# Patient Record
Sex: MALE | Race: WHITE | NOT HISPANIC OR LATINO | Employment: FULL TIME | URBAN - METROPOLITAN AREA
[De-identification: names, ages, dates, MRNs, and addresses within clinical notes are randomized per-mention and may not be internally consistent; named-entity substitution may affect disease eponyms.]

---

## 2020-12-04 ENCOUNTER — OFFICE VISIT (OUTPATIENT)
Dept: URGENT CARE | Facility: CLINIC | Age: 30
End: 2020-12-04
Payer: COMMERCIAL

## 2020-12-04 VITALS
TEMPERATURE: 98 F | OXYGEN SATURATION: 97 % | BODY MASS INDEX: 35.27 KG/M2 | WEIGHT: 238.1 LBS | DIASTOLIC BLOOD PRESSURE: 78 MMHG | HEART RATE: 86 BPM | RESPIRATION RATE: 16 BRPM | HEIGHT: 69 IN | SYSTOLIC BLOOD PRESSURE: 142 MMHG

## 2020-12-04 DIAGNOSIS — S96.912A STRAIN OF LEFT ANKLE, INITIAL ENCOUNTER: ICD-10-CM

## 2020-12-04 PROCEDURE — 99213 OFFICE O/P EST LOW 20 MIN: CPT | Performed by: PHYSICIAN ASSISTANT

## 2020-12-04 RX ORDER — MELOXICAM 7.5 MG/1
7.5 TABLET ORAL DAILY
Qty: 30 TAB | Refills: 0 | Status: SHIPPED
Start: 2020-12-04 | End: 2020-12-04 | Stop reason: CLARIF

## 2020-12-04 RX ORDER — FLUTICASONE PROPIONATE AND SALMETEROL XINAFOATE 230; 21 UG/1; UG/1
AEROSOL, METERED RESPIRATORY (INHALATION)
COMMUNITY
Start: 2020-06-15 | End: 2021-03-16 | Stop reason: SDUPTHER

## 2020-12-04 ASSESSMENT — ENCOUNTER SYMPTOMS
SHORTNESS OF BREATH: 0
FEVER: 0
FOCAL WEAKNESS: 0
TINGLING: 0
CHILLS: 0
NAUSEA: 0
VOMITING: 0
SENSORY CHANGE: 0

## 2020-12-04 NOTE — PROGRESS NOTES
"Subjective:   Ghassan Araujo  is a 30 y.o. male who presents for Ankle Injury (Pt states he may have rolled his LT ankle, now feeling pain that radiates to the outter portion of LT leg.)      Ankle Injury   Pertinent negatives include no tingling.   Patient reports having rolled his ankle twice over the last 2 days.  He notes initially while doing a [place] he felt some pain to the front of left ankle worsened yesterday after forced dorsiflexion while stepping up onto a step.  Patient denies past medical history of surgery or significant injury to left ankle.  Denies numbness tingling or weakness.  Notes mild localized swelling and tenderness to palpation as well as pain with weightbearing to anterior lateral left ankle.  Denies crepitus.  Denies treatments tried.  Patient does avoid anti-inflammatories secondary to past medical history of Crohn's.  Comes to clinic requesting work note.  Patient informed we do not have x-ray prior to checking in.    Review of Systems   Constitutional: Negative for chills and fever.   Respiratory: Negative for shortness of breath.    Gastrointestinal: Negative for nausea and vomiting.   Musculoskeletal: Positive for joint pain ( left ankle).   Skin: Negative for rash.   Neurological: Negative for tingling, sensory change and focal weakness.       Allergies   Allergen Reactions   • Penicillins Anaphylaxis   • Methotrexate Rash        Objective:   /78   Pulse 86   Temp 36.7 °C (98 °F) (Temporal)   Resp 16   Ht 1.753 m (5' 9\")   Wt 108 kg (238 lb 1.6 oz)   SpO2 97%   BMI 35.16 kg/m²     Physical Exam  Vitals signs and nursing note reviewed.   Constitutional:       General: He is not in acute distress.     Appearance: He is well-developed. He is not diaphoretic.   HENT:      Head: Normocephalic and atraumatic.      Right Ear: External ear normal.      Left Ear: External ear normal.      Nose: Nose normal.   Eyes:      General: No scleral icterus.        Right eye: " No discharge.         Left eye: No discharge.      Conjunctiva/sclera: Conjunctivae normal.   Neck:      Musculoskeletal: Neck supple.   Pulmonary:      Effort: Pulmonary effort is normal. No respiratory distress.   Musculoskeletal: Normal range of motion.      Left ankle: He exhibits swelling. He exhibits normal range of motion, no ecchymosis, no deformity, no laceration and normal pulse. Tenderness. Lateral malleolus and AITFL tenderness found. No medial malleolus, no CF ligament, no posterior TFL, no head of 5th metatarsal and no proximal fibula tenderness found. Achilles tendon normal.   Skin:     General: Skin is warm and dry.      Coloration: Skin is not pale.   Neurological:      Mental Status: He is alert and oriented to person, place, and time.      Coordination: Coordination normal.         Assessment/Plan:   1. Strain of left ankle, initial encounter  - REFERRAL TO SPORTS MEDICINE    Other orders  - PROBIOTIC PRODUCT PO; Take  by mouth every day.  - fluticasone-salmeterol (ADVAIR HFA) 230-21 MCG/ACT inhaler; INHALE 2 PUFFS BY MOUTH TWICE A DAY  - ALBUTEROL INH; Inhale.  Recommend conservative care, rest, ice, elevation, work on gentle ROM exercises  Sent with [employer] and [employer], referral for sports med follow-up with persistence, recommendation for OTC Tylenol, ice, elevation, active range of motion    I have worn an N95 mask, gloves and eye protection for the entire encounter with this patient.     Differential diagnosis, natural history, supportive care, and indications for immediate follow-up discussed.

## 2020-12-04 NOTE — LETTER
December 4, 2020       Patient: Ghassan Araujo   YOB: 1990   Date of Visit: 12/4/2020         To Whom It May Concern:    In my medical opinion, I recommend that Ghassan Araujo should be excused from work for today, tomorrow and permitted to return to next scheduled shift thereafter.     This is non-Covid related.    If you have any questions or concerns, please don't hesitate to call [phone]          Sincerely,          Immanuel Alicia P.A.-C.  Electronically Signed

## 2020-12-07 ENCOUNTER — SUPERVISING PHYSICIAN REVIEW (OUTPATIENT)
Dept: URGENT CARE | Facility: CLINIC | Age: 30
End: 2020-12-07

## 2020-12-08 NOTE — PROGRESS NOTES
I have reviewed and agree with history, assessment and plan for office encounter on 12/4/2020 with Advanced Practice Provider: Gigi LAY.  Face to face encounter/direct observation: No  Suggested changes to plan or follow-up: none   Myke Ramos M.D.

## 2021-01-21 ENCOUNTER — TELEPHONE (OUTPATIENT)
Dept: SCHEDULING | Facility: IMAGING CENTER | Age: 31
End: 2021-01-21

## 2021-03-16 ENCOUNTER — TELEMEDICINE (OUTPATIENT)
Dept: MEDICAL GROUP | Facility: PHYSICIAN GROUP | Age: 31
End: 2021-03-16
Payer: COMMERCIAL

## 2021-03-16 ENCOUNTER — APPOINTMENT (OUTPATIENT)
Dept: MEDICAL GROUP | Facility: PHYSICIAN GROUP | Age: 31
End: 2021-03-16
Payer: COMMERCIAL

## 2021-03-16 VITALS — WEIGHT: 230 LBS | BODY MASS INDEX: 32.93 KG/M2 | HEIGHT: 70 IN

## 2021-03-16 DIAGNOSIS — Z13.29 SCREENING FOR ENDOCRINE DISORDER: ICD-10-CM

## 2021-03-16 DIAGNOSIS — F98.8 ATTENTION DEFICIT DISORDER, UNSPECIFIED HYPERACTIVITY PRESENCE: ICD-10-CM

## 2021-03-16 DIAGNOSIS — Z13.0 SCREENING FOR DEFICIENCY ANEMIA: ICD-10-CM

## 2021-03-16 DIAGNOSIS — J45.20 MILD INTERMITTENT ASTHMA WITHOUT COMPLICATION: ICD-10-CM

## 2021-03-16 PROCEDURE — 99204 OFFICE O/P NEW MOD 45 MIN: CPT | Performed by: INTERNAL MEDICINE

## 2021-03-16 RX ORDER — FLUTICASONE PROPIONATE AND SALMETEROL XINAFOATE 230; 21 UG/1; UG/1
2 AEROSOL, METERED RESPIRATORY (INHALATION) DAILY
Qty: 1 EACH | Refills: 6 | Status: SHIPPED | OUTPATIENT
Start: 2021-03-16 | End: 2021-05-24 | Stop reason: SDUPTHER

## 2021-03-16 RX ORDER — DEXTROAMPHETAMINE SACCHARATE, AMPHETAMINE ASPARTATE MONOHYDRATE, DEXTROAMPHETAMINE SULFATE AND AMPHETAMINE SULFATE 5; 5; 5; 5 MG/1; MG/1; MG/1; MG/1
20 CAPSULE, EXTENDED RELEASE ORAL EVERY MORNING
Qty: 30 CAPSULE | Refills: 0 | Status: SHIPPED | OUTPATIENT
Start: 2021-03-16 | End: 2021-04-22 | Stop reason: SDUPTHER

## 2021-03-16 RX ORDER — ALBUTEROL SULFATE 90 UG/1
2 AEROSOL, METERED RESPIRATORY (INHALATION) EVERY 4 HOURS PRN
Qty: 1 EACH | Refills: 5 | Status: SHIPPED | OUTPATIENT
Start: 2021-03-16 | End: 2021-11-23 | Stop reason: SDUPTHER

## 2021-03-16 ASSESSMENT — PATIENT HEALTH QUESTIONNAIRE - PHQ9: CLINICAL INTERPRETATION OF PHQ2 SCORE: 0

## 2021-03-16 NOTE — PROGRESS NOTES
This visit was conducted via  [employer] Video Virtual Visit using secure and encrypted videoconferencing technology.   The patient's identity was confirmed and verbal consent was obtained for this virtual visit.  -------------------------------------------------------------------------------    CC: New provider  Refill Adderall Advair and albuterol         HPI: This is a 30 y.o. pt.  Pt's medical history is notable for:      ADD (attention deficit disorder)  This is a chronic and stable condition.  The patient has been taking Adderall extended release daily.  Patient is requesting refills.  No significant side effects reported    Mild intermittent asthma  Chronic and stable condition.  The patient currently using Advair daily and albuterol as needed.  He also requests refills.  Patient denies fever chills shortness of breath or wheezing at the present time.              REVIEW OF SYSTEMS:     Constitutional:  no fever / chills   Neurologic: no headache  ENT: no sore throat, no hearing loss  CV:  no chest pain, no palpitations  Pulmonary: no SOB, no cough          Allergies: Penicillins and Methotrexate    Current Outpatient Medications Ordered in Epic   Medication Sig Dispense Refill   • amphetamine-dextroamphetamine (ADDERALL XR, 20MG,) 20 MG per XR capsule Take 1 capsule by mouth every morning for 30 days. 30 capsule 0   • fluticasone-salmeterol (ADVAIR HFA) 230-21 MCG/ACT inhaler Inhale 2 Puffs every day. 1 Each 6   • albuterol 108 (90 Base) MCG/ACT Aero Soln inhalation aerosol Inhale 2 Puffs every four hours as needed for Shortness of Breath. 1 Each 5   • PROBIOTIC PRODUCT PO Take  by mouth every day.     • ALBUTEROL INH Inhale.       No current [facility]-ordered facility-administered medications on file.       Past Medical, Social, and Family history reviewed and updated in EPIC    ------------------------------------------------------------------      PHYSICAL EXAM:   Psych:  A&O x 3, mood and affect  appropriate  Constitutional: no distress  Skin: No apparent rashes  ENMT: Lips without lesions. Phonation normal.  Neck: No obvious masses visible   Respiratory: Unlabored respiratory effort      ---------------------------------------------------------------------    ASSESSMENT:   1. Mild intermittent asthma without complication     2. Attention deficit disorder, unspecified hyperactivity presence  amphetamine-dextroamphetamine (ADDERALL XR, 20MG,) 20 MG per XR capsule   3. Screening for endocrine disorder  HEMOGLOBIN A1C    ALANINE AMINO-TRANS    Basic Metabolic Panel    Lipid Profile    TSH    MICROALBUMIN CREAT RATIO URINE    VITAMIN D,25 HYDROXY   4. Screening for deficiency anemia  CBC WITH DIFFERENTIAL          MEDICAL DECISION MAKING: DISCUSSION / STATUS / PLAN:    Patient appears clinically stable  Refilled Advair albuterol and Adderall per patient request  Potential side effects of each medication discussed with the patient  General lab tests ordered today.  Advised the patient to call for the results after few days.        Return in about 6 months (around 9/16/2021).       PATIENT EDUCATION:  -If any problems should arise, patient was advised to contact our office or go to ER to be evaluated.      Please note that this dictation was created using voice recognition software. I have made every reasonable attempt to correct obvious errors, but it is possible there are errors of grammar and possibly content that I did not discover before finalizing the note.

## 2021-03-16 NOTE — ASSESSMENT & PLAN NOTE
This is a chronic and stable condition.  The patient has been taking Adderall extended release daily.  Patient is requesting refills.  No significant side effects reported

## 2021-03-16 NOTE — ASSESSMENT & PLAN NOTE
Chronic and stable condition.  The patient currently using Advair daily and albuterol as needed.  He also requests refills.  Patient denies fever chills shortness of breath or wheezing at the present time.

## 2021-04-16 ENCOUNTER — OFFICE VISIT (OUTPATIENT)
Dept: URGENT CARE | Facility: CLINIC | Age: 31
End: 2021-04-16
Payer: COMMERCIAL

## 2021-04-16 VITALS
HEIGHT: 70 IN | SYSTOLIC BLOOD PRESSURE: 112 MMHG | RESPIRATION RATE: 16 BRPM | OXYGEN SATURATION: 95 % | BODY MASS INDEX: 31.64 KG/M2 | DIASTOLIC BLOOD PRESSURE: 82 MMHG | TEMPERATURE: 98 F | HEART RATE: 76 BPM | WEIGHT: 221 LBS

## 2021-04-16 DIAGNOSIS — K52.9 GASTROENTERITIS: ICD-10-CM

## 2021-04-16 PROCEDURE — 99214 OFFICE O/P EST MOD 30 MIN: CPT | Performed by: PHYSICIAN ASSISTANT

## 2021-04-16 RX ORDER — ONDANSETRON 2 MG/ML
4 INJECTION INTRAMUSCULAR; INTRAVENOUS ONCE
Status: DISCONTINUED | OUTPATIENT
Start: 2021-04-16 | End: 2021-04-16

## 2021-04-16 RX ORDER — ONDANSETRON 4 MG/1
4 TABLET, ORALLY DISINTEGRATING ORAL EVERY 6 HOURS PRN
Qty: 30 TABLET | Refills: 0 | Status: SHIPPED
Start: 2021-04-16 | End: 2021-08-23

## 2021-04-16 RX ORDER — ONDANSETRON 2 MG/ML
4 INJECTION INTRAMUSCULAR; INTRAVENOUS ONCE
Status: COMPLETED | OUTPATIENT
Start: 2021-04-16 | End: 2021-04-16

## 2021-04-16 RX ORDER — ONDANSETRON 4 MG/1
4 TABLET, ORALLY DISINTEGRATING ORAL ONCE
Status: COMPLETED | OUTPATIENT
Start: 2021-04-16 | End: 2021-04-16

## 2021-04-16 RX ADMIN — ONDANSETRON 4 MG: 2 INJECTION INTRAMUSCULAR; INTRAVENOUS at 09:50

## 2021-04-16 RX ADMIN — ONDANSETRON 4 MG: 4 TABLET, ORALLY DISINTEGRATING ORAL at 09:30

## 2021-04-16 ASSESSMENT — ENCOUNTER SYMPTOMS
DIZZINESS: 0
ABDOMINAL PAIN: 0
NUMBER OF EPISODES OF EMESIS TODAY: 1
BLOOD IN STOOL: 0
CONSTIPATION: 0
MUSCULOSKELETAL NEGATIVE: 1
DIARRHEA: 0
NAUSEA: 1
CHILLS: 0
HEADACHES: 0
SHORTNESS OF BREATH: 0
FEVER: 0
VOMITING: 1
CHANGE IN BOWEL HABIT: 0

## 2021-04-16 NOTE — PROGRESS NOTES
"Subjective:      Ghassan Araujo is a 30 y.o. male who presents with Emesis (Nausea/Vomiting, Pt states Sx started within the last few hours. )            Emesis  This is a new problem. The current episode started today. The problem occurs constantly. The problem has been unchanged. Associated symptoms include nausea and vomiting. Pertinent negatives include no abdominal pain, change in bowel habit, chest pain, chills, congestion, fever, headaches or rash. The symptoms are aggravated by drinking and eating. He has tried nothing for the symptoms.       Patient presents to urgent care reporting nausea and vomiting starting approximately 2 hours PTA. No fevers, chills, body aches, abdominal pain, diarrhea, urinary symptoms, or bloody stools. No recent travel, suspect food intake, recent use of antibiotics, or sick contacts. He has no known medical problems and doesn't take any regular medications.     Review of Systems   Constitutional: Negative for chills and fever.   HENT: Negative for congestion.    Respiratory: Negative for shortness of breath.    Cardiovascular: Negative for chest pain.   Gastrointestinal: Positive for nausea and vomiting. Negative for abdominal pain, blood in stool, change in bowel habit, constipation and diarrhea.   Genitourinary: Negative.    Musculoskeletal: Negative.    Skin: Negative for rash.   Neurological: Negative for dizziness and headaches.        Objective:     /82   Pulse 76   Temp 36.7 °C (98 °F) (Temporal)   Resp 16   Ht 1.778 m (5' 10\")   Wt 100 kg (221 lb)   SpO2 95%   BMI 31.71 kg/m²        Physical Exam  Vitals and nursing note reviewed.   Constitutional:       Appearance: Normal appearance. He is well-developed.   HENT:      Head: Normocephalic and atraumatic.      Right Ear: External ear normal.      Left Ear: External ear normal.   Eyes:      Conjunctiva/sclera: Conjunctivae normal.   Cardiovascular:      Rate and Rhythm: Normal rate.   Pulmonary:     "  Effort: Pulmonary effort is normal.   Abdominal:      General: Abdomen is flat. Bowel sounds are normal. There is no distension.      Tenderness: There is no abdominal tenderness. There is no right CVA tenderness, left CVA tenderness or guarding.   Musculoskeletal:         General: Normal range of motion.      Cervical back: Normal range of motion.   Skin:     General: Skin is warm and dry.   Neurological:      Mental Status: He is alert and oriented to person, place, and time.   Psychiatric:         Behavior: Behavior normal.          PMH:  has no past medical history on file.  MEDS:   Current Outpatient Medications:   •  ondansetron (ZOFRAN ODT) 4 MG TABLET DISPERSIBLE, Take 1 tablet by mouth every 6 hours as needed for Nausea., Disp: 30 tablet, Rfl: 0  •  fluticasone-salmeterol (ADVAIR HFA) 230-21 MCG/ACT inhaler, Inhale 2 Puffs every day., Disp: 1 Each, Rfl: 6  •  albuterol 108 (90 Base) MCG/ACT Aero Soln inhalation aerosol, Inhale 2 Puffs every four hours as needed for Shortness of Breath. (Patient not taking: Reported on 4/16/2021), Disp: 1 Each, Rfl: 5  •  PROBIOTIC PRODUCT PO, Take  by mouth every day., Disp: , Rfl:   •  ALBUTEROL INH, Inhale., Disp: , Rfl:     Current Facility-Administered Medications:   •  ondansetron (ZOFRAN ODT) dispertab 4 mg, 4 mg, Oral, Once, Clau Dow, P.A.-C.  •  ondansetron (ZOFRAN) syringe/vial injection 4 mg, 4 mg, Intramuscular, Once, Clau Dow, P.A.-C.  ALLERGIES:   Allergies   Allergen Reactions   • Penicillins Anaphylaxis   • Methotrexate Rash     SURGHX:   Past Surgical History:   Procedure Laterality Date   • HAND SURGERY Right 2003     SOCHX:  reports that he has been smoking. He has never used smokeless tobacco. He reports current alcohol use. He reports current drug use. Drug: Marijuana.  FH: family history includes Cancer in his maternal grandmother and paternal grandfather; Heart Disease in his maternal grandfather and paternal grandmother.        Assessment/Plan:        1. Gastroenteritis    - ondansetron (ZOFRAN ODT) dispertab 4 mg   - patient vomited shortly after taking medication  - ondansetron (ZOFRAN) syringe/vial injection 4 mg   - Given in urgent care with moderate relief of nausea after 15 minutes of monitoring in clinic   - ondansetron (ZOFRAN ODT) 4 MG TABLET DISPERSIBLE; Take 1 tablet by mouth every 6 hours as needed for Nausea.  Dispense: 30 tablet; Refill: 0      Advised patient symptoms are most likely viral in etiology, recommend supportive care. Increased fluids and rest. Zofran as needed for nausea/vomiting. BRAT diet discussed. Monitor closely and seek medical attention if symptoms persist/worsen. Work note provided today. The patient demonstrated a good understanding and agreed with the treatment plan.

## 2021-04-16 NOTE — LETTER
April 16, 2021         Patient: Ghassan Araujo   YOB: 1990   Date of Visit: 4/16/2021           To Whom it May Concern:    Ghassan Araujo was seen in my clinic on 4/16/2021.  Please excuse his absence from 4/16/21-4/17/21.     If you have any questions or concerns, please don't hesitate to call.        Sincerely,           Clau Dow P.A.-C.  Electronically Signed

## 2021-04-22 ENCOUNTER — TELEMEDICINE (OUTPATIENT)
Dept: MEDICAL GROUP | Facility: PHYSICIAN GROUP | Age: 31
End: 2021-04-22
Payer: COMMERCIAL

## 2021-04-22 VITALS — RESPIRATION RATE: 16 BRPM | BODY MASS INDEX: 31.64 KG/M2 | WEIGHT: 221 LBS | HEIGHT: 70 IN

## 2021-04-22 DIAGNOSIS — F98.8 ATTENTION DEFICIT DISORDER, UNSPECIFIED HYPERACTIVITY PRESENCE: ICD-10-CM

## 2021-04-22 DIAGNOSIS — J45.20 MILD INTERMITTENT ASTHMA WITHOUT COMPLICATION: ICD-10-CM

## 2021-04-22 DIAGNOSIS — K50.90 CROHN'S DISEASE WITHOUT COMPLICATION, UNSPECIFIED GASTROINTESTINAL TRACT LOCATION (HCC): ICD-10-CM

## 2021-04-22 PROCEDURE — 99214 OFFICE O/P EST MOD 30 MIN: CPT | Mod: 95,CR | Performed by: INTERNAL MEDICINE

## 2021-04-22 RX ORDER — DEXTROAMPHETAMINE SACCHARATE, AMPHETAMINE ASPARTATE MONOHYDRATE, DEXTROAMPHETAMINE SULFATE AND AMPHETAMINE SULFATE 5; 5; 5; 5 MG/1; MG/1; MG/1; MG/1
20 CAPSULE, EXTENDED RELEASE ORAL EVERY MORNING
Qty: 30 CAPSULE | Refills: 0 | Status: SHIPPED | OUTPATIENT
Start: 2021-04-22 | End: 2021-05-24 | Stop reason: SDUPTHER

## 2021-04-22 NOTE — ASSESSMENT & PLAN NOTE
Per patient report patient was diagnosed with Crohn's disease previously.  Patient is currently not on any therapy.  The patient requests a referral to see GI specialist for follow-up

## 2021-04-22 NOTE — ASSESSMENT & PLAN NOTE
This is a chronic and stable condition.  The patient presently taking Adderall.  He is requesting refills.  No significant side effects reported.

## 2021-04-22 NOTE — ASSESSMENT & PLAN NOTE
Chronic stable condition.  The patient uses Advair daily and albuterol as needed.  No new concerns noted at this time.

## 2021-04-22 NOTE — PROGRESS NOTES
This visit was conducted via  [email or web address] Video Virtual Visit using secure and encrypted videoconferencing technology.   The patient's identity was confirmed and verbal consent was obtained for this virtual visit.  -------------------------------------------------------------------------------    CC: Referral to GI  Refill of Adderall         HPI: This is a 30 y.o. pt.  Pt's medical history is notable for:      ADD (attention deficit disorder)  This is a chronic and stable condition.  The patient presently taking Adderall.  He is requesting refills.  No significant side effects reported.    Mild intermittent asthma  Chronic stable condition.  The patient uses Advair daily and albuterol as needed.  No new concerns noted at this time.    Crohn disease (HCC)  Per patient report patient was diagnosed with Crohn's disease previously.  Patient is currently not on any therapy.  The patient requests a referral to see GI specialist for follow-up              REVIEW OF SYSTEMS:     Constitutional:  no fever / chills   Neurologic: no headache  ENT: no sore throat, no hearing loss  CV:  no chest pain, no palpitations  Pulmonary: no SOB, no cough          Allergies: Penicillins and Methotrexate    Current Outpatient Medications Ordered in Epic   Medication Sig Dispense Refill   • amphetamine-dextroamphetamine (ADDERALL XR, 20MG,) 20 MG per XR capsule Take 1 capsule by mouth every morning for 30 days. 30 capsule 0   • ondansetron (ZOFRAN ODT) 4 MG TABLET DISPERSIBLE Take 1 tablet by mouth every 6 hours as needed for Nausea. 30 tablet 0   • fluticasone-salmeterol (ADVAIR HFA) 230-21 MCG/ACT inhaler Inhale 2 Puffs every day. 1 Each 6   • albuterol 108 (90 Base) MCG/ACT Aero Soln inhalation aerosol Inhale 2 Puffs every four hours as needed for Shortness of Breath. 1 Each 5   • PROBIOTIC PRODUCT PO Take  by mouth every day.     • ALBUTEROL INH Inhale.       No current [facility]-ordered facility-administered medications on file.       Past  Medical, Social, and Family history reviewed and updated in EPIC    ------------------------------------------------------------------    Vitals:    04/22/21 0922   Resp: 16       PHYSICAL EXAM:   Psych:  A&O x 3, mood and affect appropriate  Constitutional: no distress  Skin: No apparent rashes  ENMT: Lips without lesions. Phonation normal.  Neck: No obvious masses visible   Respiratory: Unlabored respiratory effort      ---------------------------------------------------------------------    ASSESSMENT:   1. Attention deficit disorder, unspecified hyperactivity presence  amphetamine-dextroamphetamine (ADDERALL XR, 20MG,) 20 MG per XR capsule   2. Mild intermittent asthma without complication     3. Crohn's disease without complication, unspecified gastrointestinal tract location (HCC)  REFERRAL TO GASTROENTEROLOGY          MEDICAL DECISION MAKING: DISCUSSION / STATUS / PLAN:    Adderall refilled.  No significant side effects reported.  Patient stated that the medication has helped with the ADHD condition significantly.    Asthma.  Chronic condition stable currently patient asymptomatic.    History of Crohn's disease.  Refer the patient to GI specialist.       Return in about 3 months (around 7/22/2021).       PATIENT EDUCATION:  -If any problems should arise, patient was advised to contact our office or go to ER to be evaluated.      Please note that this dictation was created using voice recognition software. I have made every reasonable attempt to correct obvious errors, but it is possible there are errors of grammar and possibly content that I did not discover before finalizing the note.

## 2021-05-24 DIAGNOSIS — F98.8 ATTENTION DEFICIT DISORDER, UNSPECIFIED HYPERACTIVITY PRESENCE: ICD-10-CM

## 2021-05-24 RX ORDER — FLUTICASONE PROPIONATE AND SALMETEROL XINAFOATE 230; 21 UG/1; UG/1
2 AEROSOL, METERED RESPIRATORY (INHALATION) DAILY
Qty: 1 EACH | Refills: 6 | Status: SHIPPED | OUTPATIENT
Start: 2021-05-24 | End: 2021-08-09 | Stop reason: SDUPTHER

## 2021-05-24 RX ORDER — DEXTROAMPHETAMINE SACCHARATE, AMPHETAMINE ASPARTATE MONOHYDRATE, DEXTROAMPHETAMINE SULFATE AND AMPHETAMINE SULFATE 5; 5; 5; 5 MG/1; MG/1; MG/1; MG/1
20 CAPSULE, EXTENDED RELEASE ORAL EVERY MORNING
Qty: 30 CAPSULE | Refills: 0 | Status: SHIPPED | OUTPATIENT
Start: 2021-05-24 | End: 2021-06-28 | Stop reason: SDUPTHER

## 2021-06-28 DIAGNOSIS — F98.8 ATTENTION DEFICIT DISORDER, UNSPECIFIED HYPERACTIVITY PRESENCE: ICD-10-CM

## 2021-06-28 RX ORDER — DEXTROAMPHETAMINE SACCHARATE, AMPHETAMINE ASPARTATE MONOHYDRATE, DEXTROAMPHETAMINE SULFATE AND AMPHETAMINE SULFATE 5; 5; 5; 5 MG/1; MG/1; MG/1; MG/1
20 CAPSULE, EXTENDED RELEASE ORAL EVERY MORNING
Qty: 30 CAPSULE | Refills: 0 | Status: SHIPPED | OUTPATIENT
Start: 2021-06-28 | End: 2021-08-09 | Stop reason: SDUPTHER

## 2021-08-09 DIAGNOSIS — F98.8 ATTENTION DEFICIT DISORDER, UNSPECIFIED HYPERACTIVITY PRESENCE: ICD-10-CM

## 2021-08-09 RX ORDER — FLUTICASONE PROPIONATE AND SALMETEROL XINAFOATE 230; 21 UG/1; UG/1
2 AEROSOL, METERED RESPIRATORY (INHALATION) DAILY
Qty: 1 EACH | Refills: 6 | Status: SHIPPED | OUTPATIENT
Start: 2021-08-09 | End: 2021-12-03 | Stop reason: SDUPTHER

## 2021-08-09 RX ORDER — DEXTROAMPHETAMINE SACCHARATE, AMPHETAMINE ASPARTATE MONOHYDRATE, DEXTROAMPHETAMINE SULFATE AND AMPHETAMINE SULFATE 5; 5; 5; 5 MG/1; MG/1; MG/1; MG/1
20 CAPSULE, EXTENDED RELEASE ORAL EVERY MORNING
Qty: 30 CAPSULE | Refills: 0 | Status: SHIPPED | OUTPATIENT
Start: 2021-08-09 | End: 2021-09-13 | Stop reason: SDUPTHER

## 2021-08-23 ENCOUNTER — OCCUPATIONAL MEDICINE (OUTPATIENT)
Dept: URGENT CARE | Facility: CLINIC | Age: 31
End: 2021-08-23
Payer: COMMERCIAL

## 2021-08-23 ENCOUNTER — APPOINTMENT (OUTPATIENT)
Dept: RADIOLOGY | Facility: IMAGING CENTER | Age: 31
End: 2021-08-23
Attending: FAMILY MEDICINE
Payer: COMMERCIAL

## 2021-08-23 VITALS
RESPIRATION RATE: 18 BRPM | SYSTOLIC BLOOD PRESSURE: 114 MMHG | OXYGEN SATURATION: 98 % | WEIGHT: 220 LBS | HEART RATE: 88 BPM | BODY MASS INDEX: 31.5 KG/M2 | TEMPERATURE: 98.1 F | DIASTOLIC BLOOD PRESSURE: 70 MMHG | HEIGHT: 70 IN

## 2021-08-23 DIAGNOSIS — S87.01XA CRUSHING INJURY OF RIGHT KNEE, INITIAL ENCOUNTER: ICD-10-CM

## 2021-08-23 PROBLEM — J45.909 ASTHMA: Status: ACTIVE | Noted: 2018-02-27

## 2021-08-23 PROBLEM — K51.90 ULCERATIVE COLITIS WITHOUT COMPLICATIONS (HCC): Status: ACTIVE | Noted: 2018-02-27

## 2021-08-23 PROBLEM — I47.10 PSVT (PAROXYSMAL SUPRAVENTRICULAR TACHYCARDIA) (HCC): Status: ACTIVE | Noted: 2018-02-27

## 2021-08-23 PROBLEM — F90.9 ADHD: Status: ACTIVE | Noted: 2018-02-27

## 2021-08-23 PROCEDURE — 73564 X-RAY EXAM KNEE 4 OR MORE: CPT | Mod: TC,RT | Performed by: FAMILY MEDICINE

## 2021-08-23 PROCEDURE — 99203 OFFICE O/P NEW LOW 30 MIN: CPT | Performed by: FAMILY MEDICINE

## 2021-08-23 ASSESSMENT — ENCOUNTER SYMPTOMS
FOCAL WEAKNESS: 0
TINGLING: 0
SENSORY CHANGE: 0

## 2021-08-23 NOTE — LETTER
"EMPLOYEE’S CLAIM FOR COMPENSATION/ REPORT OF INITIAL TREATMENT  FORM C-4    EMPLOYEE’S CLAIM - PROVIDE ALL INFORMATION REQUESTED   First Name  Ghassan Last Name  Van Birthdate                    1990                Sex  male Claim Number (Insurer’s Use Only)    Home Address  [address] Age  30 y.o. 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 (if applicable)  Changing a weld gun arm    How did this injury or occupational disease occur? (Be specific an answer in detail. Use additional sheet if necessary)  Slipped off  cable tray(about 8\" tall) and knee'd a robot   If you believe that you have an occupational disease, when did you first have knowledge of the disability and it relationship to your employment?  na Witnesses to the Accident  MARIA INES ZARAGOZA      Nature of Injury or Occupational Disease  Workers' Compensation  Part(s) of Body Injured or Affected  Knee (R), N/A, N/A    I certify that the above is true and correct to the best of my knowledge and that I have provided this " information in order to obtain the benefits of [place]).  I hereby authorize any physician, chiropractor, surgeon, practitioner, or other person, any hospital, including [facility] or [facility], any medical service organization, any insurance company, or other institution or organization to release to each other, any medical or other information, including benefits paid or payable, pertinent to this injury or disease, except information relative to diagnosis, treatment and/or counseling for AIDS, psychological conditions, alcohol or controlled substances, for which I must give specific authorization.  A Photostat of this authorization shall be as valid as the original.     Date   Place Employee’s Original or  *Electronic Signature   THIS REPORT MUST BE COMPLETED AND MAILED WITHIN 3 WORKING DAYS OF TREATMENT   Place  [facility]  [place]  [facility] [place]   Date  8/23/2021 Diagnosis and Description of Injury or Occupational Disease  (S87.01XA) Crushing injury of right knee, initial encounter Is there evidence the injured employee was under the influence of alcohol and/or another controlled substance at the time of accident?  ? No ? Yes (if yes, please explain)    Hour  2:42 PM   The encounter diagnosis was Crushing injury of right knee, initial encounter. No   Treatment  Knee brace, ice  Have you advised the patient to remain off work five days or     more?    X-Ray Findings  Negative  Comments:X-ray right knee: No fracture   ? Yes Indicate dates:   From   To      From information given by the employee, together with medical evidence, can        you directly connect this injury or occupational disease as job incurred?  Yes ? No If no, is the injured employee capable of:  ? full duty  No ? modified duty  Yes   Is additional medical care by a physician indicated?  Yes If  "Modified Duty, Specify any Limitations / Restrictions  No squatting, no kneeling, no prolonged standing or walking   Do you know of any previous injury or disease contributing to this condition or occupational disease?  ? Yes ? No (Explain if yes)                          No   Date  8/23/2021 Print Health Care Provider's   Myke Ramos M.D. I certify the employer’s copy of  this form was mailed on:   Address  [address], [address] Insurer’s Use Only     [place] Zip  [address]    Provider’s Tax ID Number  [id number] Telephone  Dept: [phone]             Health Care Provider’s Original or Electronic Signature  e-MYKE Castro M.D. Degree (MD,DO, DC,PA-C,APRN)         * Complete and attach Release of Information (Form C-4A) when injured employee signs C-4 Form electronically  ORIGINAL - TREATING HEALTHCARE PROVIDER PAGE 2 - INSURER/TPA PAGE 3 - EMPLOYER PAGE 4 - EMPLOYEE             Form C-4 (rev.08/21)           BRIEF DESCRIPTION OF RIGHTS AND BENEFITS  (Pursuant to [place]RS 616C.050)    Notice of Injury or Occupational Disease (Incident Report Form C-1): If an injury or occupational disease (OD) arises out of and in the course of employment, you must provide written notice to your employer as soon as practicable, but no later than 7 days after the accident or OD. Your employer shall maintain a sufficient supply of the required forms.    Claim for Compensation (Form C-4): If medical treatment is sought, the form C-4 is available at the place of initial treatment. A completed \"Claim for Compensation\" (Form C-4) must be filed within 90 days after an accident or OD. The treating physician or chiropractor must, within 3 working days after treatment, complete and mail to the employer, the employer's insurer and third-party , the Claim for Compensation.    Medical Treatment: If you require medical treatment for your on-the-job injury or OD, you may be required to select a physician " or chiropractor from a list provided by your workers’ compensation insurer, if it has contracted with an Organization for Managed Care (MCO) or Preferred Provider Organization (PPO) or providers of health care. If your employer has not entered into a contract with an MCO or PPO, you may select a physician or chiropractor from the Panel of Physicians and Chiropractors. Any medical costs related to your industrial injury or OD will be paid by your insurer.    Temporary Total Disability (TTD): If your doctor has certified that you are unable to work for a period of at least 5 consecutive days, or 5 cumulative days in a 20-day period, or places restrictions on you that your employer does not accommodate, you may be entitled to TTD compensation.    Temporary Partial Disability (TPD): If the wage you receive upon reemployment is less than the compensation for TTD to which you are entitled, the insurer may be required to pay you TPD compensation to make up the difference. TPD can only be paid for a maximum of 24 months.    Permanent Partial Disability (PPD): When your medical condition is stable and there is an indication of a PPD as a result of your injury or OD, within 30 days, your insurer must arrange for an evaluation by a rating physician or chiropractor to determine the degree of your PPD. The amount of your PPD award depends on the date of injury, the results of the PPD evaluation, your age and wage.    Permanent Total Disability (PTD): If you are medically certified by a treating physician or chiropractor as permanently and totally disabled and have been granted a PTD status by your insurer, you are entitled to receive monthly benefits not to exceed 66 2/3% of your average monthly wage. The amount of your PTD payments is subject to reduction if you previously received a lump-sum PPD award.    Vocational Rehabilitation Services: You may be eligible for vocational rehabilitation services if you are unable to return  to the job due to a permanent physical impairment or permanent restrictions as a result of your injury or occupational disease.    Transportation and Per Doug Reimbursement: You may be eligible for travel expenses and per doug associated with medical treatment.    Reopening: You may be able to reopen your claim if your condition worsens after claim closure.     Appeal Process: If you disagree with a written determination issued by the insurer or the insurer does not respond to your request, you may appeal to the Department of Administration, , by following the instructions contained in your determination letter. You must appeal the determination within 70 days from the date of the determination letter at [address], [address], [place], [place] [address], or [address], [address], [place] [address]. If you disagree with the  decision, you may appeal to the Department of Administration, . You must file your appeal within 30 days from the date of the  decision letter at [address], [address], [place], [place] [address], or [address], [facility] [address], [place] [address]. If you disagree with a decision of an , you may file a petition for judicial review with the District Court. You must do so within 30 days of the Appeal Officer’s decision. You may be represented by an  at your own expense or you may contact the [facility] for possible representation.    [place]  for Injured Workers (NAIW): If you disagree with a  decision, you may request that NAIW represent you without charge at an  Hearing. For information regarding denial of benefits, you may contact the [facility] at: [address], [address], [place] [address], [phone], or [address], [address], [place] [address], [phone]    To File a Complaint with the Division: If you  wish to file a complaint with the  of the Division of Industrial Relations (DIR),  please contact the Workers’ Compensation Section, [address], [address], [place], [place] [address], telephone [phone], or [address], [address], [place], [place] [address], telephone [phone].    For assistance with Workers’ Compensation Issues: You may contact the [facility] Office for Consumer Health Assistance, [address], [address], [place], [place] [address], Toll Free 1-399.642.3865, Web site: http://Critical access hospital.nv.gov/Programs/YANI E-mail: [email or web address]              __________________________________________________________________                                    _________________            Employee Name / Signature                                                                                                                            Date                                                                                                                                                                                                                              D-2 (rev. 10/20)

## 2021-08-23 NOTE — PROGRESS NOTES
Subjective:   Ghassan Araujo is a 30 y.o. male who presents for Knee Pain (RT knee pain x 3 days , )    Date of injury 8/20/2021.  30-year-old  presents with chief complaint of right knee pain, sustained on 8/20/2021 when the employee slipped off a cable tray right knee struck a metal edge of a robot and experienced immediate right anterior knee pain.  The mechanism occurred at the end of the shift and the employee experienced pain with direct pressure, ambulation, and while driving home.  Employee applied ice and kept the right lower extremity elevated.  Employee continues to experience pain on the anterior aspect of the right knee which is worse with direct pressure, ambulation, and attempts at squatting.   See the C4 and D 39 form    Knee Pain      PMH:  has no past medical history on file.  MEDS:   Current Outpatient Medications:   •  amphetamine-dextroamphetamine (ADDERALL XR, 20MG,) 20 MG per XR capsule, Take 1 capsule by mouth every morning for 30 days., Disp: 30 capsule, Rfl: 0  •  fluticasone-salmeterol (ADVAIR HFA) 230-21 MCG/ACT inhaler, Inhale 2 Puffs every day., Disp: 1 Each, Rfl: 6  •  albuterol 108 (90 Base) MCG/ACT Aero Soln inhalation aerosol, Inhale 2 Puffs every four hours as needed for Shortness of Breath., Disp: 1 Each, Rfl: 5  •  PROBIOTIC PRODUCT PO, Take  by mouth every day., Disp: , Rfl:   ALLERGIES:   Allergies   Allergen Reactions   • Penicillins Anaphylaxis   • Methotrexate Rash     SURGHX:   Past Surgical History:   Procedure Laterality Date   • HAND SURGERY Right 2003     SOCHX:  reports that he has been smoking. He has never used smokeless tobacco. He reports current alcohol use. He reports current drug use. Drug: Marijuana.  FH:   Family History   Problem Relation Age of Onset   • Cancer Maternal Grandmother         uterine   • Heart Disease Maternal Grandfather    • Heart Disease Paternal Grandmother    • Cancer Paternal Grandfather          "Lymphoma     Review of Systems   Neurological: Negative for tingling, sensory change and focal weakness.        Objective:   /70   Pulse 88   Temp 36.7 °C (98.1 °F) (Temporal)   Resp 18   Ht 1.778 m (5' 10\")   Wt 99.8 kg (220 lb)   SpO2 98%   BMI 31.57 kg/m²   Physical Exam  Vitals and nursing note reviewed.   Constitutional:       General: He is not in acute distress.     Appearance: He is well-developed.   HENT:      Head: Normocephalic and atraumatic.      Right Ear: External ear normal.      Left Ear: External ear normal.      Nose: Nose normal.      Mouth/Throat:      Mouth: Mucous membranes are moist.   Eyes:      Conjunctiva/sclera: Conjunctivae normal.   Cardiovascular:      Rate and Rhythm: Normal rate.   Pulmonary:      Effort: Pulmonary effort is normal. No respiratory distress.      Breath sounds: Normal breath sounds.   Abdominal:      General: There is no distension.   Musculoskeletal:         General: Normal range of motion.   Skin:     General: Skin is warm and dry.   Neurological:      General: No focal deficit present.      Mental Status: He is alert and oriented to person, place, and time. Mental status is at baseline.      Gait: Gait (gait at baseline) normal.   Psychiatric:         Judgment: Judgment normal.       Right knee: Minimal anterior edema, tender to palpation diffusely patella, full range of motion to flexion and extension yet with guarding noted, no laxity throughout, able to perform a quarter squat with guarding noted, minimally antalgic gait       DX-KNEE COMPLETE 4+ RIGHT  Order: [id number]  Status:  Final result   Visible to patient:  No (scheduled for 8/24/2021  1:14 PM) Next appt:  None Dx:  Crushing injury of right knee, initia...   0 Result Notes  Details    Reading Physician Reading Date Result Priority   Salas Hilliard M.D.  [phone] 8/23/2021 Routine   Narrative & Impression     8/23/2021 2:58 PM     HISTORY/REASON FOR EXAM:  Pain/Deformity Following " Trauma     TECHNIQUE/EXAM DESCRIPTION AND NUMBER OF VIEWS:     4 of the right knee     COMPARISON: None.     FINDINGS:     There is normal bony mineralization.  There is no evidence of fracture, dislocation, or osseous lesion.  There is no evidence of soft tissue injury.     IMPRESSION:        1.  No radiographic evidence of acute injury.        Exam Ended: 08/23/21  3:11 PM Last Resulted: 08/23/21  3:12 PM                    Assessment/Plan:   1. Crushing injury of right knee, initial encounter  - DX-KNEE COMPLETE 4+ RIGHT; Future  - Knee Support      See the C4 and D 39 form.  Recommend knee brace, ice, ibuprofen and/or acetaminophen as needed, recommend return to clinic in 2 days and anticipate advancement of work activity      Please note that this dictation was created using voice recognition software. I have worked with consultants from the vendor as well as technical experts from [facility] [employer] to optimize the interface. I have made every reasonable attempt to correct obvious errors, but I expect that there are errors of grammar and possibly content that I did not discover before finalizing the note.

## 2021-08-23 NOTE — LETTER
[facility]  [address], [address]  [place] [address]  Phone:  [phone] - Fax:  [phone]   [facility] Progress Report and Disability Certification  Date of Service: 8/23/2021   No Show:  No  Date / Time of Next Visit: 8/25/2021   Claim Information   Patient Name: Ghassan Araujo  Claim Number:     Employer: [employer]  Date of Injury: 8/20/2021     Insurer / TPA: [employer]  ID / SSN:     Occupation: EQUIPMENT MAITENANCE TECH  Diagnosis: The encounter diagnosis was Crushing injury of right knee, initial encounter.    Medical Information   Related to Industrial Injury? Yes    Subjective Complaints:  Date of injury 8/20/2021.  30-year-old  presents with chief complaint of right knee pain, sustained on 8/20/2021 when the employee slipped off a cable tray right knee struck a metal edge of a robot and experienced immediate right anterior knee pain.  The mechanism occurred at the end of the shift and the employee experienced pain with direct pressure, ambulation, and while driving home.  Employee applied ice and kept the right lower extremity elevated.  Employee continues to experience pain on the anterior aspect of the right knee which is worse with direct pressure, ambulation, and attempts at squatting.   Objective Findings: Right knee: Minimal anterior edema, tender to palpation diffusely patella, full range of motion to flexion and extension yet with guarding noted, no laxity throughout, able to perform a quarter squat with guarding noted, minimally antalgic gait   Pre-Existing Condition(s):     Assessment:   Initial Visit    Status: Additional Care Required  Permanent Disability:No    Plan:   Comments:Knee brace, ice    Diagnostics: X-ray  Comments:X-ray right knee: No fracture    Comments:       Disability Information   Status: Released to Restricted Duty    From:  8/23/2021  Through: 8/25/2021 Restrictions are:  Temporary   Physical Restrictions   Sitting:    Standing:  < or = to 1 hr/day Stooping:    Bending:      Squattin hrs/day Walking:  < or = to 1 hr/day Climbing:    Pushing:      Pulling:    Other:    Reaching Above Shoulder (L):   Reaching Above Shoulder (R):       Reaching Below Shoulder (L):    Reaching Below Shoulder (R):      Not to exceed Weight Limits   Carrying(hrs):   Weight Limit(lb): < or = to 10 pounds Lifting(hrs):   Weight  Limit(lb): < or = to 10 pounds   Comments: No kneeling, no squatting    Repetitive Actions   Hands: i.e. Fine Manipulations from Grasping:     Feet: i.e. Operating Foot Controls:     Driving / Operate Machinery:     Provider Name:   Myke Ramos M.D. Health Care Provider’s Original or Electronic Signature  e-MYKE Castro M.D. Degree (MD,DO, DC,PA-C,APRN)      Clinic Name / Location: [address], [address] [address] Clinic Phone Number: Dept: [phone]   Appointment Time: 2:30 Pm Visit Start Time: 2:42 PM   Check-In Time:  2:39 Pm Visit Discharge Time:     Original-Treating Physician or Chiropractor    Page 2-Insurer/TPA    Page 3-Employer    Page 4-Employee

## 2021-08-25 ENCOUNTER — OCCUPATIONAL MEDICINE (OUTPATIENT)
Dept: URGENT CARE | Facility: CLINIC | Age: 31
End: 2021-08-25
Payer: COMMERCIAL

## 2021-08-25 VITALS
TEMPERATURE: 98.8 F | HEIGHT: 70 IN | DIASTOLIC BLOOD PRESSURE: 72 MMHG | RESPIRATION RATE: 14 BRPM | HEART RATE: 84 BPM | SYSTOLIC BLOOD PRESSURE: 116 MMHG | OXYGEN SATURATION: 99 % | WEIGHT: 220 LBS | BODY MASS INDEX: 31.5 KG/M2

## 2021-08-25 DIAGNOSIS — S87.01XD CRUSHING INJURY OF RIGHT KNEE, SUBSEQUENT ENCOUNTER: ICD-10-CM

## 2021-08-25 DIAGNOSIS — S86.811D STRAIN OF PATELLAR TENDON, RIGHT, SUBSEQUENT ENCOUNTER: ICD-10-CM

## 2021-08-25 PROCEDURE — 99213 OFFICE O/P EST LOW 20 MIN: CPT | Performed by: EMERGENCY MEDICINE

## 2021-08-25 NOTE — PROGRESS NOTES
"Subjective     Ghassan Araujo is a 30 y.o. male who presents with Knee Pain (LT knee pain )      Date of injury: 08/20/21.  Injured at work: Yes; struck right knee.  Previous injury: MCL sprain; resolved.  Location: Right anterior knee. Severity: Improving, Moderate.  Prior treatment:  Knee brace.     Knee Pain        ROS           Objective     /72   Pulse 84   Temp 37.1 °C (98.8 °F) (Temporal)   Resp 14   Ht 1.778 m (5' 10\")   Wt 99.8 kg (220 lb)   SpO2 99%   BMI 31.57 kg/m²      Physical Exam    General: Alert, cooperative, no acute distress.  Musculoskeletal-right knee: No effusion, no significant swelling.  Tenderness over patellar tendon just infrapatellar, tenderness diffusely distal quadriceps muscles.  Normal range of motion with some discomfort on full extension and flexion.  No instability, no crepitus.  Skin: Warm, dry without rash.  Vascular: No lower extremity edema, cap refill intact.  Neurological: Normal gait, normal distal motor function.                   Assessment & Plan        1. Strain of patellar tendon, right, subsequent encounter  D 39 with restrictions.  Ice, OTC analgesia as needed.  Recheck in 1 week.    2. Crushing injury of right knee, subsequent encounter  Knee brace without hinges as needed.                "

## 2021-08-25 NOTE — LETTER
[facility]  [address] - [place] [address]  Phone:  [phone] - Fax:  [phone]   [facility] Progress Report and Disability Certification  Date of Service: 8/25/2021   No Show:  No  Date / Time of Next Visit: 9/1/2021   Claim Information   Patient Name: Ghassan Araujo  Claim Number:     Employer: [employer]  Date of Injury: 8/20/2021     Insurer / TPA: [employer]  ID / SSN:     Occupation: EQUIPMENT MAITENANCE TECH  Diagnosis: Diagnoses of Strain of patellar tendon, right, subsequent encounter and Crushing injury of right knee, subsequent encounter were pertinent to this visit.    Medical Information   Related to Industrial Injury? Yes    Subjective Complaints:  Date of injury: 08/20/21.  Injured at work: Yes; struck right knee.  Previous injury: MCL sprain; resolved.  Location: Right anterior knee. Severity: Improving, Moderate.  Prior treatment:  Knee brace.   Objective Findings: General: Alert, cooperative, no acute distress.  Musculoskeletal-right knee: No effusion, no significant swelling.  Tenderness over patellar tendon just infrapatellar, tenderness diffusely distal quadriceps muscles.  Normal range of motion with some discomfort on full extension and flexion.  No instability, no crepitus.  Skin: Warm, dry without rash.  Vascular: No lower extremity edema, cap refill intact.  Neurological: Normal gait, normal distal motor function.   Pre-Existing Condition(s):     Assessment:   Condition Improved    Status: Additional Care Required  Permanent Disability:No    Plan: Medication    Diagnostics:      Comments:       Disability Information   Status: Released to Restricted Duty    From:  8/25/2021  Through: 9/1/2021 Restrictions are: Temporary   Physical Restrictions   Sitting:    Standing:    Stooping:    Bending:      Squatting:    Walking:    Climbing:  < or = to 2 hrs/day Pushing:      Pulling:    Other:    Comments:No  crouching, kneeling, crawling Reaching Above Shoulder (L):   Reaching Above Shoulder (R):       Reaching Below Shoulder (L):    Reaching Below Shoulder (R):      Not to exceed Weight Limits   Carrying(hrs):   Weight Limit(lb): < or = to 50 pounds Lifting(hrs):   Weight  Limit(lb): < or = to 50 pounds   Comments:      Repetitive Actions   Hands: i.e. Fine Manipulations from Grasping:     Feet: i.e. Operating Foot Controls:     Driving / Operate Machinery:     Provider Name:   Jj Martin M.D. Health Care Provider’s Original or Electronic Signature  e-JJ Escobar M.D. Degree (MD,DO, DC,PA-C,APRN)      Clinic Name / Location: [address], [place] [address]  [place] [address] Clinic Phone Number: Dept: [phone]   Appointment Time: 10:00 Am Visit Start Time: 10:10 AM   Check-In Time:  10:00 Am Visit Discharge Time:     Original-Treating Physician or Chiropractor    Page 2-Insurer/TPA    Page 3-Employer    Page 4-Employee

## 2021-09-01 ENCOUNTER — OCCUPATIONAL MEDICINE (OUTPATIENT)
Dept: URGENT CARE | Facility: CLINIC | Age: 31
End: 2021-09-01
Payer: COMMERCIAL

## 2021-09-01 VITALS
TEMPERATURE: 98.7 F | SYSTOLIC BLOOD PRESSURE: 118 MMHG | BODY MASS INDEX: 34.07 KG/M2 | HEART RATE: 76 BPM | OXYGEN SATURATION: 99 % | DIASTOLIC BLOOD PRESSURE: 68 MMHG | HEIGHT: 69 IN | RESPIRATION RATE: 16 BRPM | WEIGHT: 230 LBS

## 2021-09-01 DIAGNOSIS — S80.01XD CONTUSION OF RIGHT KNEE, SUBSEQUENT ENCOUNTER: ICD-10-CM

## 2021-09-01 PROCEDURE — 99213 OFFICE O/P EST LOW 20 MIN: CPT | Performed by: PHYSICIAN ASSISTANT

## 2021-09-01 NOTE — PROGRESS NOTES
"Subjective     Ghassan Araujo is a 30 y.o. male who presents with Knee Pain (RT knee pain )      DOI: 8/20/2021.  Right knee injury.  Much improved today.  Still having mild point tenderness to the patella.  He has full range of motion.  He is able to perform a full squat.  Not taking anything for the symptoms.  Tolerating full duty.  No new or concerning symptoms.     Knee Pain        ROS           Objective     /68   Pulse 76   Temp 37.1 °C (98.7 °F) (Temporal)   Resp 16   Ht 1.753 m (5' 9\")   Wt 104 kg (230 lb)   SpO2 99%   BMI 33.97 kg/m²      Physical Exam    Very mild point tenderness to the patella.  No deformity, bruising, swelling.  Full range of motion.  No crepitus.  Full squat without pain.  Gait normal.                   Assessment & Plan         1. Contusion of right knee, subsequent encounter       Very mild point tenderness.  Full range of motion.  Discharged MMI full duty    Please note that this dictation was created using voice recognition software. I have made every reasonable attempt to correct obvious errors, but I expect that there are errors of grammar and possibly content that I did not discover before finalizing the note.                "

## 2021-09-01 NOTE — LETTER
[facility]  [address] [facility] [address], [address] [place] [address]  Phone:  [phone] - Fax:  [phone]   [facility] Progress Report and Disability Certification  Date of Service: 9/1/2021   No Show:  No  Date / Time of Next Visit:     Claim Information   Patient Name: Ghassan Araujo  Claim Number:     Employer: [employer]  Date of Injury: 8/20/2021     Insurer / TPA: [employer]  ID / SSN:     Occupation: EQUIPMENT MAITENANCE TECH  Diagnosis: The encounter diagnosis was Contusion of right knee, subsequent encounter.    Medical Information   Related to Industrial Injury? Yes    Subjective Complaints:  DOI: 8/20/2021.  Right knee injury.  Much improved today.  Still having mild point tenderness to the patella.  He has full range of motion.  He is able to perform a full squat.  Not taking anything for the symptoms.  Tolerating full duty.  No new or concerning symptoms.   Objective Findings: Very mild point tenderness to the patella.  No deformity, bruising, swelling.  Full range of motion.  No crepitus.  Full squat without pain.  Gait normal.   Pre-Existing Condition(s):     Assessment:   Condition Improved    Status: Discharged /  MMI  Permanent Disability:No    Plan:      Diagnostics:      Comments:       Disability Information   Status: Released to Full Duty    From:     Through:   Restrictions are:     Physical Restrictions   Sitting:    Standing:    Stooping:    Bending:      Squatting:    Walking:    Climbing:    Pushing:      Pulling:    Other:    Reaching Above Shoulder (L):   Reaching Above Shoulder (R):       Reaching Below Shoulder (L):    Reaching Below Shoulder (R):      Not to exceed Weight Limits   Carrying(hrs):   Weight Limit(lb):   Lifting(hrs):   Weight  Limit(lb):     Comments:      Repetitive Actions   Hands: i.e. Fine Manipulations from Grasping:     Feet: i.e. Operating Foot Controls:     Driving / Operate Machinery:     Health Care  Provider’s Original or Electronic Signature  ALMITA WrightAKATI. Health Care Provider’s Original or Electronic Signature    Jj Martin MD       Clinic Name / Location: [address]  [place] [address] Clinic Phone Number: Dept: [phone]   Appointment Time: 12:00 Pm Visit Start Time: 12:09 PM   Check-In Time:  12:03 Pm Visit Discharge Time:  12:39 PM   Original-Treating Physician or Chiropractor    Page 2-Insurer/TPA    Page 3-Employer    Page 4-Employee

## 2021-09-13 DIAGNOSIS — F98.8 ATTENTION DEFICIT DISORDER, UNSPECIFIED HYPERACTIVITY PRESENCE: ICD-10-CM

## 2021-09-13 RX ORDER — DEXTROAMPHETAMINE SACCHARATE, AMPHETAMINE ASPARTATE MONOHYDRATE, DEXTROAMPHETAMINE SULFATE AND AMPHETAMINE SULFATE 5; 5; 5; 5 MG/1; MG/1; MG/1; MG/1
20 CAPSULE, EXTENDED RELEASE ORAL EVERY MORNING
Qty: 30 CAPSULE | Refills: 0 | Status: SHIPPED | OUTPATIENT
Start: 2021-09-13 | End: 2021-10-14 | Stop reason: SDUPTHER

## 2021-10-14 DIAGNOSIS — F98.8 ATTENTION DEFICIT DISORDER, UNSPECIFIED HYPERACTIVITY PRESENCE: ICD-10-CM

## 2021-10-14 RX ORDER — DEXTROAMPHETAMINE SACCHARATE, AMPHETAMINE ASPARTATE MONOHYDRATE, DEXTROAMPHETAMINE SULFATE AND AMPHETAMINE SULFATE 5; 5; 5; 5 MG/1; MG/1; MG/1; MG/1
20 CAPSULE, EXTENDED RELEASE ORAL EVERY MORNING
Qty: 30 CAPSULE | Refills: 0 | Status: SHIPPED | OUTPATIENT
Start: 2021-10-14 | End: 2021-11-23 | Stop reason: SDUPTHER

## 2021-11-02 PROBLEM — F41.9 ANXIETY: Status: ACTIVE | Noted: 2021-11-02

## 2021-11-02 PROBLEM — Z20.822 EXPOSURE TO CONFIRMED CASE OF COVID-19: Status: ACTIVE | Noted: 2021-11-02

## 2021-11-23 DIAGNOSIS — F98.8 ATTENTION DEFICIT DISORDER, UNSPECIFIED HYPERACTIVITY PRESENCE: ICD-10-CM

## 2021-11-23 RX ORDER — ALBUTEROL SULFATE 90 UG/1
2 AEROSOL, METERED RESPIRATORY (INHALATION) EVERY 4 HOURS PRN
Qty: 1 EACH | Refills: 5 | Status: SHIPPED | OUTPATIENT
Start: 2021-11-23 | End: 2022-02-28 | Stop reason: SDUPTHER

## 2021-11-23 RX ORDER — DEXTROAMPHETAMINE SACCHARATE, AMPHETAMINE ASPARTATE MONOHYDRATE, DEXTROAMPHETAMINE SULFATE AND AMPHETAMINE SULFATE 5; 5; 5; 5 MG/1; MG/1; MG/1; MG/1
20 CAPSULE, EXTENDED RELEASE ORAL EVERY MORNING
Qty: 30 CAPSULE | Refills: 0 | Status: SHIPPED | OUTPATIENT
Start: 2021-11-23 | End: 2021-12-28 | Stop reason: SDUPTHER

## 2021-12-03 RX ORDER — FLUTICASONE PROPIONATE AND SALMETEROL XINAFOATE 230; 21 UG/1; UG/1
2 AEROSOL, METERED RESPIRATORY (INHALATION) DAILY
Qty: 1 EACH | Refills: 6 | Status: SHIPPED | OUTPATIENT
Start: 2021-12-03 | End: 2022-05-18 | Stop reason: SDUPTHER

## 2021-12-13 ENCOUNTER — TELEMEDICINE (OUTPATIENT)
Dept: MEDICAL GROUP | Facility: PHYSICIAN GROUP | Age: 31
End: 2021-12-13
Payer: COMMERCIAL

## 2021-12-13 VITALS — BODY MASS INDEX: 34.07 KG/M2 | WEIGHT: 230 LBS | RESPIRATION RATE: 16 BRPM | HEIGHT: 69 IN

## 2021-12-13 DIAGNOSIS — R05.9 COUGH: ICD-10-CM

## 2021-12-13 PROCEDURE — 99213 OFFICE O/P EST LOW 20 MIN: CPT | Mod: 95 | Performed by: INTERNAL MEDICINE

## 2021-12-13 RX ORDER — BENZONATATE 100 MG/1
100 CAPSULE ORAL 3 TIMES DAILY PRN
Qty: 60 CAPSULE | Refills: 0 | Status: SHIPPED | OUTPATIENT
Start: 2021-12-13 | End: 2023-01-16

## 2021-12-13 RX ORDER — ALBUTEROL SULFATE 90 UG/1
1-2 AEROSOL, METERED RESPIRATORY (INHALATION) EVERY 6 HOURS PRN
Qty: 1 EACH | Refills: 6 | Status: SHIPPED
Start: 2021-12-13 | End: 2022-02-28

## 2021-12-14 NOTE — ASSESSMENT & PLAN NOTE
Covid vaccine status: 2nd pfizer April 2021  First day symptoms began:    Current symptoms: mild cough, dry. Denies fever, chills, sore throat.  Pt reported loss of taste and smell  Patient reported that he had a negative Covid test done last Wednesday at a [employer] drive-through testing site        Issues tolerating foods and fluids today? No        Presence of dyspnea [sob] [] Yes  [x]No       Oxygen saturation [WITHOUT oxygen] at rest: Has not checked         Chest pain/discomfort  []Yes  []No    Dizziness   []Yes  [x]No        Confusion   []Yes  [x]No    Lethargy   []Yes  [x]No    Fever / chills   []Yes  [x]No

## 2021-12-14 NOTE — PROGRESS NOTES
This visit was conducted via  [employer] Video Virtual Visit using secure and encrypted videoconferencing technology.   The patient's identity was confirmed and verbal consent was obtained for this virtual visit.  -------------------------------------------------------------------------------    Chief Complaint   Patient presents with   • Follow-Up      Cough     HPI: This is a 31 y.o. pt.  Pt's medical history is notable for:      Cough  Covid vaccine status: 2nd pfizer vaccine April 2021  First day symptoms began: Approximately 7 to 8 days ago    Current symptoms: mild cough, dry. Denies fever, chills, or sore throat.  Pt reported loss of taste and smell  Patient reported that he had a negative Covid test done last Wednesday at a [employer] drive-through testing site  Denies exposure to anyone with Covid recently      Issues tolerating foods and fluids today? No    Presence of dyspnea [sob] [] Yes  [x]No       Oxygen saturation [WITHOUT oxygen] at rest: Has not checked       Chest pain/discomfort  []Yes  [x]No    Dizziness   []Yes  [x]No        Confusion   []Yes  [x]No    Lethargy   []Yes  [x]No    Fever / chills   []Yes  [x]No                  REVIEW OF SYSTEMS:   As per HPI above. All other systems reviewed and negative.          Allergies: Penicillins and Methotrexate    Current Outpatient Medications Ordered in Epic   Medication Sig Dispense Refill   • benzonatate (TESSALON) 100 MG Cap Take 1 Capsule by mouth 3 times a day as needed for Cough. 60 Capsule 0   • albuterol 108 (90 Base) MCG/ACT Aero Soln inhalation aerosol Inhale 1-2 Puffs every 6 hours as needed for Shortness of Breath. 1 Each 6   • fluticasone-salmeterol (ADVAIR HFA) 230-21 MCG/ACT inhaler Inhale 2 Puffs every day. 1 Each 6   • albuterol 108 (90 Base) MCG/ACT Aero Soln inhalation aerosol Inhale 2 Puffs every four hours as needed for Shortness of Breath. 1 Each 5   • amphetamine-dextroamphetamine (ADDERALL XR, 20MG,) 20 MG per XR capsule Take 1 Capsule  "by mouth every morning for 30 days. 30 Capsule 0   • PROBIOTIC PRODUCT PO Take  by mouth every day.       No current [facility]-ordered facility-administered medications on file.       Past Medical, Social, and Family history reviewed and updated in EPIC    ------------------------------------------------------------------    Vitals:    12/13/21 1554   Resp: 16     Vitals:    12/13/21 1554   Weight: 104 kg (230 lb)   Height: 1.753 m (5' 9\")        PHYSICAL EXAM:   Psych:  A&O x 3, mood and affect appropriate  Constitutional: no distress  Skin: No apparent rashes  ENMT: Lips without lesions. Phonation normal.  Respiratory: Unlabored respiratory effort      ---------------------------------------------------------------------    ASSESSMENT and PLAN:   1. Cough  Rule out possible Covid infection versus viral URI versus bacterial infection  - DX-CHEST-2 VIEWS; Future  Have recommended for the patient to retest for Covid PCR testing. The patient stated that he will schedule an appointment to be done tomorrow at the [facility]  - Advised pt to stay well hydrated and plenty of rest  - Arsenio Med squeeze bottle sinus rinses twice a day as needed  - Warm tea + honey + fresh lemon juice  - Chicken noodle soup  - Throat Coat as needed  - May try otc tylenol 500mg tid prn for headaches, fever or pain [if pt is not allergic to acetaminophen].   May try otc mucinex as needed [expectorant]  I have prescribed Tessalon 100 mg p.o. 3 times daily prn cough. Potential side effects of medication discussed with the patient. Patient may use albuterol as needed for wheezing.    - Return to clinic if not better. Go to urgent care or ER is symptoms worsen /change such as temperature >101, worsening shortness of breath, wheezing, cough, etc...           Rec pt to follow up: lapProximately 7 days.    -If any problems should arise, patient was advised to contact our office for followup or go to ER to be " evaluated.    --------------------------------------------------------------------            Please note that this dictation was created using voice recognition software. I have made every reasonable attempt to correct obvious errors but there may be errors of grammar and content that I may have overlooked prior to finalization of this note.      Tee Gonzales MD  Internal Medicine  [facility]

## 2021-12-28 DIAGNOSIS — F98.8 ATTENTION DEFICIT DISORDER, UNSPECIFIED HYPERACTIVITY PRESENCE: ICD-10-CM

## 2021-12-28 RX ORDER — DEXTROAMPHETAMINE SACCHARATE, AMPHETAMINE ASPARTATE MONOHYDRATE, DEXTROAMPHETAMINE SULFATE AND AMPHETAMINE SULFATE 5; 5; 5; 5 MG/1; MG/1; MG/1; MG/1
20 CAPSULE, EXTENDED RELEASE ORAL EVERY MORNING
Qty: 30 CAPSULE | Refills: 0 | Status: SHIPPED | OUTPATIENT
Start: 2021-12-28 | End: 2022-01-25 | Stop reason: SDUPTHER

## 2022-01-10 DIAGNOSIS — K50.919 CROHN'S DISEASE WITH COMPLICATION, UNSPECIFIED GASTROINTESTINAL TRACT LOCATION (HCC): ICD-10-CM

## 2022-01-13 ENCOUNTER — HOSPITAL ENCOUNTER (OUTPATIENT)
Dept: LAB | Facility: MEDICAL CENTER | Age: 32
End: 2022-01-13
Attending: INTERNAL MEDICINE
Payer: COMMERCIAL

## 2022-01-13 LAB
BASOPHILS # BLD AUTO: 0.4 % (ref 0–1.8)
BASOPHILS # BLD: 0.03 K/UL (ref 0–0.12)
BUN SERPL-MCNC: 9 MG/DL (ref 8–22)
CREAT SERPL-MCNC: 0.8 MG/DL (ref 0.5–1.4)
CRP SERPL HS-MCNC: <0.3 MG/DL (ref 0–0.75)
EOSINOPHIL # BLD AUTO: 0.14 K/UL (ref 0–0.51)
EOSINOPHIL NFR BLD: 1.8 % (ref 0–6.9)
ERYTHROCYTE [DISTWIDTH] IN BLOOD BY AUTOMATED COUNT: 38.6 FL (ref 35.9–50)
HBV SURFACE AG SER QL: NORMAL
HCT VFR BLD AUTO: 47.1 % (ref 42–52)
HCV AB SER QL: NORMAL
HGB BLD-MCNC: 16.5 G/DL (ref 14–18)
IMM GRANULOCYTES # BLD AUTO: 0.03 K/UL (ref 0–0.11)
IMM GRANULOCYTES NFR BLD AUTO: 0.4 % (ref 0–0.9)
LIPASE SERPL-CCNC: 36 U/L (ref 11–82)
LYMPHOCYTES # BLD AUTO: 2.56 K/UL (ref 1–4.8)
LYMPHOCYTES NFR BLD: 32.8 % (ref 22–41)
MCH RBC QN AUTO: 30.7 PG (ref 27–33)
MCHC RBC AUTO-ENTMCNC: 35 G/DL (ref 33.7–35.3)
MCV RBC AUTO: 87.5 FL (ref 81.4–97.8)
MONOCYTES # BLD AUTO: 0.62 K/UL (ref 0–0.85)
MONOCYTES NFR BLD AUTO: 7.9 % (ref 0–13.4)
NEUTROPHILS # BLD AUTO: 4.43 K/UL (ref 1.82–7.42)
NEUTROPHILS NFR BLD: 56.7 % (ref 44–72)
NRBC # BLD AUTO: 0 K/UL
NRBC BLD-RTO: 0 /100 WBC
PLATELET # BLD AUTO: 310 K/UL (ref 164–446)
PMV BLD AUTO: 10.7 FL (ref 9–12.9)
RBC # BLD AUTO: 5.38 M/UL (ref 4.7–6.1)
T4 FREE SERPL-MCNC: 1.18 NG/DL (ref 0.93–1.7)
TSH SERPL DL<=0.005 MIU/L-ACNC: 0.57 UIU/ML (ref 0.38–5.33)
WBC # BLD AUTO: 7.8 K/UL (ref 4.8–10.8)

## 2022-01-13 PROCEDURE — 86140 C-REACTIVE PROTEIN: CPT

## 2022-01-13 PROCEDURE — 36415 COLL VENOUS BLD VENIPUNCTURE: CPT

## 2022-01-13 PROCEDURE — 87340 HEPATITIS B SURFACE AG IA: CPT

## 2022-01-13 PROCEDURE — 83690 ASSAY OF LIPASE: CPT

## 2022-01-13 PROCEDURE — 84439 ASSAY OF FREE THYROXINE: CPT

## 2022-01-13 PROCEDURE — 82565 ASSAY OF CREATININE: CPT

## 2022-01-13 PROCEDURE — 84520 ASSAY OF UREA NITROGEN: CPT

## 2022-01-13 PROCEDURE — 84443 ASSAY THYROID STIM HORMONE: CPT

## 2022-01-13 PROCEDURE — 86480 TB TEST CELL IMMUN MEASURE: CPT

## 2022-01-13 PROCEDURE — 85025 COMPLETE CBC W/AUTO DIFF WBC: CPT

## 2022-01-13 PROCEDURE — 82784 ASSAY IGA/IGD/IGG/IGM EACH: CPT

## 2022-01-13 PROCEDURE — 86364 TISS TRNSGLTMNASE EA IG CLAS: CPT

## 2022-01-13 PROCEDURE — 86803 HEPATITIS C AB TEST: CPT

## 2022-01-14 LAB
GAMMA INTERFERON BACKGROUND BLD IA-ACNC: 0.03 IU/ML
M TB IFN-G BLD-IMP: NEGATIVE
M TB IFN-G CD4+ BCKGRND COR BLD-ACNC: 0.04 IU/ML
MITOGEN IGNF BCKGRD COR BLD-ACNC: >10 IU/ML
QFT TB2 - NIL TBQ2: 0.01 IU/ML

## 2022-01-16 LAB
IGA SERPL-MCNC: 233 MG/DL (ref 68–408)
TTG IGA SER IA-ACNC: <2 U/ML (ref 0–3)

## 2022-01-25 DIAGNOSIS — F98.8 ATTENTION DEFICIT DISORDER, UNSPECIFIED HYPERACTIVITY PRESENCE: ICD-10-CM

## 2022-01-25 RX ORDER — DEXTROAMPHETAMINE SACCHARATE, AMPHETAMINE ASPARTATE MONOHYDRATE, DEXTROAMPHETAMINE SULFATE AND AMPHETAMINE SULFATE 5; 5; 5; 5 MG/1; MG/1; MG/1; MG/1
20 CAPSULE, EXTENDED RELEASE ORAL EVERY MORNING
Qty: 30 CAPSULE | Refills: 0 | Status: SHIPPED | OUTPATIENT
Start: 2022-01-25 | End: 2022-02-24

## 2022-01-25 RX ORDER — DEXTROAMPHETAMINE SACCHARATE, AMPHETAMINE ASPARTATE MONOHYDRATE, DEXTROAMPHETAMINE SULFATE AND AMPHETAMINE SULFATE 5; 5; 5; 5 MG/1; MG/1; MG/1; MG/1
20 CAPSULE, EXTENDED RELEASE ORAL EVERY MORNING
Qty: 30 CAPSULE | Refills: 0 | OUTPATIENT
Start: 2022-01-25 | End: 2022-02-24

## 2022-01-25 RX ORDER — DEXTROAMPHETAMINE SACCHARATE, AMPHETAMINE ASPARTATE MONOHYDRATE, DEXTROAMPHETAMINE SULFATE AND AMPHETAMINE SULFATE 5; 5; 5; 5 MG/1; MG/1; MG/1; MG/1
20 CAPSULE, EXTENDED RELEASE ORAL EVERY MORNING
Qty: 30 CAPSULE | Refills: 0 | Status: SHIPPED | OUTPATIENT
Start: 2022-02-25 | End: 2022-04-18 | Stop reason: SDUPTHER

## 2022-02-14 ENCOUNTER — TELEPHONE (OUTPATIENT)
Dept: MEDICAL GROUP | Facility: PHYSICIAN GROUP | Age: 32
End: 2022-02-14

## 2022-02-14 NOTE — TELEPHONE ENCOUNTER
FINAL PRIOR AUTHORIZATION STATUS:    1.  Name of Medication & Dose: Adderall     2. Prior Auth Status: Approved through 02/14/2023     3. Action Taken: Pharmacy Notified: yes Patient Notified: yes

## 2022-02-14 NOTE — TELEPHONE ENCOUNTER
DOCUMENTATION OF PAR STATUS:    1. Name of Medication & Dose: Adderall 20mg caps     2. Name of Prescription Coverage Company & phone #: [employer]    3. Date Prior Auth Submitted: 2/14/22    4. What information was given to obtain insurance decision? Cover My Meds     5. Prior Auth Status?  Pending    6. Patient Notified: yes

## 2022-02-28 ENCOUNTER — OFFICE VISIT (OUTPATIENT)
Dept: MEDICAL GROUP | Facility: PHYSICIAN GROUP | Age: 32
End: 2022-02-28
Payer: COMMERCIAL

## 2022-02-28 VITALS
BODY MASS INDEX: 33.09 KG/M2 | TEMPERATURE: 99.3 F | SYSTOLIC BLOOD PRESSURE: 114 MMHG | DIASTOLIC BLOOD PRESSURE: 66 MMHG | HEIGHT: 69 IN | OXYGEN SATURATION: 100 % | RESPIRATION RATE: 18 BRPM | HEART RATE: 95 BPM | WEIGHT: 223.4 LBS

## 2022-02-28 DIAGNOSIS — J45.20 MILD INTERMITTENT ASTHMA WITHOUT COMPLICATION: ICD-10-CM

## 2022-02-28 DIAGNOSIS — J40 BRONCHITIS: ICD-10-CM

## 2022-02-28 PROCEDURE — 99213 OFFICE O/P EST LOW 20 MIN: CPT | Performed by: FAMILY MEDICINE

## 2022-02-28 RX ORDER — COVID-19 MOLECULAR TEST ASSAY
KIT MISCELLANEOUS
COMMUNITY
Start: 2022-01-10 | End: 2022-04-04

## 2022-02-28 RX ORDER — ALBUTEROL SULFATE 90 UG/1
2 AEROSOL, METERED RESPIRATORY (INHALATION) EVERY 6 HOURS PRN
Qty: 1 EACH | Refills: 2 | Status: SHIPPED | OUTPATIENT
Start: 2022-02-28

## 2022-02-28 RX ORDER — AZITHROMYCIN 250 MG/1
TABLET, FILM COATED ORAL
Qty: 6 TABLET | Refills: 0 | Status: SHIPPED | OUTPATIENT
Start: 2022-02-28 | End: 2022-03-04

## 2022-02-28 ASSESSMENT — PATIENT HEALTH QUESTIONNAIRE - PHQ9: CLINICAL INTERPRETATION OF PHQ2 SCORE: 0

## 2022-03-01 NOTE — PROGRESS NOTES
Subjective:     CC:   Chief Complaint   Patient presents with   • Follow-Up       HPI:   Ghassan presents today due to the fact that he has been sick since last Wednesday.  Patient stated initially he was running a fever and felt achy.  Then the fever went away over the weekend and now he is coughing up some dark yellow material.  Patient did use his rescue inhaler once today.  Patient is religiously using his Advair inhaler.  Patient did do a home Covid test late last week that was negative.    No past medical history on file.    Social History     Tobacco Use   • Smoking status: Former Smoker     Types: Cigarettes     Quit date: 10/4/2021     Years since quittin.4   • Smokeless tobacco: Never Used   Vaping Use   • Vaping Use: Some days   • Substances: Nicotine   Substance Use Topics   • Alcohol use: Yes     Comment: 6 pack over 1 week    • Drug use: Yes     Types: Marijuana       Current Outpatient Medications Ordered in Epic   Medication Sig Dispense Refill   • albuterol 108 (90 Base) MCG/ACT Aero Soln inhalation aerosol Inhale 2 Puffs every 6 hours as needed for Shortness of Breath. 1 Each 2   • azithromycin (ZITHROMAX) 250 MG Tab 2 tablets today then 1 each day for the next 4 more days 6 Tablet 0   • ID NOW COVID-19 Kit TEST AS DIRECTED TODAY     • amphetamine-dextroamphetamine (ADDERALL XR, 20MG,) 20 MG per XR capsule Take 1 Capsule by mouth every morning for 30 days. 30 Capsule 0   • benzonatate (TESSALON) 100 MG Cap Take 1 Capsule by mouth 3 times a day as needed for Cough. 60 Capsule 0   • fluticasone-salmeterol (ADVAIR HFA) 230-21 MCG/ACT inhaler Inhale 2 Puffs every day. 1 Each 6   • PROBIOTIC PRODUCT PO Take  by mouth every day.       No current [facility]-ordered facility-administered medications on file.       Allergies:  Penicillins and Methotrexate    Health Maintenance: Completed    ROS:  Gen: no fevers/chills  Eyes: no changes in vision  ENT: no sore throat, no hearing loss, no bloody nose  CV: no  "chest pain, no palpitations  GI: no nausea/vomiting, no diarrhea  Neuro: no headaches, no numbness/tingling  Heme/Lymph: no easy bruising    Objective:     Exam:  /66   Pulse 95   Temp 37.4 °C (99.3 °F)   Resp 18   Ht 1.753 m (5' 9\")   Wt 101 kg (223 lb 6.4 oz)   SpO2 100%   BMI 32.99 kg/m²  Body mass index is 32.99 kg/m².    Gen: Alert and oriented, No apparent distress.  Skin: Warm and dry.  No obvious lesions.  Eyes: Sclera wnl Pupils normal in size  ENT: Canals wnl and TM are not red, on oral exam no exudates noted   Lungs: Normal effort, CTA bilaterally, I did hear a few scattered wheezing but breath sounds were heard bilaterally  CV: Regular rate and rhythm. No murmurs, rubs, or gallops.  Musculoskeletal: Normal gait. No extremity cyanosis, clubbing, or edema.  Neuro: Oriented to person, place and time  Psych: Mood is wnl       Assessment & Plan:     31 y.o. male with the following -     1. Mild intermittent asthma without complication  I would recommend patient  use his rescue inhaler at least 4 times a day  .  At this time patient has never had to be on a Medrol Dosepak I recommend if he continues having problems to give me a call and I can always call in 1 for him.  This is a acute problem  2. Bronchitis  We will start him on Zithromax at this point I feel that he does not really need a chest x-ray but if he continues having problems we may have to order 1 this is acute problem    Other orders  - albuterol 108 (90 Base) MCG/ACT Aero Soln inhalation aerosol; Inhale 2 Puffs every 6 hours as needed for Shortness of Breath.  Dispense: 1 Each; Refill: 2  - azithromycin (ZITHROMAX) 250 MG Tab; 2 tablets today then 1 each day for the next 4 more days  Dispense: 6 Tablet; Refill: 0       Return if symptoms worsen or fail to improve.    Please note that this dictation was created using voice recognition software. I have made every reasonable attempt to correct obvious errors, but I expect that there are " errors of grammar and possibly content that I did not discover before finalizing the note.

## 2022-04-04 ENCOUNTER — OFFICE VISIT (OUTPATIENT)
Dept: URGENT CARE | Facility: PHYSICIAN GROUP | Age: 32
End: 2022-04-04
Payer: COMMERCIAL

## 2022-04-04 VITALS
TEMPERATURE: 98 F | HEART RATE: 115 BPM | WEIGHT: 227 LBS | SYSTOLIC BLOOD PRESSURE: 152 MMHG | OXYGEN SATURATION: 97 % | RESPIRATION RATE: 16 BRPM | BODY MASS INDEX: 32.5 KG/M2 | DIASTOLIC BLOOD PRESSURE: 76 MMHG | HEIGHT: 70 IN

## 2022-04-04 DIAGNOSIS — M19.90 INFLAMMATORY ARTHRITIS: ICD-10-CM

## 2022-04-04 PROCEDURE — 99213 OFFICE O/P EST LOW 20 MIN: CPT | Performed by: FAMILY MEDICINE

## 2022-04-04 RX ORDER — PREDNISONE 20 MG/1
40 TABLET ORAL DAILY
Qty: 10 TABLET | Refills: 0 | Status: SHIPPED | OUTPATIENT
Start: 2022-04-04 | End: 2022-04-09 | Stop reason: SDUPTHER

## 2022-04-04 ASSESSMENT — ENCOUNTER SYMPTOMS
EYE DISCHARGE: 0
EYE REDNESS: 0
MYALGIAS: 0
NAUSEA: 0
WEIGHT LOSS: 0
VOMITING: 0

## 2022-04-04 NOTE — LETTER
April 4, 2022         Patient: Ghassan Araujo   YOB: 1990   Date of Visit: 4/4/2022           To Whom it May Concern:    Ghassan Araujo was seen in my clinic on 4/4/2022. Please excuse from work 4/6 and 4/7/2022.      Sincerely,           Micah Arias M.D.  Electronically Signed

## 2022-04-05 NOTE — PROGRESS NOTES
"Subjective     Ghassan Araujo is a 31 y.o. male who presents with Ankle Pain (Gout flare up.x5 days)            5 days right foot and ankle pain.  Redness swelling and warmth.  Symptoms initially first MTP joint and have migrated to be more prominent in dorsum of foot and ankle now.  PMH inflammatory arthritis with suspected pseudogout and symptoms are the same.  He notes that he has responded to direct injection the best in the past.  No fever.  No trauma.  No muscle weakness.  No other aggravating or alleviating factors.      Review of Systems   Constitutional: Negative for malaise/fatigue and weight loss.   Eyes: Negative for discharge and redness.   Gastrointestinal: Negative for nausea and vomiting.   Musculoskeletal: Negative for joint pain and myalgias.   Skin: Negative for itching and rash.              Objective     /76   Pulse (!) 115   Temp 36.7 °C (98 °F) (Temporal)   Resp 16   Ht 1.778 m (5' 10\")   Wt 103 kg (227 lb)   SpO2 97%   BMI 32.57 kg/m²      Physical Exam  Constitutional:       General: He is not in acute distress.     Appearance: He is well-developed.   HENT:      Head: Normocephalic and atraumatic.   Eyes:      Conjunctiva/sclera: Conjunctivae normal.   Musculoskeletal:      Comments: Right lower extremity: Tender swelling redness and warmth first MTP joint, dorsum of foot, and ankle diffusely.  Pain reproduced with movement.  No point bony tenderness or deformity.  No lymphangitis or proximal lymphadenopathy.  No drainage.  Distal neurovascular intact.   Skin:     General: Skin is warm and dry.      Findings: No rash.   Neurological:      Mental Status: He is alert and oriented to person, place, and time.                             Assessment & Plan             1. Inflammatory arthritis  predniSONE (DELTASONE) 20 MG Tab    Referral to Sports Medicine     Differential diagnosis, natural history, supportive care, and indications for immediate follow-up discussed at " length.     Referral placed to sports medicine for consideration of injection if no resolution with systemic corticosteroid.

## 2022-04-08 DIAGNOSIS — M19.90 INFLAMMATORY ARTHRITIS: ICD-10-CM

## 2022-04-08 RX ORDER — PREDNISONE 20 MG/1
40 TABLET ORAL DAILY
Qty: 10 TABLET | Refills: 0 | Status: CANCELLED | OUTPATIENT
Start: 2022-04-08 | End: 2022-04-13

## 2022-04-09 DIAGNOSIS — M19.90 INFLAMMATORY ARTHRITIS: ICD-10-CM

## 2022-04-09 RX ORDER — PREDNISONE 20 MG/1
40 TABLET ORAL DAILY
Qty: 10 TABLET | Refills: 0 | Status: SHIPPED | OUTPATIENT
Start: 2022-04-09 | End: 2022-04-14

## 2022-04-14 ENCOUNTER — OFFICE VISIT (OUTPATIENT)
Dept: SPORTS MEDICINE | Facility: CLINIC | Age: 32
End: 2022-04-14
Payer: COMMERCIAL

## 2022-04-14 ENCOUNTER — APPOINTMENT (OUTPATIENT)
Dept: RADIOLOGY | Facility: IMAGING CENTER | Age: 32
End: 2022-04-14
Attending: FAMILY MEDICINE
Payer: COMMERCIAL

## 2022-04-14 VITALS
SYSTOLIC BLOOD PRESSURE: 132 MMHG | WEIGHT: 227 LBS | TEMPERATURE: 99.1 F | HEART RATE: 88 BPM | DIASTOLIC BLOOD PRESSURE: 88 MMHG | HEIGHT: 70 IN | OXYGEN SATURATION: 97 % | BODY MASS INDEX: 32.5 KG/M2 | RESPIRATION RATE: 16 BRPM

## 2022-04-14 DIAGNOSIS — M79.674 PAIN OF GREAT TOE, RIGHT: ICD-10-CM

## 2022-04-14 DIAGNOSIS — M10.9 PODAGRA: ICD-10-CM

## 2022-04-14 DIAGNOSIS — M25.571 SINUS TARSI SYNDROME OF RIGHT ANKLE: ICD-10-CM

## 2022-04-14 PROCEDURE — 99213 OFFICE O/P EST LOW 20 MIN: CPT | Mod: 25 | Performed by: FAMILY MEDICINE

## 2022-04-14 PROCEDURE — 73660 X-RAY EXAM OF TOE(S): CPT | Mod: TC,RT | Performed by: FAMILY MEDICINE

## 2022-04-14 PROCEDURE — 20604 DRAIN/INJ JOINT/BURSA W/US: CPT | Mod: RT | Performed by: FAMILY MEDICINE

## 2022-04-14 PROCEDURE — 73610 X-RAY EXAM OF ANKLE: CPT | Mod: TC,RT | Performed by: FAMILY MEDICINE

## 2022-04-14 RX ORDER — TRIAMCINOLONE ACETONIDE 40 MG/ML
40 INJECTION, SUSPENSION INTRA-ARTICULAR; INTRAMUSCULAR ONCE
Status: DISCONTINUED | OUTPATIENT
Start: 2022-04-14 | End: 2022-04-14

## 2022-04-14 RX ORDER — TRIAMCINOLONE ACETONIDE 40 MG/ML
20 INJECTION, SUSPENSION INTRA-ARTICULAR; INTRAMUSCULAR ONCE
Status: COMPLETED | OUTPATIENT
Start: 2022-04-14 | End: 2022-04-14

## 2022-04-14 RX ADMIN — TRIAMCINOLONE ACETONIDE 20 MG: 40 INJECTION, SUSPENSION INTRA-ARTICULAR; INTRAMUSCULAR at 15:34

## 2022-04-14 ASSESSMENT — ENCOUNTER SYMPTOMS
FEVER: 0
DIZZINESS: 0
VOMITING: 0
NAUSEA: 0
HEADACHES: 1
CHILLS: 0
SHORTNESS OF BREATH: 0

## 2022-04-14 NOTE — PROGRESS NOTES
"Chief Complaint   Patient presents with   • Digit Pain     Referral from UC/ R big toe pain        Subjective     Referred by Micah Arias M.D.  for evaluation of RIGHT foot ankle pain  Insidious, began as RIGHT podagra  Progressed to the ankle  No injury  Pain is sharp  No radiation  Is with certain movements and ambulation/weightbearing  No alleviating factors  Positive night symptoms  Prednisone helps, but pain comes back when he is off the medications  tesing \"years ago\" and diagnosed with \"pseudogout\" with his initial episode occurring around 2013  He has had prior corticosteroid injection in the RIGHT great MTP joint back in 2013 which HELPED  Has positive prior history of injuries to the RIGHT ankle, mostly sprains back in high school back when he was skateboard     at [employer] [facility] [employer], walking for most of his shift, intermittent lifting up to 80 pounds  Fairly sedentary otherwise    Review of Systems   Constitutional: Negative for chills and fever.   Respiratory: Negative for shortness of breath.    Cardiovascular: Negative for chest pain.   Gastrointestinal: Negative for nausea and vomiting.   Neurological: Positive for headaches. Negative for dizziness.        Intermittent at baseline       PMH:  has no past medical history on file.  MEDS:   Current Outpatient Medications:   •  predniSONE (DELTASONE) 20 MG Tab, Take 2 Tablets by mouth every day for 5 days., Disp: 10 Tablet, Rfl: 0  •  albuterol 108 (90 Base) MCG/ACT Aero Soln inhalation aerosol, Inhale 2 Puffs every 6 hours as needed for Shortness of Breath., Disp: 1 Each, Rfl: 2  •  benzonatate (TESSALON) 100 MG Cap, Take 1 Capsule by mouth 3 times a day as needed for Cough., Disp: 60 Capsule, Rfl: 0  •  fluticasone-salmeterol (ADVAIR HFA) 230-21 MCG/ACT inhaler, Inhale 2 Puffs every day., Disp: 1 Each, Rfl: 6  •  PROBIOTIC PRODUCT PO, Take  by mouth every day., Disp: , Rfl:   ALLERGIES:   Allergies   Allergen Reactions   • Penicillins " "Anaphylaxis   • Methotrexate Rash     SURGHX:   Past Surgical History:   Procedure Laterality Date   • HAND SURGERY Right 2003     SOCHX:  reports that he quit smoking about 6 months ago. His smoking use included cigarettes. He has never used smokeless tobacco. He reports current alcohol use. He reports current drug use. Drug: Marijuana.  FH: Family history was reviewed, no pertinent findings to report    Objective   /88 (BP Location: Left arm, Patient Position: Sitting, BP Cuff Size: Large adult)   Pulse 88   Temp 37.3 °C (99.1 °F) (Temporal)   Resp 16   Ht 1.778 m (5' 10\")   Wt 103 kg (227 lb)   SpO2 97%   BMI 32.57 kg/m²     RIGHT ANKLE:  There is NO swelling noted at the ankle  Range of motion intact with dorsiflexion and plantarflexion, inversion and eversion  There is NO tenderness of the ATFL, CF or PTF ligament  There is NO tenderness of the lateral malleolus or medial malleolus  Anterior drawer testing is NEGATIVE  Talar tilt testing is NEGATIVE  The foot and ankle is otherwise neurovascularly intact    RIGHT FOOT:  There is NO swelling noted at the foot  POSITIVE tenderness at the first MTP joint with pain at extremes  There is NO tenderness at the base of the fifth metatarsal, cuboid, or tarsal navicular  There is NO pain with metatarsal squeeze test    LEFT ANKLE:  There is NO swelling noted at the ankle  Range of motion intact with dorsiflexion and plantarflexion, inversion and eversion  There is NO tenderness of the ATFL, CF or PTF ligament  There is NO tenderness of the lateral malleolus or medial malleolus  Anterior drawer testing is NEGATIVE  Talar tilt testing is NEGATIVE  The foot and ankle is otherwise neurovascularly intact    LEFT FOOT:  There is NO swelling noted at the foot  There is NO tenderness at the base of the fifth metatarsal, cuboid, or tarsal navicular  There is NO pain with metatarsal squeeze test    NEUTRAL stance  Able to ambulate with NORMAL gait    1. Pain of great " toe, right  DX-TOE(S) 2+ RIGHT    triamcinolone acetonide (KENALOG-40) injection 40 mg   2. Podagra  DX-TOE(S) 2+ RIGHT    triamcinolone acetonide (KENALOG-40) injection 40 mg   3. Sinus tarsi syndrome of right ankle  DX-ANKLE 3+ VIEWS RIGHT     Insidious, began as RIGHT podagra  Progressed to the ankle  No injury    POSITIVE MINOR degenerative changes noted at the RIGHT great toe/first MTP joint as well as the RIGHT ankle with enhancement of the medial malleolus NOT mentioned on the official report    RIGHT first MTP joint corticosteroid injection under ultrasound guidance performed the office TODAY (April 14, 2022)    Return in about 4 weeks (around 5/12/2022).   To see how he is doing after his RIGHT first MTP joint corticosteroid injection            4/14/2022 12:37 PM     HISTORY/REASON FOR EXAM:  Atraumatic Pain/Swelling/Deformity; History of podagra with pain at the first MTP joint.  Great toe swelling and redness     TECHNIQUE/EXAM DESCRIPTION AND NUMBER OF VIEWS:  3 views of the RIGHT toes.     COMPARISON: None     FINDINGS:  Bone mineralization is normal. No acute fracture or dislocation. Alignment is anatomic. Joint spaces are grossly preserved. No significant degenerative or erosive arthropathy is seen. There is probably mild soft tissue swelling about the great toe MTP   joint.     IMPRESSION:     Likely mild soft tissue swelling about the first MTP joint. There is no definite bony or joint abnormality seen.             Exam Ended: 04/14/22 12:54 PM Last Resulted: 04/14/22  1:01 PM                 4/14/2022 12:37 PM     HISTORY/REASON FOR EXAM:  Atraumatic Pain/Swelling/Deformity; History of remote ankle sprains with question of pseudogout and sinus Tarsi syndrome.        TECHNIQUE/EXAM DESCRIPTION AND NUMBER OF VIEWS:  3 views of the RIGHT ankle.     COMPARISON: None.     FINDINGS:     There is no fracture or dislocation.  The visualized osseous structures are in anatomic alignment.  Ankle mortise is  symmetric.  The joint spaces are preserved.  Bone mineralization is age-appropriate..  Small Achilles calcaneal spur.     IMPRESSION:     No acute osseous abnormality.                Exam Ended: 04/14/22 12:54 PM Last Resulted: 04/14/22 12:59 PM             Imaging has NOT been performed    Thank you Micah Arias M.D. for allowing me to participate in caring for your patient.

## 2022-04-14 NOTE — Clinical Note
Yordy Obrien, Thanks for referring Ghassan to our sports medicine clinic. We did provide him with a first MTP joint corticosteroid injection under ultrasound guidance at today's visit. Upon reviewing his images, I do think he has some mild degenerative changes of the toe and the ankle likely related to his long history of skateboarding since early childhood. Hope you are well! L

## 2022-04-14 NOTE — PROCEDURES
PROCEDURE NOTE:  right FOOT first MTP joint corticosteroid injection with indirect needle visualization under ultrasound guidance  Risks and benefits discussed  Informed consent obtained  FOOT prepped in sterile fashion utilizing a josef- lateral approach  20 mg of Kenalog and 0.5 cc of 2% lidocaine injected into the RIGHT first MTP joint  Vapocoolant spray was utilized  Patient tolerated the procedure well  Postprocedure care and red flags discussed

## 2022-04-18 DIAGNOSIS — F98.8 ATTENTION DEFICIT DISORDER, UNSPECIFIED HYPERACTIVITY PRESENCE: ICD-10-CM

## 2022-04-18 RX ORDER — DEXTROAMPHETAMINE SACCHARATE, AMPHETAMINE ASPARTATE MONOHYDRATE, DEXTROAMPHETAMINE SULFATE AND AMPHETAMINE SULFATE 5; 5; 5; 5 MG/1; MG/1; MG/1; MG/1
20 CAPSULE, EXTENDED RELEASE ORAL EVERY MORNING
Qty: 30 CAPSULE | Refills: 0 | Status: SHIPPED | OUTPATIENT
Start: 2022-04-18 | End: 2022-05-18 | Stop reason: SDUPTHER

## 2022-05-10 ENCOUNTER — OFFICE VISIT (OUTPATIENT)
Dept: SPORTS MEDICINE | Facility: CLINIC | Age: 32
End: 2022-05-10
Payer: COMMERCIAL

## 2022-05-10 VITALS
BODY MASS INDEX: 32.5 KG/M2 | OXYGEN SATURATION: 95 % | RESPIRATION RATE: 18 BRPM | HEART RATE: 97 BPM | WEIGHT: 227 LBS | DIASTOLIC BLOOD PRESSURE: 78 MMHG | TEMPERATURE: 98.2 F | HEIGHT: 70 IN | SYSTOLIC BLOOD PRESSURE: 124 MMHG

## 2022-05-10 DIAGNOSIS — M25.571 SINUS TARSI SYNDROME OF RIGHT ANKLE: ICD-10-CM

## 2022-05-10 DIAGNOSIS — M79.674 PAIN OF GREAT TOE, RIGHT: ICD-10-CM

## 2022-05-10 DIAGNOSIS — M10.9 PODAGRA: ICD-10-CM

## 2022-05-10 PROCEDURE — 99213 OFFICE O/P EST LOW 20 MIN: CPT | Performed by: FAMILY MEDICINE

## 2022-05-10 NOTE — PROGRESS NOTES
"Chief Complaint   Patient presents with   • Foot Pain     F/V R great toe pain      Subjective     Referred by Micah Arias M.D.  for evaluation of RIGHT foot ankle pain  Insidious, began as RIGHT podagra  Progressed to the ankle  No injury  Pain is sharp  No radiation  Is with certain movements and ambulation/weightbearing  No alleviating factors  Positive night symptoms  Prednisone helps, but pain comes back when he is off the medications  tesing \"years ago\" and diagnosed with \"pseudogout\" with his initial episode occurring around 2013  He has had prior corticosteroid injection in the RIGHT great MTP joint back in 2013 which HELPED  Has positive prior history of injuries to the RIGHT ankle, mostly sprains back in high school back when he was skateboard    Overall, pain is approximately 98% improved     at [facility] [employer] [facility], walking for most of his shift, intermittent lifting up to 80 pounds  Fairly sedentary otherwise    Objective   /78 (BP Location: Left arm, Patient Position: Sitting, BP Cuff Size: Large adult)   Pulse 97   Temp 36.8 °C (98.2 °F) (Temporal)   Resp 18   Ht 1.778 m (5' 10\")   Wt 103 kg (227 lb)   SpO2 95%   BMI 32.57 kg/m²     RIGHT FOOT:  There is NO swelling noted at the foot  NO tenderness at the first MTP joint with pain at extremes  POSITIVE stiffness of BOTH first MTP joints with passive dorsiflexion  There is NO tenderness at the base of the fifth metatarsal, cuboid, or tarsal navicular  There is NO pain with metatarsal squeeze test    NEUTRAL stance  Able to ambulate with NORMAL gait  1. Pain of great toe, right     2. 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       Insidious, began as RIGHT podagra  Progressed to the ankle  No injury    POSITIVE MINOR degenerative changes noted at the RIGHT great toe/first MTP joint as well as the RIGHT ankle with enhancement of the medial malleolus NOT mentioned on the official report    RIGHT first MTP joint " corticosteroid injection under ultrasound guidance performed the office (April 14, 2022) HELPED    He does have some first MTP stiffness BILATERALLY  Encouraged to stretch  He should also wear some stiff soled shoes for work to avoid pain/issues    Follow-up as needed          4/14/2022 12:37 PM     HISTORY/REASON FOR EXAM:  Atraumatic Pain/Swelling/Deformity; History of podagra with pain at the first MTP joint.  Great toe swelling and redness     TECHNIQUE/EXAM DESCRIPTION AND NUMBER OF VIEWS:  3 views of the RIGHT toes.     COMPARISON: None     FINDINGS:  Bone mineralization is normal. No acute fracture or dislocation. Alignment is anatomic. Joint spaces are grossly preserved. No significant degenerative or erosive arthropathy is seen. There is probably mild soft tissue swelling about the great toe MTP   joint.     IMPRESSION:     Likely mild soft tissue swelling about the first MTP joint. There is no definite bony or joint abnormality seen.             Exam Ended: 04/14/22 12:54 PM Last Resulted: 04/14/22  1:01 PM                 4/14/2022 12:37 PM     HISTORY/REASON FOR EXAM:  Atraumatic Pain/Swelling/Deformity; History of remote ankle sprains with question of pseudogout and sinus Tarsi syndrome.        TECHNIQUE/EXAM DESCRIPTION AND NUMBER OF VIEWS:  3 views of the RIGHT ankle.     COMPARISON: None.     FINDINGS:     There is no fracture or dislocation.  The visualized osseous structures are in anatomic alignment.  Ankle mortise is symmetric.  The joint spaces are preserved.  Bone mineralization is age-appropriate..  Small Achilles calcaneal spur.     IMPRESSION:     No acute osseous abnormality.                Exam Ended: 04/14/22 12:54 PM Last Resulted: 04/14/22 12:59 PM           Thank you Micah Arias M.D. for allowing me to participate in caring for your patient.

## 2022-05-14 ENCOUNTER — HOSPITAL ENCOUNTER (EMERGENCY)
Facility: MEDICAL CENTER | Age: 32
End: 2022-05-14
Attending: EMERGENCY MEDICINE
Payer: COMMERCIAL

## 2022-05-14 ENCOUNTER — APPOINTMENT (OUTPATIENT)
Dept: RADIOLOGY | Facility: MEDICAL CENTER | Age: 32
End: 2022-05-14
Attending: EMERGENCY MEDICINE
Payer: COMMERCIAL

## 2022-05-14 VITALS
HEIGHT: 70 IN | BODY MASS INDEX: 32.82 KG/M2 | TEMPERATURE: 97.1 F | HEART RATE: 86 BPM | RESPIRATION RATE: 15 BRPM | SYSTOLIC BLOOD PRESSURE: 133 MMHG | DIASTOLIC BLOOD PRESSURE: 76 MMHG | OXYGEN SATURATION: 95 % | WEIGHT: 229.28 LBS

## 2022-05-14 DIAGNOSIS — Z57.39 OCCUPATIONAL EXPOSURE TO AIR CONTAMINANTS: ICD-10-CM

## 2022-05-14 DIAGNOSIS — R07.89 OTHER CHEST PAIN: ICD-10-CM

## 2022-05-14 LAB
ALBUMIN SERPL BCP-MCNC: 5.1 G/DL (ref 3.2–4.9)
ALBUMIN/GLOB SERPL: 2.3 G/DL
ALP SERPL-CCNC: 92 U/L (ref 30–99)
ALT SERPL-CCNC: 35 U/L (ref 2–50)
ANION GAP SERPL CALC-SCNC: 13 MMOL/L (ref 7–16)
AST SERPL-CCNC: 22 U/L (ref 12–45)
BASOPHILS # BLD AUTO: 0.3 % (ref 0–1.8)
BASOPHILS # BLD: 0.03 K/UL (ref 0–0.12)
BILIRUB SERPL-MCNC: 0.9 MG/DL (ref 0.1–1.5)
BUN SERPL-MCNC: 15 MG/DL (ref 8–22)
CALCIUM SERPL-MCNC: 9.3 MG/DL (ref 8.5–10.5)
CHLORIDE SERPL-SCNC: 99 MMOL/L (ref 96–112)
CO2 SERPL-SCNC: 25 MMOL/L (ref 20–33)
CREAT SERPL-MCNC: 0.88 MG/DL (ref 0.5–1.4)
EKG IMPRESSION: NORMAL
EOSINOPHIL # BLD AUTO: 0.08 K/UL (ref 0–0.51)
EOSINOPHIL NFR BLD: 0.8 % (ref 0–6.9)
ERYTHROCYTE [DISTWIDTH] IN BLOOD BY AUTOMATED COUNT: 36.5 FL (ref 35.9–50)
GFR SERPLBLD CREATININE-BSD FMLA CKD-EPI: 118 ML/MIN/1.73 M 2
GLOBULIN SER CALC-MCNC: 2.2 G/DL (ref 1.9–3.5)
GLUCOSE SERPL-MCNC: 94 MG/DL (ref 65–99)
HCT VFR BLD AUTO: 41 % (ref 42–52)
HGB BLD-MCNC: 14.7 G/DL (ref 14–18)
IMM GRANULOCYTES # BLD AUTO: 0.03 K/UL (ref 0–0.11)
IMM GRANULOCYTES NFR BLD AUTO: 0.3 % (ref 0–0.9)
LYMPHOCYTES # BLD AUTO: 3.38 K/UL (ref 1–4.8)
LYMPHOCYTES NFR BLD: 32.6 % (ref 22–41)
MCH RBC QN AUTO: 30.4 PG (ref 27–33)
MCHC RBC AUTO-ENTMCNC: 35.9 G/DL (ref 33.7–35.3)
MCV RBC AUTO: 84.9 FL (ref 81.4–97.8)
MONOCYTES # BLD AUTO: 0.58 K/UL (ref 0–0.85)
MONOCYTES NFR BLD AUTO: 5.6 % (ref 0–13.4)
NEUTROPHILS # BLD AUTO: 6.28 K/UL (ref 1.82–7.42)
NEUTROPHILS NFR BLD: 60.4 % (ref 44–72)
NRBC # BLD AUTO: 0 K/UL
NRBC BLD-RTO: 0 /100 WBC
PLATELET # BLD AUTO: 312 K/UL (ref 164–446)
PMV BLD AUTO: 9.6 FL (ref 9–12.9)
POTASSIUM SERPL-SCNC: 3.7 MMOL/L (ref 3.6–5.5)
PROT SERPL-MCNC: 7.3 G/DL (ref 6–8.2)
RBC # BLD AUTO: 4.83 M/UL (ref 4.7–6.1)
SODIUM SERPL-SCNC: 137 MMOL/L (ref 135–145)
TROPONIN T SERPL-MCNC: <6 NG/L (ref 6–19)
WBC # BLD AUTO: 10.4 K/UL (ref 4.8–10.8)

## 2022-05-14 PROCEDURE — 84484 ASSAY OF TROPONIN QUANT: CPT

## 2022-05-14 PROCEDURE — 71045 X-RAY EXAM CHEST 1 VIEW: CPT

## 2022-05-14 PROCEDURE — 93005 ELECTROCARDIOGRAM TRACING: CPT

## 2022-05-14 PROCEDURE — 85025 COMPLETE CBC W/AUTO DIFF WBC: CPT

## 2022-05-14 PROCEDURE — 99283 EMERGENCY DEPT VISIT LOW MDM: CPT

## 2022-05-14 PROCEDURE — 36415 COLL VENOUS BLD VENIPUNCTURE: CPT

## 2022-05-14 PROCEDURE — 80053 COMPREHEN METABOLIC PANEL: CPT

## 2022-05-14 PROCEDURE — 93005 ELECTROCARDIOGRAM TRACING: CPT | Performed by: EMERGENCY MEDICINE

## 2022-05-14 ASSESSMENT — ENCOUNTER SYMPTOMS
VOMITING: 0
NAUSEA: 0
HEADACHES: 1
DIZZINESS: 1
SHORTNESS OF BREATH: 0
COUGH: 1

## 2022-05-14 NOTE — LETTER
"  FORM C-4:  EMPLOYEE’S CLAIM FOR COMPENSATION/ REPORT OF INITIAL TREATMENT  EMPLOYEE’S CLAIM - PROVIDE ALL INFORMATION REQUESTED   First Name Ghassan Last Name Van Birthdate 1990  Sex male Claim Number   Home Address [address]             Zip [address]                                   Age  31 y.o. Height  1.778 m (5' 10\") Weight  104 kg (229 lb 4.5 oz) [facility]     Mailing Address [address]              Zip [address] Telephone  [phone] (home)  Primary Language Spoken  English   Insurer   Third Party   [employer] Employee's Occupation (Job Title) When Injury or Occupational Disease Occurred     Employer's Name [employer] Telephone [phone]    Employer Address [address]] Zip [address]   Date of Injury  5/14/2022       Hour of Injury  3:00 PM Date Employer Notified  5/14/2022 Last Day of Work after Injury or Occupational Disease  5/14/2022 Supervisor to Whom Injury Reported  Connor Moe   Address or Location of Accident (if applicable) Work [1]   What were you doing at the time of accident? (if applicable) Fixing weld issue. Putting electrode back on robot    How did this injury or occupational disease occur? Be specific and answer in detail. Use additional sheet if necessary)  A weld (electrode) fell off the robot during weld process. It fell into a pile of weld dust (mostly aluminum). I picked the , up to place it on the robot,but when I did it picked up some weld dust. I didn't notice and in moving my hand toward the robot, threw the dust up in the air near my face at the same time I breathed inward. I then inhaled the dust before I realized what had happened.   If you believe that you have an occupational disease, when did you first have knowledge of the disability and it relationship to your employment? N/A Witnesses to the " Accident  Work cameras, no individuals   Nature of Injury or Occupational Disease  Workers' Compensation Part(s) of Body Injured or Affected  Lungs, N/A, N/A    I CERTIFY THAT THE ABOVE IS TRUE AND CORRECT TO THE BEST OF MY KNOWLEDGE AND THAT I HAVE PROVIDED THIS INFORMATION IN ORDER TO OBTAIN THE BENEFITS OF [place]  I HEREBY AUTHORIZE ANY PHYSICIAN, CHIROPRACTOR, SURGEON, PRACTITIONER, OR OTHER PERSON, ANY HOSPITAL, INCLUDING [facility] OR [facility], ANY MEDICAL SERVICE ORGANIZATION, ANY INSURANCE COMPANY, OR OTHER INSTITUTION OR ORGANIZATION TO RELEASE TO EACH OTHER, ANY MEDICAL OR OTHER INFORMATION, INCLUDING BENEFITS PAID OR PAYABLE, PERTINENT TO THIS INJURY OR DISEASE, EXCEPT INFORMATION RELATIVE TO DIAGNOSIS, TREATMENT AND/OR COUNSELING FOR AIDS, PSYCHOLOGICAL CONDITIONS, ALCOHOL OR CONTROLLED SUBSTANCES, FOR WHICH I MUST GIVE SPECIFIC AUTHORIZATION.  A PHOTOSTAT OF THIS AUTHORIZATION SHALL BE AS VALID AS THE ORIGINAL.  Date   5/14/2022           Place  [facility]            Employee’s Signature   THIS REPORT MUST BE COMPLETED AND MAILED WITHIN 3 WORKING DAYS OF TREATMENT   [facility], EMERGENCY DEPT                       [facility]   Date  5/14/2022 Diagnosis  (R07.89) Other chest pain  (Z57.39) Occupational exposure to air contaminants Is there evidence the injured employee was under the influence of alcohol and/or another controlled substance at the time of accident?   Hour  11:00 PM Description of Injury or Disease  Other chest pain  Occupational exposure to air contaminants No   Treatment  Evaluation for chest pain.  Have you advised the patient to remain off work five days or more?         No   X-Ray Findings  Negative If Yes   From Date    To Date      From information given by the employee, together with medical  "evidence, can you directly connect this injury or occupational disease as job incurred? Yes If No, is employee capable of: Full Duty  Yes Modified Duty      Is additional medical care by a physician indicated? Yes If Modified Duty, Specify any Limitations / Restrictions       Do you know of any previous injury or disease contributing to this condition or occupational disease? No    Date 5/14/2022 Print Doctor’s Name Deric Mora certify the employer’s copy of this form was mailed on:   Address [address] [place] [id number] [phone] INSURER’S USE ONLY   Provider’s Tax ID Number  [id number] Telephone Dept: [phone]    Doctor’s Signature e-DERIC Ellis M.D. Degree  MD      Form C-4 (rev.10/07)                                                                         BRIEF DESCRIPTION OF RIGHTS AND BENEFITS  (Pursuant to [place] 616[place])    Notice of Injury or Occupational Disease (Incident Report Form C-1): If an injury or occupational disease (OD) arises out of and in the course of employment, you must provide written notice to your employer as soon as practicable, but no later than 7 days after the accident or OD. Your employer shall maintain a sufficient supply of the required forms.    Claim for Compensation (Form C-4): If medical treatment is sought, the form C-4 is available at the place of initial treatment. A completed \"Claim for Compensation\" (Form C-4) must be filed within 90 days after an accident or OD. The treating physician or chiropractor must, within 3 working days after treatment, complete and mail to the employer, the employer's insurer and third-party , the Claim for Compensation.    Medical Treatment: If you require medical treatment for your on-the-job injury or OD, you may be required to select a physician or chiropractor from a list provided by your workers’ compensation insurer, if it has contracted with an Organization for Managed Care (MCO) or Preferred " Provider Organization (PPO) or providers of health care. If your employer has not entered into a contract with an MCO or PPO, you may select a physician or chiropractor from the Panel of Physicians and Chiropractors. Any medical costs related to your industrial injury or OD will be paid by your insurer.    Temporary Total Disability (TTD): If your doctor has certified that you are unable to work for a period of at least 5 consecutive days, or 5 cumulative days in a 20-day period, or places restrictions on you that your employer does not accommodate, you may be entitled to TTD compensation.    Temporary Partial Disability (TPD): If the wage you receive upon reemployment is less than the compensation for TTD to which you are entitled, the insurer may be required to pay you TPD compensation to make up the difference. TPD can only be paid for a maximum of 24 months.    Permanent Partial Disability (PPD): When your medical condition is stable and there is an indication of a PPD as a result of your injury or OD, within 30 days, your insurer must arrange for an evaluation by a rating physician or chiropractor to determine the degree of your PPD. The amount of your PPD award depends on the date of injury, the results of the PPD evaluation, your age and wage.    Permanent Total Disability (PTD): If you are medically certified by a treating physician or chiropractor as permanently and totally disabled and have been granted a PTD status by your insurer, you are entitled to receive monthly benefits not to exceed 66 2/3% of your average monthly wage. The amount of your PTD payments is subject to reduction if you previously received a lump-sum PPD award.    Vocational Rehabilitation Services: You may be eligible for vocational rehabilitation services if you are unable to return to the job due to a permanent physical impairment or permanent restrictions as a result of your injury or occupational disease.    Transportation and Per  Doug Reimbursement: You may be eligible for travel expenses and per doug associated with medical treatment.    Reopening: You may be able to reopen your claim if your condition worsens after claim closure.     Appeal Process: If you disagree with a written determination issued by the insurer or the insurer does not respond to your request, you may appeal to the Department of Administration, , by following the instructions contained in your determination letter. You must appeal the determination within 70 days from the date of the determination letter at [address], [address], [place], [place] [address], or [address], [place], [place] [address]. If you disagree with the  decision, you may appeal to the Department of Administration, . You must file your appeal within 30 days from the date of the  decision letter at [address], [address], [place], [place] [address], or [address], [place], [place] [address]. If you disagree with a decision of an , you may file a petition for judicial review with the District Court. You must do so within 30 days of the Appeal Officer’s decision. You may be represented by an  at your own expense or you may contact the [facility] for possible representation.    [place]  for Injured Workers (NAIW): If you disagree with a  decision, you may request that NAIW represent you without charge at an  Hearing. For information regarding denial of benefits, you may contact the [facility] at: [address], [address], [place] [address], [phone], or [address] [facility] [address], [place] [address], [phone]    To File a Complaint with the Division: If you wish to file a complaint with the  of the Division of Industrial Relations (DIR),  please contact the Workers’ Compensation Section, [facility]  [address], [address], [place], [place] [address], telephone [phone]) [phone], or [address], [address], [place], [place] [address], telephone [phone].    For assistance with Workers’ Compensation Issues: You may contact the [facility] Office for Consumer Health Assistance, [address], [address], [place], [place] [address], Toll Free 1-247.905.8169, Web site: http://[facility].[place].gov/Programs/YANI E-mail: yani@[facility].[place].gov  D-2 (rev. 10/20)              __________________________________________________________________                                    ____5/14/2022_____________            Employee Name / Signature                                                                                                                            Date

## 2022-05-15 NOTE — DISCHARGE INSTRUCTIONS
He may have been exposed to some type of chemical on during this inhalation of welding dust.  Follow-up with occupational health.  Return to the emergency department for more chest pain, if you develop shortness of breath fever or other concerns.

## 2022-05-15 NOTE — ED TRIAGE NOTES
Chief Complaint   Patient presents with   • Dizziness     Dizzy ever since inhaling some aluminum/weld dust around 1500 today.   • Chest Pain     Since inhaling weld dust     Pt ambulatory to triage for above complaint. Pt reports ever since inhaling the dust he has has felt dizzy and chest pain in the center of his chest.

## 2022-05-15 NOTE — ED PROVIDER NOTES
ED Provider Note    Scribed for Deric Mora M.D. by Niharika Savage. 5/14/2022, 7:55 PM.    Primary care provider: Tee Gonzales M.D.  Means of arrival: Walk in  History obtained from: Patient  History limited by: None    CHIEF COMPLAINT  Chief Complaint   Patient presents with   • Dizziness     Dizzy ever since inhaling some aluminum/weld dust around 1500 today.   • Chest Pain     Since inhaling weld dust       HPI  Ghassan Araujo is a 31 y.o. male, with a history asthma, and PSVT, of who presents to the Emergency Department for evaluation of an occupational exposure to dust.  Patient states he inhaled aluminum weld dust, a byproduct of wielding, after he kicked some dust up while working at the [employer].  After that he developed centralized chest pain and lightheadedness Onset was 3:00 PM today. He describes his chest pain as burning in quality. He denies any room spinning sensations.. His symptoms started only after this incident. Patient has been working at the factory for 2 years. Exacerbating factors include coughing. No alleviating factors were identified. He reports associated mild diffuse headache, and cough. He denies any associated shortness of breath, nausea, or vomiting. He admits to using marijuana, and formerly used vapes and cigarettes. He has a allergy to penicillin and methotrexate. He denies taking any medications for his PSVT.  Denies any other aggravating alleviating factors or associated complaints.  No history of venous thromboembolic disease, no tearing pain radiation of the back.  No high blood pressure or diabetes.    REVIEW OF SYSTEMS  Review of Systems   Respiratory: Positive for cough. Negative for shortness of breath.    Cardiovascular: Positive for chest pain.   Gastrointestinal: Negative for nausea and vomiting.   Neurological: Positive for dizziness and headaches.   All other systems reviewed and are negative.      PAST MEDICAL HISTORY    paroxysmal  "supraventricular tachycardia     SURGICAL HISTORY   has a past surgical history that includes hand surgery (Right, ).    SOCIAL HISTORY  Social History     Tobacco Use   • Smoking status: Former Smoker     Types: Cigarettes     Quit date: 10/4/2021     Years since quittin.6   • Smokeless tobacco: Never Used   Vaping Use   • Vaping Use: Former   • Substances: Nicotine   Substance Use Topics   • Alcohol use: Yes     Comment: occ   • Drug use: Yes     Types: Marijuana      Social History     Substance and Sexual Activity   Drug Use Yes   • Types: Marijuana       FAMILY HISTORY  Family History   Problem Relation Age of Onset   • Cancer Maternal Grandmother         uterine   • Heart Disease Maternal Grandfather    • Heart Disease Paternal Grandmother    • Cancer Paternal Grandfather         Lymphoma       CURRENT MEDICATIONS  Home Medications     Reviewed by Abby Michaels R.N. (Registered Nurse) on 22 at 1851  Med List Status: Partial   Medication Last Dose Status   albuterol 108 (90 Base) MCG/ACT Aero Soln inhalation aerosol  Active   amphetamine-dextroamphetamine (ADDERALL XR, 20MG,) 20 MG per XR capsule  Active   benzonatate (TESSALON) 100 MG Cap  Active   fluticasone-salmeterol (ADVAIR HFA) 230-21 MCG/ACT inhaler  Active   PROBIOTIC PRODUCT PO  Active                ALLERGIES  Allergies   Allergen Reactions   • Penicillins Anaphylaxis   • Methotrexate Rash       PHYSICAL EXAM  VITAL SIGNS: BP (!) 150/79   Pulse 99   Temp 36.1 °C (96.9 °F) (Temporal)   Resp 18   Ht 1.778 m (5' 10\")   Wt 104 kg (229 lb 4.5 oz)   SpO2 97%   BMI 32.90 kg/m²   Vitals reviewed.  Constitutional: Well developed, Well nourished, No acute distress, Non-toxic appearance.   HENT: Normocephalic, Atraumatic, Bilateral external ears normal, Oropharynx moist, No oral exudates, Nose normal.   Eyes: PERRL, EOMI, Conjunctiva normal, No discharge.   Neck: Normal range of motion, No tenderness, Supple, No stridor. "   Cardiovascular: Normal heart rate, Normal rhythm, No murmurs, No rubs, No gallops.   Thorax & Lungs: Normal breath sounds, No respiratory distress, No wheezing, No chest tenderness.   Abdomen: Bowel sounds normal, Soft, No tenderness  Skin: Warm, Dry, No erythema, No rash.   Back: No tenderness, No CVA tenderness.   Musculoskeletal: Good range of motion in all major joints.  No asymmetric edema good pulses  Neurologic: Alert, No focal deficits noted.   Psychiatric: Affect normal    LABS  Results for orders placed or performed during the hospital encounter of 05/14/22   CBC WITH DIFFERENTIAL   Result Value Ref Range    WBC 10.4 4.8 - 10.8 K/uL    RBC 4.83 4.70 - 6.10 M/uL    Hemoglobin 14.7 14.0 - 18.0 g/dL    Hematocrit 41.0 (L) 42.0 - 52.0 %    MCV 84.9 81.4 - 97.8 fL    MCH 30.4 27.0 - 33.0 pg    MCHC 35.9 (H) 33.7 - 35.3 g/dL    RDW 36.5 35.9 - 50.0 fL    Platelet Count 312 164 - 446 K/uL    MPV 9.6 9.0 - 12.9 fL    Neutrophils-Polys 60.40 44.00 - 72.00 %    Lymphocytes 32.60 22.00 - 41.00 %    Monocytes 5.60 0.00 - 13.40 %    Eosinophils 0.80 0.00 - 6.90 %    Basophils 0.30 0.00 - 1.80 %    Immature Granulocytes 0.30 0.00 - 0.90 %    Nucleated RBC 0.00 /100 WBC    Neutrophils (Absolute) 6.28 1.82 - 7.42 K/uL    Lymphs (Absolute) 3.38 1.00 - 4.80 K/uL    Monos (Absolute) 0.58 0.00 - 0.85 K/uL    Eos (Absolute) 0.08 0.00 - 0.51 K/uL    Baso (Absolute) 0.03 0.00 - 0.12 K/uL    Immature Granulocytes (abs) 0.03 0.00 - 0.11 K/uL    NRBC (Absolute) 0.00 K/uL   COMP METABOLIC PANEL   Result Value Ref Range    Sodium 137 135 - 145 mmol/L    Potassium 3.7 3.6 - 5.5 mmol/L    Chloride 99 96 - 112 mmol/L    Co2 25 20 - 33 mmol/L    Anion Gap 13.0 7.0 - 16.0    Glucose 94 65 - 99 mg/dL    Bun 15 8 - 22 mg/dL    Creatinine 0.88 0.50 - 1.40 mg/dL    Calcium 9.3 8.5 - 10.5 mg/dL    AST(SGOT) 22 12 - 45 U/L    ALT(SGPT) 35 2 - 50 U/L    Alkaline Phosphatase 92 30 - 99 U/L    Total Bilirubin 0.9 0.1 - 1.5 mg/dL    Albumin 5.1  (H) 3.2 - 4.9 g/dL    Total Protein 7.3 6.0 - 8.2 g/dL    Globulin 2.2 1.9 - 3.5 g/dL    A-G Ratio 2.3 g/dL   TROPONIN   Result Value Ref Range    Troponin T <6 6 - 19 ng/L   ESTIMATED GFR   Result Value Ref Range    GFR (CKD-EPI) 118 >60 mL/min/1.73 m 2   EKG   Result Value Ref Range    Report       [facility] Emergency Dept.    Test Date:  2022  Pt Name:    MYLES BRAUN           Department: ER  MRN:        [id number]                      Room:  Gender:     Male                         Technician: [id number]  :        1990                   Requested By:ER TRIAGE PROTOCOL  Order #:    [id number]                    Reading MD: JEY VARGAS. HÉCTOR    Measurements  Intervals                                Axis  Rate:       90                           P:          13  TN:         144                          QRS:        58  QRSD:       109                          T:          40  QT:         352  QTc:        431    Interpretive Statements  Sinus rhythm  No previous ECG available for comparison  Electronically Signed On 2022 22:46:08 PDT by JEY VARGAS. [facility]       All labs reviewed by me.    EKG Interpretation  Interpreted by me    RADIOLOGY  DX-CHEST-PORTABLE (1 VIEW)   Final Result         1.  No acute cardiopulmonary disease.        The radiologist's interpretation of all radiological studies have been reviewed by me.    COURSE & MEDICAL DECISION MAKING  Pertinent Labs & Imaging studies reviewed. (See chart for details)    7:55 PM Patient seen and examined at bedside. The patient presents with chest pain and lightheadedness, and the differential diagnosis includes but is not limited to lung irritation from a toxic translational exposure, or irritant inhalational exposure, less likely ACS pneumonia pneumothorax PE, dissection or possibly even anxiety.. Ordered for EKG, Troponin, CMP, CBC with diff, and DX Chest to evaluate. Discussed plan of care with patient. I informed them  that labs and imaging will be ordered to evaluate symptoms. Patient is understanding and agreeable with plan.        Chest x-ray does not show pneumonia or pneumothorax.  His heart score is 0 and his EKG is unremarkable troponin is nondetectable.  His symptoms are nonspecific ACS only requires further work-up.  He is PERC negative.  He does not have a clinical history or exam suggestive of a PE or dissection.    Is observed in the emergency department developed no increased work of breathing, no hypoxia, and his symptoms have resolved.    This may be related to something he inhaled at work.  I discussed this with pharmacy.  There is no toxic inhalational exposures that we can identify that would require an antibiotic therapy.  He does require follow-up with occupational health and further work-up and treatment.    10:51 PM - Patient was reevaluated at bedside. He is resting comfortably in bed. Discussed lab and radiology results with the patient and informed them that they were reassuring. The patient will return for new or worsening symptoms and is stable at the time of discharge. He will follow up with [facility]. Patient verbalizes understanding and agreement to this plan of care.      Questions are answered he is agreeable to plan and discharged in good condition.    DISPOSITION:  Patient will be discharged home in stable condition.    FOLLOW UP:  [facility] - [place] [id number]  [address]  [facility] [address] [phone]      The patient is referred to a primary physician for blood pressure management, diabetic screening, and for all other preventative health concerns.    FINAL IMPRESSION  1. Other chest pain    2. Occupational exposure to air contaminants          Niharika RYAN (Bianka), am scribing for, and in the presence of, Deric Mora M.D..    Electronically signed by: Niharika Jeffers), 5/14/2022    Deric RYAN M.D. personally performed the  services described in this documentation, as scribed by Niharika Savage in my presence, and it is both accurate and complete.    C    The note accurately reflects work and decisions made by me.  Deric Mora M.D.  5/14/2022  11:37 PM

## 2022-05-15 NOTE — ED NOTES
Pt discharged home. Explained discharge instructions. Questions and comments addressed. Pt verbalized understanding of instructions. Pt advised to follow-up with [facility] or return to ED for any new or worsening of symptoms. Pt is ambulating well and steady on feet. VS stable.

## 2022-05-23 ENCOUNTER — TELEMEDICINE (OUTPATIENT)
Dept: MEDICAL GROUP | Facility: PHYSICIAN GROUP | Age: 32
End: 2022-05-23
Payer: COMMERCIAL

## 2022-05-23 VITALS — HEIGHT: 69 IN | WEIGHT: 230 LBS | BODY MASS INDEX: 34.07 KG/M2

## 2022-05-23 DIAGNOSIS — J45.20 MILD INTERMITTENT ASTHMA WITHOUT COMPLICATION: ICD-10-CM

## 2022-05-23 DIAGNOSIS — K51.90 ULCERATIVE COLITIS WITHOUT COMPLICATIONS, UNSPECIFIED LOCATION (HCC): ICD-10-CM

## 2022-05-23 DIAGNOSIS — F90.9 ATTENTION DEFICIT HYPERACTIVITY DISORDER (ADHD), UNSPECIFIED ADHD TYPE: Chronic | ICD-10-CM

## 2022-05-23 DIAGNOSIS — F98.8 ATTENTION DEFICIT DISORDER, UNSPECIFIED HYPERACTIVITY PRESENCE: ICD-10-CM

## 2022-05-23 PROCEDURE — 99214 OFFICE O/P EST MOD 30 MIN: CPT | Mod: 95 | Performed by: INTERNAL MEDICINE

## 2022-05-23 RX ORDER — DEXTROAMPHETAMINE SACCHARATE, AMPHETAMINE ASPARTATE MONOHYDRATE, DEXTROAMPHETAMINE SULFATE AND AMPHETAMINE SULFATE 5; 5; 5; 5 MG/1; MG/1; MG/1; MG/1
20 CAPSULE, EXTENDED RELEASE ORAL EVERY MORNING
Qty: 30 CAPSULE | Refills: 0 | Status: SHIPPED | OUTPATIENT
Start: 2022-06-17 | End: 2022-07-21 | Stop reason: SDUPTHER

## 2022-05-23 ASSESSMENT — FIBROSIS 4 INDEX: FIB4 SCORE: 0.36

## 2022-05-23 NOTE — ASSESSMENT & PLAN NOTE
Chronic stable condition.  The patient is using Advair daily.  Patient uses albuterol as needed.  Currently he is asymptomatic.

## 2022-05-23 NOTE — PROGRESS NOTES
This visit was conducted via  [employer] Video Virtual Visit using secure and encrypted videoconferencing technology.   The patient's identity was confirmed and verbal consent was obtained for this virtual visit.  -------------------------------------------------------------------------------    Chief Complaint   Patient presents with   • Follow-Up      Follow-up ADHD and refill of Adderall     HPI: This is a 31 y.o. pt.  Pt's medical history is notable for:      Ulcerative colitis (HCC)  Chronic condition.  Patient followed by GI specialist Dr. Godfrey.  Patient denies nausea vomiting abdominal pain or GI bleeding.  He also denies any change in bowel movement.  Patient denies unexplained weight loss.    ADHD  This is a chronic condition.  The patient is taking Adderall daily.  He is requesting refills.  Patient reported that the medication is working well for him.  Patient denies any side effects.    Asthma  Chronic stable condition.  The patient is using Advair daily.  Patient uses albuterol as needed.  Currently he is asymptomatic.              REVIEW OF SYSTEMS:   As per HPI above. All other systems reviewed and negative.          Allergies: Penicillins and Methotrexate    Current Outpatient Medications Ordered in Epic   Medication Sig Dispense Refill   • [START ON 6/17/2022] amphetamine-dextroamphetamine (ADDERALL XR, 20MG,) 20 MG per XR capsule Take 1 Capsule by mouth every morning for 30 days. 30 Capsule 0   • fluticasone-salmeterol (ADVAIR HFA) 230-21 MCG/ACT inhaler Inhale 2 Puffs every day. 1 Each 6   • albuterol 108 (90 Base) MCG/ACT Aero Soln inhalation aerosol Inhale 2 Puffs every 6 hours as needed for Shortness of Breath. 1 Each 2   • benzonatate (TESSALON) 100 MG Cap Take 1 Capsule by mouth 3 times a day as needed for Cough. 60 Capsule 0   • PROBIOTIC PRODUCT PO Take  by mouth every day.       No current [facility]-ordered facility-administered medications on file.       Past Medical, Social, and Family history  "reviewed and updated in EPIC    ------------------------------------------------------------------    There were no vitals filed for this visit.  Vitals:    05/23/22 1407   Weight: 104 kg (230 lb)   Height: 1.753 m (5' 9\")        PHYSICAL EXAM:   Psych:  A&O x 3, mood and affect appropriate  Constitutional: no distress  Skin: No apparent rashes  ENMT: Lips without lesions. Phonation normal.  Respiratory: Unlabored respiratory effort      ---------------------------------------------------------------------    ASSESSMENT and PLAN:     1. Attention deficit disorder, unspecified hyperactivity presence  - amphetamine-dextroamphetamine (ADDERALL XR, 20MG,) 20 MG per XR capsule; Take 1 Capsule by mouth every morning for 30 days.  Dispense: 30 Capsule; Refill: 0  Chronic stable condition.  Advised the patient continue with Adderall.  Refill sent to the pharmacy.  2. Ulcerative colitis without complications, unspecified location (HCC)  Chronic condition.  Advised the patient to continue follow-up with GI specialist as directed.  3. Mild intermittent asthma without complication    Stable condition patient currently asymptomatic.  Continue current management.             Recommend follow-up 4 months            Please note that this dictation was created using voice recognition software. I have made every reasonable attempt to correct obvious errors but there may be errors of grammar and content that I may have overlooked prior to finalization of this note.      Tee Gonzales MD  Internal Medicine  [facility]  "

## 2022-05-23 NOTE — ASSESSMENT & PLAN NOTE
Chronic condition.  Patient followed by GI specialist Dr. Godfrey.  Patient denies nausea vomiting abdominal pain or GI bleeding.  He also denies any change in bowel movement.  Patient denies unexplained weight loss.

## 2022-05-23 NOTE — ASSESSMENT & PLAN NOTE
This is a chronic condition.  The patient is taking Adderall daily.  He is requesting refills.  Patient reported that the medication is working well for him.  Patient denies any side effects.

## 2022-05-25 ENCOUNTER — OCCUPATIONAL MEDICINE (OUTPATIENT)
Dept: OCCUPATIONAL MEDICINE | Facility: CLINIC | Age: 32
End: 2022-05-25
Payer: COMMERCIAL

## 2022-05-25 VITALS
BODY MASS INDEX: 34.07 KG/M2 | SYSTOLIC BLOOD PRESSURE: 124 MMHG | RESPIRATION RATE: 18 BRPM | HEART RATE: 110 BPM | DIASTOLIC BLOOD PRESSURE: 76 MMHG | OXYGEN SATURATION: 98 % | WEIGHT: 230 LBS | HEIGHT: 69 IN

## 2022-05-25 DIAGNOSIS — Z77.9 EXPOSURE TO RESPIRATORY IRRITANT: ICD-10-CM

## 2022-05-25 PROCEDURE — 99213 OFFICE O/P EST LOW 20 MIN: CPT | Performed by: NURSE PRACTITIONER

## 2022-05-25 ASSESSMENT — FIBROSIS 4 INDEX: FIB4 SCORE: 0.36

## 2022-05-25 NOTE — LETTER
[address],   [address] [place] [address]  Phone:  [phone] - Fax:  [phone]   Occupational Health [facility] Progress Report and Disability Certification  Date of Service: 5/25/2022   No Show:  No  Date / Time of Next Visit:  Discharged/MMI Released to Full Duty   Claim Information   Patient Name: Ghassan Araujo  Claim Number:     Employer: [employer]  Date of Injury: 5/14/2022     Insurer / TPA: [employer]  ID / SSN:     Occupation: Equipment Maintenance  Diagnosis: The encounter diagnosis was Exposure to respiratory irritant.    Medical Information   Related to Industrial Injury?   Comments:Indeterminant    Subjective Complaints:  DOI 5/14/2022: With a history asthma, and PSVT, of who presents to the Emergency Department for evaluation of an occupational exposure to dust.  Patient states he inhaled aluminum weld dust, a byproduct of wielding, after he kicked some dust up while working at the "[employer].  After that he developed centralized chest pain and lightheadedness.  Patient seen in ED x1 for this injury.  Chest x-ray, lab work, and EKG all within normal limits.  Today patient states overall he feels much improved.  He states that he does take a daily inhaler but this is normal for his asthma.  He has had no new or worsening symptoms since the injury.  He has been back to work without difficulty.  He denies coughing, shortness of breath, or cough no blood.  He will be released from care at this time.   Objective Findings: Chest: Rises and falls symmetrically.  No gross deformity or discoloration noted.  Lungs clear to auscultation in all lung fields.  Negative wheezing, crackles, or rales noted.   Pre-Existing Condition(s):     Assessment:   Condition Improved    Status: Discharged /  MMI  Permanent Disability:No    Plan:      Diagnostics:      Comments:  Discharged/MMI  Released to full duty  Follow-up if needed     Disability Information   Status: Released to Full Duty    From:  5/25/2022  Through:   Restrictions are:     Physical Restrictions   Sitting:    Standing:    Stooping:    Bending:      Squatting:    Walking:    Climbing:    Pushing:      Pulling:    Other:    Reaching Above Shoulder (L):   Reaching Above Shoulder (R):       Reaching Below Shoulder (L):    Reaching Below Shoulder (R):      Not to exceed Weight Limits   Carrying(hrs):   Weight Limit(lb):   Lifting(hrs):   Weight  Limit(lb):     Comments:      Repetitive Actions   Hands: i.e. Fine Manipulations from Grasping:     Feet: i.e. Operating Foot Controls:     Driving / Operate Machinery:     Health Care Provider’s Original or Electronic Signature  LIZETH Menendez Health Care Provider’s Original or Electronic Signature    Jj Martin MD         Clinic Name / Location: [address],   [address] [address] Clinic Phone Number: Dept: [phone]   Appointment Time: 1:15 Pm Visit Start Time: 1:12 PM   Check-In Time:  1:04 Pm Visit Discharge Time:  1:40pm   Original-Treating Physician or Chiropractor    Page 2-Insurer/TPA    Page 3-Employer    Page 4-Employee

## 2022-05-25 NOTE — PROGRESS NOTES
"Subjective:     Ghassan Araujo is a 31 y.o. male who presents for Follow-Up ([facility] [employer] DOI 5/11/22 lungs, better, rm 16)      DOI 5/14/2022: With a history asthma, and PSVT, of who presents to the Emergency Department for evaluation of an occupational exposure to dust.  Patient states he inhaled aluminum weld dust, a byproduct of wielding, after he kicked some dust up while working at the [employer].  After that he developed centralized chest pain and lightheadedness.  Patient seen in ED x1 for this injury.  Chest x-ray, lab work, and EKG all within normal limits.  Today patient states overall he feels much improved.  He states that he does take a daily inhaler but this is normal for his asthma.  He has had no new or worsening symptoms since the injury.  He has been back to work without difficulty.  He denies coughing, shortness of breath, or cough no blood.  He will be released from care at this time.    ROS: All systems were reviewed on intake form, form was reviewed and signed. See scanned documents in media. Pertinent positives and negatives included in HPI.    PMH: No pertinent past medical history to this problem  MEDS: Medications were reviewed in Epic  ALLERGIES:   Allergies   Allergen Reactions   • Penicillins Anaphylaxis   • Methotrexate Rash     SOCHX: Works as Equipment Maintenance at [employer]  FH: No pertinent family history to this problem       Objective:     /76   Pulse (!) 110   Resp 18   Ht 1.753 m (5' 9\")   Wt 104 kg (230 lb)   SpO2 98%   BMI 33.97 kg/m²     [unfilled]    Chest: Rises and falls symmetrically.  No gross deformity or discoloration noted.  Lungs clear to auscultation in all lung fields.  Negative wheezing, crackles, or rales noted.    Assessment/Plan:       1. Exposure to respiratory irritant    Released to Full Duty FROM 5/25/2022 TO       Discharged/MMI  Released to full duty  Follow-up if needed    Differential diagnosis, natural history, " supportive care, and indications for immediate follow-up discussed.    Approximately 25 minutes were spent in reviewing notes, preparing for visit, obtaining history, exam and evaluation, patient counseling/education and post visit documentation/orders.

## 2022-06-01 ENCOUNTER — OFFICE VISIT (OUTPATIENT)
Dept: URGENT CARE | Facility: PHYSICIAN GROUP | Age: 32
End: 2022-06-01
Payer: COMMERCIAL

## 2022-06-01 ENCOUNTER — APPOINTMENT (OUTPATIENT)
Dept: RADIOLOGY | Facility: IMAGING CENTER | Age: 32
End: 2022-06-01
Attending: PHYSICIAN ASSISTANT
Payer: COMMERCIAL

## 2022-06-01 VITALS
BODY MASS INDEX: 34.07 KG/M2 | HEART RATE: 98 BPM | DIASTOLIC BLOOD PRESSURE: 72 MMHG | HEIGHT: 69 IN | WEIGHT: 230 LBS | TEMPERATURE: 97.1 F | RESPIRATION RATE: 16 BRPM | SYSTOLIC BLOOD PRESSURE: 142 MMHG | OXYGEN SATURATION: 98 %

## 2022-06-01 DIAGNOSIS — S99.912A INJURY OF LEFT ANKLE, INITIAL ENCOUNTER: ICD-10-CM

## 2022-06-01 DIAGNOSIS — S96.912A STRAIN OF LEFT ANKLE, INITIAL ENCOUNTER: ICD-10-CM

## 2022-06-01 PROCEDURE — 99213 OFFICE O/P EST LOW 20 MIN: CPT | Performed by: PHYSICIAN ASSISTANT

## 2022-06-01 PROCEDURE — 73610 X-RAY EXAM OF ANKLE: CPT | Mod: TC,FY,LT | Performed by: RADIOLOGY

## 2022-06-01 RX ORDER — METHYLPREDNISOLONE 4 MG/1
TABLET ORAL
Qty: 21 TABLET | Refills: 0 | Status: SHIPPED
Start: 2022-06-01 | End: 2023-01-16

## 2022-06-01 ASSESSMENT — ENCOUNTER SYMPTOMS
INABILITY TO BEAR WEIGHT: 1
MUSCLE WEAKNESS: 0
TINGLING: 0
FEVER: 0
NAUSEA: 0
SENSORY CHANGE: 0
VOMITING: 0
CHILLS: 0

## 2022-06-01 ASSESSMENT — FIBROSIS 4 INDEX: FIB4 SCORE: 0.36

## 2022-06-01 ASSESSMENT — PAIN SCALES - GENERAL: PAINLEVEL: 7=MODERATE-SEVERE PAIN

## 2022-06-01 NOTE — PROGRESS NOTES
Subjective     Ghassan Araujo is a 31 y.o. male who presents with Ankle Injury (L ankle, x3days )    Ankle Injury   The incident occurred 3 to 5 days ago. The injury mechanism was a twisting injury (Patient states that he was attending a concert on Sunday and was jumping up and down in the [facility] pit.  He does think he mildly twisted his ankle at that time but did not have any discomfort until the following morning.  ). The pain is present in the left ankle. The pain is moderate. The pain has been constant since onset. Associated symptoms include an inability to bear weight. Pertinent negatives include no muscle weakness or tingling. He reports no foreign bodies present. The symptoms are aggravated by palpation, weight bearing and movement. Treatments tried: Ice, heat, ibuprofen Tylenol. Improvement on treatment: Ice provides moderate relief.  No significant relief with any of the other treatments.   Patient does have some history of inflammatory arthritis and pseudogout in some of his joints.    Review of Systems   Constitutional: Negative for chills and fever.   Gastrointestinal: Negative for nausea and vomiting.   Musculoskeletal:        Left ankle pain   Neurological: Negative for tingling and sensory change.         PMH:  has no past medical history on file.  MEDS:   Current Outpatient Medications:   •  methylPREDNISolone (MEDROL DOSEPAK) 4 MG Tablet Therapy Pack, Follow schedule on package instructions., Disp: 21 Tablet, Rfl: 0  •  [START ON 6/17/2022] amphetamine-dextroamphetamine (ADDERALL XR, 20MG,) 20 MG per XR capsule, Take 1 Capsule by mouth every morning for 30 days., Disp: 30 Capsule, Rfl: 0  •  fluticasone-salmeterol (ADVAIR HFA) 230-21 MCG/ACT inhaler, Inhale 2 Puffs every day., Disp: 1 Each, Rfl: 6  •  albuterol 108 (90 Base) MCG/ACT Aero Soln inhalation aerosol, Inhale 2 Puffs every 6 hours as needed for Shortness of Breath., Disp: 1 Each, Rfl: 2  •  PROBIOTIC PRODUCT PO, Take  by mouth  "every day., Disp: , Rfl:   •  benzonatate (TESSALON) 100 MG Cap, Take 1 Capsule by mouth 3 times a day as needed for Cough. (Patient not taking: Reported on 6/1/2022), Disp: 60 Capsule, Rfl: 0  ALLERGIES:   Allergies   Allergen Reactions   • Penicillins Anaphylaxis   • Methotrexate Rash     SURGHX:   Past Surgical History:   Procedure Laterality Date   • HAND SURGERY Right 2003     SOCHX:  reports that he quit smoking about 7 months ago. His smoking use included cigarettes. He has never used smokeless tobacco. He reports current alcohol use. He reports current drug use. Drug: Marijuana.  FH: Family history was reviewed, no pertinent findings to report      Objective     BP (!) 142/72   Pulse 98   Temp 36.2 °C (97.1 °F) (Temporal)   Resp 16   Ht 1.753 m (5' 9\")   Wt 104 kg (230 lb)   SpO2 98%   BMI 33.97 kg/m²      Physical Exam  Constitutional:       Appearance: He is well-developed.   HENT:      Head: Normocephalic and atraumatic.      Right Ear: External ear normal.      Left Ear: External ear normal.   Eyes:      Conjunctiva/sclera: Conjunctivae normal.      Pupils: Pupils are equal, round, and reactive to light.   Cardiovascular:      Rate and Rhythm: Normal rate and regular rhythm.      Heart sounds: Normal heart sounds. No murmur heard.  Pulmonary:      Effort: Pulmonary effort is normal.      Breath sounds: Normal breath sounds. No wheezing.   Musculoskeletal:      Left ankle: No swelling, ecchymosis or lacerations. Tenderness present over the medial malleolus, ATF ligament and CF ligament. No base of 5th metatarsal or proximal fibula tenderness. Decreased range of motion. Anterior drawer test negative. Normal pulse.      Left Achilles Tendon: Normal.      Comments: DP pulses 2+ and symmetric bilaterally.  Patient can apply some gentle pressure when standing in one position but will not walk on his left foot.   Skin:     General: Skin is warm and dry.      Capillary Refill: Capillary refill takes less " than 2 seconds.   Neurological:      Mental Status: He is alert and oriented to person, place, and time.   Psychiatric:         Behavior: Behavior normal.         Judgment: Judgment normal.         DX-ANKLE 3+ VIEWS LEFT  FINDINGS:     There is no focal soft tissue swelling.     There is no displaced fracture or dislocation.     The alignment of the ankle is within normal limits.     There is a well corticated oval ossific density projecting inferior to the medial malleolus likely due to old injury. There is a posterior superior calcaneal enthesophyte.     IMPRESSION:  1.  There is no acute fracture or malalignment of the left ankle.        *X-rays were reviewed and interpreted independently by me. I agree with the radiologist's findings     Assessment & Plan     1. Strain of left ankle, initial encounter  - DX-ANKLE 3+ VIEWS LEFT; Future  - methylPREDNISolone (MEDROL DOSEPAK) 4 MG Tablet Therapy Pack; Follow schedule on package instructions.  Dispense: 21 Tablet; Refill: 0  Patient presents with possible ankle strain.  I believe his symptoms may be exacerbating some of his inflammatory arthritis.  We will treat with a Medrol Dosepak.  Cautioned not to use OTC NSAIDs while on the steroids.  He may supplement with OTC Tylenol as needed for pain.  Also recommend that he apply ice to the affected area multiple times per day, keep the extremity elevated, and continue to use his ankle brace.  If no significant improvement in symptoms after few days he should consider following up with sports medicine.            Differential Diagnosis, natural history, and supportive care discussed. Return to the Urgent Care or follow up with your PCP if symptoms fail to resolve, or for any new or worsening symptoms. Emergency room precautions discussed. Patient and/or family appears understanding of information.

## 2022-06-01 NOTE — LETTER
June 1, 2022         Patient: Ghassan Araujo   YOB: 1990   Date of Visit: 6/1/2022           To Whom it May Concern:    Ghassan Araujo was seen in my clinic on 6/1/2022.  Please excuse his absence from work for the next 3 days.    If you have any questions or concerns, please don't hesitate to call.        Sincerely,           Pretty Kaplan P.A.-C.  Electronically Signed

## 2022-07-07 ENCOUNTER — TELEMEDICINE (OUTPATIENT)
Dept: MEDICAL GROUP | Facility: PHYSICIAN GROUP | Age: 32
End: 2022-07-07
Payer: COMMERCIAL

## 2022-07-07 VITALS — BODY MASS INDEX: 34.07 KG/M2 | HEIGHT: 69 IN | WEIGHT: 230 LBS

## 2022-07-07 DIAGNOSIS — U07.1 COVID-19: ICD-10-CM

## 2022-07-07 DIAGNOSIS — F90.9 ATTENTION DEFICIT HYPERACTIVITY DISORDER (ADHD), UNSPECIFIED ADHD TYPE: Chronic | ICD-10-CM

## 2022-07-07 DIAGNOSIS — J45.20 MILD INTERMITTENT ASTHMA WITHOUT COMPLICATION: ICD-10-CM

## 2022-07-07 PROCEDURE — 99213 OFFICE O/P EST LOW 20 MIN: CPT | Mod: 95 | Performed by: INTERNAL MEDICINE

## 2022-07-07 ASSESSMENT — FIBROSIS 4 INDEX: FIB4 SCORE: 0.36

## 2022-07-07 NOTE — ASSESSMENT & PLAN NOTE
Patient reported that he was seen in the emergency room at [facility] July 2, 2022 and tested positive for COVID infection.  Patient reported that his symptoms started on around July 1, 2022.    Patient denies fever chills shortness of breath or wheezing.  Current symptoms include runny nose, intermittent headaches and sensitivities to lights.  Patient denies shortness of breath wheezing chest pain abdominal pain.  No problems with eating and drinking.    Patient has been monitor his vital signs at home O2 saturation has been ranging from 96 to 98% on room air.

## 2022-07-07 NOTE — PROGRESS NOTES
This visit was conducted via  [employer] Video Virtual Visit using secure and encrypted videoconferencing technology.   The patient's identity was confirmed and verbal consent was obtained for this virtual visit.  -------------------------------------------------------------------------------    Chief Complaint   Patient presents with   • Follow-Up      Follow-up ER visit COVID infection     HPI: This is a 31 y.o. pt.  Pt's medical history is notable for:      COVID-19  Patient reported that he was seen in the emergency room at [facility] July 2, 2022 and tested positive for COVID infection.  Patient reported that his symptoms started on around July 1, 2022.    Patient denies fever chills shortness of breath or wheezing.  Current symptoms include runny nose, intermittent headaches and sensitivities to lights.  Patient denies shortness of breath wheezing chest pain abdominal pain.  No problems with eating and drinking.    Patient has been monitor his vital signs at home O2 saturation has been ranging from 96 to 98% on room air.    ADHD  Chronic condition.  The patient is currently taking Adderall daily.  No significant side effects reported    Asthma  Chronic condition.  Patient is currently taking albuterol as needed.              REVIEW OF SYSTEMS:   As per HPI above. All other systems reviewed and negative.          Allergies: Penicillins and Methotrexate    Current Outpatient Medications Ordered in Epic   Medication Sig Dispense Refill   • methylPREDNISolone (MEDROL DOSEPAK) 4 MG Tablet Therapy Pack Follow schedule on package instructions. 21 Tablet 0   • amphetamine-dextroamphetamine (ADDERALL XR, 20MG,) 20 MG per XR capsule Take 1 Capsule by mouth every morning for 30 days. 30 Capsule 0   • fluticasone-salmeterol (ADVAIR HFA) 230-21 MCG/ACT inhaler Inhale 2 Puffs every day. 1 Each 6   • albuterol 108 (90 Base) MCG/ACT Aero Soln inhalation aerosol Inhale 2 Puffs every 6 hours as needed for Shortness of Breath. 1  "Each 2   • benzonatate (TESSALON) 100 MG Cap Take 1 Capsule by mouth 3 times a day as needed for Cough. 60 Capsule 0   • PROBIOTIC PRODUCT PO Take  by mouth every day.       No current [facility]-ordered facility-administered medications on file.       Past Medical, Social, and Family history reviewed and updated in EPIC    ------------------------------------------------------------------    There were no vitals filed for this visit.  Vitals:    07/07/22 1608   Weight: 104 kg (230 lb)   Height: 1.753 m (5' 9\")        PHYSICAL EXAM:   Psych:  A&O x 3, mood and affect appropriate  Constitutional: no distress  Skin: No apparent rashes  ENMT: Lips without lesions. Phonation normal.  Respiratory: Unlabored respiratory effort      ---------------------------------------------------------------------    ASSESSMENT and PLAN:     1. COVID-19      A. Pt's dyspnea assessment:  [x] No dyspnea      B. Pt's oxygenation assessment: 96 to 90% on room air.        C. Other acuity factors:   Chest pain/discomfort  []Yes  [x]No    Dizziness   []Yes  [x]No      Hypotension   []Yes  [x]No       Dizziness/lightheadedness []Yes  [x]No     Confusion   []Yes  [x]No    Lethargy   []Yes  [x]No    Fever / chills   []Yes  [x]No        Recommendations:    Patient's symptom onset more than 5 days therefore patient not qualify for   PAXLOVID or  MOLNUPIRAVIR  Also discussed other alternative therapy : BEBTELOVIMAB with pt.  Bebtelovimab [IV infusion for symptom onset within 7 days]  Discussed with the pt regarding possible safety/ side effects /drug interactions   Adverse effects may including but not limited to : serious allergic reaction/ anaphylactic reactions, infusion reaction, injection site reactions, medical worsening of COVID-19 after administration such as fever, hypoxia or increased respiratory difficulty, arrhythmia, fatigue, altered mental status,nausea, vomiting, intestinal obstruction, shortness of breath, fever, chills, urticaria, " pruritus, abdominal pain, flushing, etc...  Some of these events may require hospitalization  In addition there could be other potential serious side effects that we may not know of yet....   Pt voiced understanding.     [x] pt declined treatment     -Discussed with pt regarding symptom management with medications :   Tylenol 500mg q6hrs as needed for fever, headaches, pain  For sore throat: rec warm salt water gargles soft foods. Throat coats /chloraseptic sprays prn.  Saline nasal spray and Flonase as a decongestant  Mucinex [over the counter ] as needed to help with loosening mucous in the airways.  Tessalon 100mg tid prn for cough    -Advised pt regarding isolation/quarantine as recommended by CDC guidelines. Use face mask.  Pt rec to refer to CDC COVID-19 website general questions about covid19.      -Recommend supportive care:   Stay well hydrated, rest and the use of incentive spirometry [available over the counter] to help reexpand the lungs  -Recommend the use of pulse oximeter [available over the counter] to check for blood oxygen level and the goal to keep the blood oxygen saturation at or above 90%  -Check temperature twice daily    Rec pt to go to ER if Temp >101, chills, Oxygen saturation <90%, new or worsening shortness of breath or trouble breathing, worsening cough, chest pain, dizziness, lightheadedness, lethargy, confusion, headaches, change in vision, motor weakness, abdominal pain, the inability to keep fluids/ foods down, etc... or any change in the pt's condition.       Rec pt to arrange a followup virtual appointments with pcp in approx : 7days      Have written a note for the patient to return to work on July 12, 2022.              2. Attention deficit hyperactivity disorder (ADHD), unspecified ADHD type  Chronic stable condition.  Continue with Adderall.  3. Mild intermittent asthma without complication     Stable condition continue take albuterol as needed.                    Please note  that this dictation was created using voice recognition software. I have made every reasonable attempt to correct obvious errors but there may be errors of grammar and content that I may have overlooked prior to finalization of this note.      Tee Gonzales MD  Internal Medicine  [facility]

## 2022-07-07 NOTE — ASSESSMENT & PLAN NOTE
Chronic condition.  The patient is currently taking Adderall daily.  No significant side effects reported

## 2022-07-21 DIAGNOSIS — F98.8 ATTENTION DEFICIT DISORDER, UNSPECIFIED HYPERACTIVITY PRESENCE: ICD-10-CM

## 2022-07-21 RX ORDER — DEXTROAMPHETAMINE SACCHARATE, AMPHETAMINE ASPARTATE MONOHYDRATE, DEXTROAMPHETAMINE SULFATE AND AMPHETAMINE SULFATE 5; 5; 5; 5 MG/1; MG/1; MG/1; MG/1
20 CAPSULE, EXTENDED RELEASE ORAL EVERY MORNING
Qty: 30 CAPSULE | Refills: 0 | Status: SHIPPED | OUTPATIENT
Start: 2022-07-21 | End: 2022-08-21 | Stop reason: SDUPTHER

## 2022-08-21 DIAGNOSIS — F98.8 ATTENTION DEFICIT DISORDER, UNSPECIFIED HYPERACTIVITY PRESENCE: ICD-10-CM

## 2022-08-23 RX ORDER — DEXTROAMPHETAMINE SACCHARATE, AMPHETAMINE ASPARTATE MONOHYDRATE, DEXTROAMPHETAMINE SULFATE AND AMPHETAMINE SULFATE 5; 5; 5; 5 MG/1; MG/1; MG/1; MG/1
20 CAPSULE, EXTENDED RELEASE ORAL EVERY MORNING
Qty: 30 CAPSULE | Refills: 0 | Status: SHIPPED | OUTPATIENT
Start: 2022-08-23 | End: 2022-09-28 | Stop reason: SDUPTHER

## 2022-09-28 DIAGNOSIS — F98.8 ATTENTION DEFICIT DISORDER, UNSPECIFIED HYPERACTIVITY PRESENCE: ICD-10-CM

## 2022-09-28 RX ORDER — DEXTROAMPHETAMINE SACCHARATE, AMPHETAMINE ASPARTATE MONOHYDRATE, DEXTROAMPHETAMINE SULFATE AND AMPHETAMINE SULFATE 5; 5; 5; 5 MG/1; MG/1; MG/1; MG/1
20 CAPSULE, EXTENDED RELEASE ORAL EVERY MORNING
Qty: 30 CAPSULE | Refills: 0 | Status: SHIPPED | OUTPATIENT
Start: 2022-09-28 | End: 2022-10-17 | Stop reason: SDUPTHER

## 2022-10-05 ENCOUNTER — HOSPITAL ENCOUNTER (OUTPATIENT)
Dept: LAB | Facility: MEDICAL CENTER | Age: 32
End: 2022-10-05
Attending: PHYSICIAN ASSISTANT
Payer: COMMERCIAL

## 2022-10-05 ENCOUNTER — OFFICE VISIT (OUTPATIENT)
Dept: URGENT CARE | Facility: PHYSICIAN GROUP | Age: 32
End: 2022-10-05
Payer: COMMERCIAL

## 2022-10-05 VITALS
HEART RATE: 93 BPM | TEMPERATURE: 97.9 F | OXYGEN SATURATION: 98 % | BODY MASS INDEX: 32.73 KG/M2 | WEIGHT: 233.8 LBS | RESPIRATION RATE: 16 BRPM | DIASTOLIC BLOOD PRESSURE: 86 MMHG | SYSTOLIC BLOOD PRESSURE: 148 MMHG | HEIGHT: 71 IN

## 2022-10-05 DIAGNOSIS — R19.7 DIARRHEA, UNSPECIFIED TYPE: ICD-10-CM

## 2022-10-05 DIAGNOSIS — R11.0 NAUSEA: ICD-10-CM

## 2022-10-05 DIAGNOSIS — Z87.19 HISTORY OF ULCERATIVE COLITIS: ICD-10-CM

## 2022-10-05 DIAGNOSIS — R74.8 ELEVATED LIPASE: ICD-10-CM

## 2022-10-05 DIAGNOSIS — R10.13 EPIGASTRIC ABDOMINAL PAIN: ICD-10-CM

## 2022-10-05 LAB
ALBUMIN SERPL BCP-MCNC: 4.5 G/DL (ref 3.2–4.9)
ALBUMIN/GLOB SERPL: 1.4 G/DL
ALP SERPL-CCNC: 96 U/L (ref 30–99)
ALT SERPL-CCNC: 28 U/L (ref 2–50)
ANION GAP SERPL CALC-SCNC: 11 MMOL/L (ref 7–16)
AST SERPL-CCNC: 17 U/L (ref 12–45)
BILIRUB SERPL-MCNC: 0.3 MG/DL (ref 0.1–1.5)
BUN SERPL-MCNC: 13 MG/DL (ref 8–22)
CALCIUM SERPL-MCNC: 9.5 MG/DL (ref 8.5–10.5)
CHLORIDE SERPL-SCNC: 98 MMOL/L (ref 96–112)
CO2 SERPL-SCNC: 26 MMOL/L (ref 20–33)
CREAT SERPL-MCNC: 0.81 MG/DL (ref 0.5–1.4)
CRP SERPL HS-MCNC: <0.3 MG/DL (ref 0–0.75)
ERYTHROCYTE [DISTWIDTH] IN BLOOD BY AUTOMATED COUNT: 38.2 FL (ref 35.9–50)
ERYTHROCYTE [SEDIMENTATION RATE] IN BLOOD BY WESTERGREN METHOD: 8 MM/HOUR (ref 0–20)
G LAMBLIA+C PARVUM AG STL QL RAPID: NORMAL
GFR SERPLBLD CREATININE-BSD FMLA CKD-EPI: 121 ML/MIN/1.73 M 2
GLOBULIN SER CALC-MCNC: 3.2 G/DL (ref 1.9–3.5)
GLUCOSE SERPL-MCNC: 86 MG/DL (ref 65–99)
HCT VFR BLD AUTO: 46.4 % (ref 42–52)
HGB BLD-MCNC: 16.4 G/DL (ref 14–18)
LIPASE SERPL-CCNC: 137 U/L (ref 11–82)
MCH RBC QN AUTO: 30.6 PG (ref 27–33)
MCHC RBC AUTO-ENTMCNC: 35.3 G/DL (ref 33.7–35.3)
MCV RBC AUTO: 86.6 FL (ref 81.4–97.8)
PLATELET # BLD AUTO: 295 K/UL (ref 164–446)
PMV BLD AUTO: 10.5 FL (ref 9–12.9)
POTASSIUM SERPL-SCNC: 4 MMOL/L (ref 3.6–5.5)
PROT SERPL-MCNC: 7.7 G/DL (ref 6–8.2)
RBC # BLD AUTO: 5.36 M/UL (ref 4.7–6.1)
SIGNIFICANT IND 70042: NORMAL
SITE SITE: NORMAL
SODIUM SERPL-SCNC: 135 MMOL/L (ref 135–145)
SOURCE SOURCE: NORMAL
WBC # BLD AUTO: 9 K/UL (ref 4.8–10.8)

## 2022-10-05 PROCEDURE — 99215 OFFICE O/P EST HI 40 MIN: CPT | Performed by: PHYSICIAN ASSISTANT

## 2022-10-05 PROCEDURE — 87899 AGENT NOS ASSAY W/OPTIC: CPT

## 2022-10-05 PROCEDURE — 87045 FECES CULTURE AEROBIC BACT: CPT

## 2022-10-05 PROCEDURE — 85027 COMPLETE CBC AUTOMATED: CPT

## 2022-10-05 PROCEDURE — 85652 RBC SED RATE AUTOMATED: CPT

## 2022-10-05 PROCEDURE — 87328 CRYPTOSPORIDIUM AG IA: CPT

## 2022-10-05 PROCEDURE — 36415 COLL VENOUS BLD VENIPUNCTURE: CPT

## 2022-10-05 PROCEDURE — 86140 C-REACTIVE PROTEIN: CPT

## 2022-10-05 PROCEDURE — 80053 COMPREHEN METABOLIC PANEL: CPT

## 2022-10-05 PROCEDURE — 83690 ASSAY OF LIPASE: CPT

## 2022-10-05 PROCEDURE — 87329 GIARDIA AG IA: CPT

## 2022-10-05 RX ORDER — ONDANSETRON 4 MG/1
4 TABLET, FILM COATED ORAL EVERY 4 HOURS PRN
Qty: 10 TABLET | Refills: 0 | Status: SHIPPED
Start: 2022-10-05 | End: 2023-01-16

## 2022-10-05 ASSESSMENT — ENCOUNTER SYMPTOMS
ABDOMINAL PAIN: 1
SWEATS: 1
VOMITING: 0
DIARRHEA: 1
CHILLS: 1
MYALGIAS: 1
BLOATING: 1
FEVER: 1

## 2022-10-05 ASSESSMENT — FIBROSIS 4 INDEX: FIB4 SCORE: 0.37

## 2022-10-05 NOTE — PROGRESS NOTES
Subjective:   Ghassan Araujo is a 32 y.o. male who presents for GI Problem (X5days has been having diarrhea, nausea w/o vomiting, body aches, low grade fever, lethargic )        Diarrhea   This is a new problem. Episode onset: 5 days. The problem occurs more than 10 times per day. The problem has been waxing and waning (improved and then worsened yesterday.). The stool consistency is described as Watery. The patient states that diarrhea awakens him from sleep. Associated symptoms include abdominal pain (Mild generalized.), bloating, chills, a fever (Subjective), myalgias and sweats. Pertinent negatives include no URI or vomiting. Risk factors: Recently traveled to [facility]. Treatments tried: probiotics. Ulcerative colitis-thought to be in remission.  UC flares previously treated with Lialda.  He is established with GI.   Review of Systems   Constitutional:  Positive for chills and fever (Subjective).   Gastrointestinal:  Positive for abdominal pain (Mild generalized.), bloating and diarrhea. Negative for vomiting.   Musculoskeletal:  Positive for myalgias.     PMH:  has no past medical history on file.  MEDS:   Current Outpatient Medications:     ondansetron (ZOFRAN) 4 MG Tab tablet, Take 1 Tablet by mouth every four hours as needed for Nausea/Vomiting., Disp: 10 Tablet, Rfl: 0    amphetamine-dextroamphetamine (ADDERALL XR, 20MG,) 20 MG per XR capsule, Take 1 Capsule by mouth every morning for 30 days., Disp: 30 Capsule, Rfl: 0    methylPREDNISolone (MEDROL DOSEPAK) 4 MG Tablet Therapy Pack, Follow schedule on package instructions., Disp: 21 Tablet, Rfl: 0    fluticasone-salmeterol (ADVAIR HFA) 230-21 MCG/ACT inhaler, Inhale 2 Puffs every day., Disp: 1 Each, Rfl: 6    albuterol 108 (90 Base) MCG/ACT Aero Soln inhalation aerosol, Inhale 2 Puffs every 6 hours as needed for Shortness of Breath., Disp: 1 Each, Rfl: 2    benzonatate (TESSALON) 100 MG Cap, Take 1 Capsule by mouth 3 times a day as  "needed for Cough., Disp: 60 Capsule, Rfl: 0    PROBIOTIC PRODUCT PO, Take  by mouth every day., Disp: , Rfl:   ALLERGIES:   Allergies   Allergen Reactions    Penicillins Anaphylaxis    Methotrexate Rash     SURGHX:   Past Surgical History:   Procedure Laterality Date    HAND SURGERY Right 2003     SOCHX:  reports that he quit smoking about a year ago. His smoking use included cigarettes. His smokeless tobacco use includes snuff and chew. He reports current alcohol use. He reports current drug use. Drug: Marijuana.  FH: Family history was reviewed, no pertinent findings to report   Objective:   BP (!) 148/86 (BP Location: Right arm, Patient Position: Sitting, BP Cuff Size: Adult)   Pulse 93   Temp 36.6 °C (97.9 °F) (Temporal)   Resp 16   Ht 1.803 m (5' 11\")   Wt 106 kg (233 lb 12.8 oz)   SpO2 98%   BMI 32.61 kg/m²   Physical Exam  Vitals reviewed.   Constitutional:       General: He is not in acute distress.     Appearance: Normal appearance. He is well-developed. He is not toxic-appearing.   HENT:      Head: Normocephalic and atraumatic.      Right Ear: External ear normal.      Left Ear: External ear normal.      Nose: Nose normal.   Cardiovascular:      Rate and Rhythm: Normal rate and regular rhythm.      Heart sounds: Normal heart sounds, S1 normal and S2 normal.   Pulmonary:      Effort: Pulmonary effort is normal. No respiratory distress.      Breath sounds: Normal breath sounds. No stridor. No decreased breath sounds, wheezing, rhonchi or rales.   Abdominal:      Tenderness: There is no right CVA tenderness or left CVA tenderness.      Comments: Hyperactive bowel sounds.  Patient's abdomen is soft.  No palpable masses or organomegaly.  Mild generalized tenderness of the slightly more significant in the epigastrium and right upper quadrant.  Negative Bardales's.  Negative McBurney's point tenderness.   Skin:     General: Skin is dry.   Neurological:      Comments: Alert and oriented.    Psychiatric:       "   Speech: Speech normal.         Behavior: Behavior normal.         Lab Results   Component Value Date/Time    WBC 9.0 10/05/2022 10:58 AM    RBC 5.36 10/05/2022 10:58 AM    HEMOGLOBIN 16.4 10/05/2022 10:58 AM    HEMATOCRIT 46.4 10/05/2022 10:58 AM    MCV 86.6 10/05/2022 10:58 AM    MCH 30.6 10/05/2022 10:58 AM    MCHC 35.3 10/05/2022 10:58 AM    MPV 10.5 10/05/2022 10:58 AM    NEUTSPOLYS 60.40 05/14/2022 09:14 PM    LYMPHOCYTES 32.60 05/14/2022 09:14 PM    MONOCYTES 5.60 05/14/2022 09:14 PM    EOSINOPHILS 0.80 05/14/2022 09:14 PM    BASOPHILS 0.30 05/14/2022 09:14 PM      Lab Results   Component Value Date/Time    SODIUM 135 10/05/2022 10:58 AM    POTASSIUM 4.0 10/05/2022 10:58 AM    CHLORIDE 98 10/05/2022 10:58 AM    CO2 26 10/05/2022 10:58 AM    GLUCOSE 86 10/05/2022 10:58 AM    BUN 13 10/05/2022 10:58 AM    CREATININE 0.81 10/05/2022 10:58 AM        Assessment/Plan:   1. Epigastric abdominal pain  - CBC WITHOUT DIFFERENTIAL; Future  - Comp Metabolic Panel; Future  - LIPASE; Future  - CULTURE STOOL; Future  - CRYPTO/GIARDIA RAPID ASSAY; Future  - CRP QUANTITIVE (NON-CARDIAC); Future  - Sed Rate; Future    2. Diarrhea, unspecified type  - CRP QUANTITIVE (NON-CARDIAC); Future  - Sed Rate; Future    3. Nausea  - ondansetron (ZOFRAN) 4 MG Tab tablet; Take 1 Tablet by mouth every four hours as needed for Nausea/Vomiting.  Dispense: 10 Tablet; Refill: 0  - CRP QUANTITIVE (NON-CARDIAC); Future  - Sed Rate; Future    4. History of ulcerative colitis  - CRP QUANTITIVE (NON-CARDIAC); Future  - Sed Rate; Future    Per patient symptoms feel similar to prior UC flares aside from the body aches and fever symptoms.  Differential quite broad at this point, but considerations include but not limited to infectious diarrhea, UC flare, viral syndrome, cholelithiasis, cholecystitis, hepatitis, diverticulitis, pancreatitis.    Clinical suspicion for emergent intra-abdominal pathology low at this time based on patient appearance and  exam today.  Recommend labs to include inflammatory markers to further evaluate.    ESR and CRP within normal limits.  Unlikely that this is a UC flare.  CBC, CMP within normal limits.  Lipase is mildly elevated at 137.  Fortunately AST, ALT, alk phos are within normal limits-unlikely that symptoms are secondary to choledocholithiasis or other emergent biliary pathology.  Cause of elevation is unclear.  Discussed CT to further evaluate versus trending lipase over the next 48 hours.  Through shared decision making patient decided to trend lipase.  Patient was given strict return ED precautions.  Stool cultures pending at this time.      My total time spent caring for the patient on the day of the encounter was 43 minutes.   This does not include time spent on separately billable procedures/tests.    -------    10/9/2022  Stool cultures negative.  Patient's lipase has normalized.  Patient was contacted to discuss symptoms.  He states that he is feeling moderately improved but does experience exacerbations of his symptoms when he eats foods that are not prescribed by the brat diet.  We will have patient follow-up with either GI or his PCP next week for reevaluation and further management.  Return precautions reviewed.    Differential diagnosis, natural history, supportive care, and indications for immediate follow-up discussed.

## 2022-10-05 NOTE — LETTER
October 5, 2022    To Whom It May Concern:         This is confirmation that Ghassan Salas Van attended his scheduled appointment with Bhaskar Anthony P.A.-C. on 10/05/22. Please excuse him from work on 10/5-10/8/22.         If you have any questions please do not hesitate to call me at the phone number listed below.    Sincerely,          Bhaskar Anthony P.A.-C.  [phone]

## 2022-10-06 ENCOUNTER — HOSPITAL ENCOUNTER (OUTPATIENT)
Dept: LAB | Facility: MEDICAL CENTER | Age: 32
End: 2022-10-06
Attending: PHYSICIAN ASSISTANT
Payer: COMMERCIAL

## 2022-10-06 DIAGNOSIS — R74.8 ELEVATED LIPASE: ICD-10-CM

## 2022-10-06 DIAGNOSIS — R10.13 EPIGASTRIC ABDOMINAL PAIN: ICD-10-CM

## 2022-10-06 LAB
E COLI SXT1+2 STL IA: NORMAL
LIPASE SERPL-CCNC: 27 U/L (ref 11–82)
SIGNIFICANT IND 70042: NORMAL
SITE SITE: NORMAL
SOURCE SOURCE: NORMAL

## 2022-10-06 PROCEDURE — 36415 COLL VENOUS BLD VENIPUNCTURE: CPT

## 2022-10-06 PROCEDURE — 83690 ASSAY OF LIPASE: CPT

## 2022-10-07 ENCOUNTER — HOSPITAL ENCOUNTER (OUTPATIENT)
Dept: LAB | Facility: MEDICAL CENTER | Age: 32
End: 2022-10-07
Attending: PHYSICIAN ASSISTANT
Payer: COMMERCIAL

## 2022-10-07 DIAGNOSIS — R10.13 EPIGASTRIC ABDOMINAL PAIN: ICD-10-CM

## 2022-10-07 DIAGNOSIS — R74.8 ELEVATED LIPASE: ICD-10-CM

## 2022-10-07 LAB
BACTERIA STL CULT: NORMAL
C JEJUNI+C COLI AG STL QL: NORMAL
E COLI SXT1+2 STL IA: NORMAL
LIPASE SERPL-CCNC: 25 U/L (ref 11–82)
SIGNIFICANT IND 70042: NORMAL
SITE SITE: NORMAL
SOURCE SOURCE: NORMAL

## 2022-10-07 PROCEDURE — 83690 ASSAY OF LIPASE: CPT

## 2022-10-07 PROCEDURE — 36415 COLL VENOUS BLD VENIPUNCTURE: CPT

## 2022-11-29 DIAGNOSIS — F98.8 ATTENTION DEFICIT DISORDER, UNSPECIFIED HYPERACTIVITY PRESENCE: ICD-10-CM

## 2022-11-30 RX ORDER — DEXTROAMPHETAMINE SACCHARATE, AMPHETAMINE ASPARTATE MONOHYDRATE, DEXTROAMPHETAMINE SULFATE AND AMPHETAMINE SULFATE 5; 5; 5; 5 MG/1; MG/1; MG/1; MG/1
20 CAPSULE, EXTENDED RELEASE ORAL EVERY MORNING
Qty: 30 CAPSULE | Refills: 0 | Status: SHIPPED | OUTPATIENT
Start: 2022-11-30 | End: 2023-01-03 | Stop reason: SDUPTHER

## 2023-01-03 DIAGNOSIS — F98.8 ATTENTION DEFICIT DISORDER, UNSPECIFIED HYPERACTIVITY PRESENCE: ICD-10-CM

## 2023-01-04 NOTE — TELEPHONE ENCOUNTER
Received request via: Patient    Was the patient seen in the last year in this department? Yes    Does the patient have an active prescription (recently filled or refills available) for medication(s) requested? No    Does the patient have group home Plus and need 100 day supply (blood pressure, diabetes and cholesterol meds only)? Patient does not have SCP

## 2023-01-06 RX ORDER — DEXTROAMPHETAMINE SACCHARATE, AMPHETAMINE ASPARTATE MONOHYDRATE, DEXTROAMPHETAMINE SULFATE AND AMPHETAMINE SULFATE 5; 5; 5; 5 MG/1; MG/1; MG/1; MG/1
20 CAPSULE, EXTENDED RELEASE ORAL EVERY MORNING
Qty: 30 CAPSULE | Refills: 0 | Status: SHIPPED
Start: 2023-01-06 | End: 2023-01-16

## 2023-01-09 ENCOUNTER — TELEPHONE (OUTPATIENT)
Dept: MEDICAL GROUP | Facility: PHYSICIAN GROUP | Age: 33
End: 2023-01-09
Payer: COMMERCIAL

## 2023-01-10 NOTE — TELEPHONE ENCOUNTER
DOCUMENTATION OF PAR STATUS:    1. Name of Medication & Dose: Amphetamine-dextroamphetamine 20mg cap     2. Name of Prescription Coverage Company & phone #: [employer]    3. Date Prior Auth Submitted: 1/9/23    4. What information was given to obtain insurance decision? Cover My Meds    5. Prior Auth Status? Pending    6. Patient Notified: yes

## 2023-01-10 NOTE — TELEPHONE ENCOUNTER
FINAL PRIOR AUTHORIZATION STATUS:    1.  Name of Medication & Dose: amphetamine-dextroamphetamine     2. Prior Auth Status: Approved through 1/9/24     3. Action Taken: Pharmacy Notified: yes Patient Notified: yes

## 2023-01-16 ENCOUNTER — OFFICE VISIT (OUTPATIENT)
Dept: MEDICAL GROUP | Facility: PHYSICIAN GROUP | Age: 33
End: 2023-01-16
Payer: COMMERCIAL

## 2023-01-16 VITALS
SYSTOLIC BLOOD PRESSURE: 120 MMHG | HEART RATE: 100 BPM | DIASTOLIC BLOOD PRESSURE: 62 MMHG | OXYGEN SATURATION: 98 % | TEMPERATURE: 99 F | HEIGHT: 70 IN | RESPIRATION RATE: 18 BRPM | BODY MASS INDEX: 33.79 KG/M2 | WEIGHT: 236 LBS

## 2023-01-16 DIAGNOSIS — K51.90 ULCERATIVE COLITIS WITHOUT COMPLICATIONS, UNSPECIFIED LOCATION (HCC): ICD-10-CM

## 2023-01-16 DIAGNOSIS — F90.0 ATTENTION DEFICIT HYPERACTIVITY DISORDER (ADHD), PREDOMINANTLY INATTENTIVE TYPE: Chronic | ICD-10-CM

## 2023-01-16 DIAGNOSIS — Z00.00 ANNUAL PHYSICAL EXAM: ICD-10-CM

## 2023-01-16 DIAGNOSIS — Z23 NEED FOR VACCINATION: ICD-10-CM

## 2023-01-16 DIAGNOSIS — I47.10 PSVT (PAROXYSMAL SUPRAVENTRICULAR TACHYCARDIA) (HCC): ICD-10-CM

## 2023-01-16 DIAGNOSIS — F98.8 ATTENTION DEFICIT DISORDER, UNSPECIFIED HYPERACTIVITY PRESENCE: ICD-10-CM

## 2023-01-16 DIAGNOSIS — J45.20 MILD INTERMITTENT ASTHMA WITHOUT COMPLICATION: ICD-10-CM

## 2023-01-16 PROBLEM — U07.1 COVID-19: Status: RESOLVED | Noted: 2022-07-07 | Resolved: 2023-01-16

## 2023-01-16 PROCEDURE — 90677 PCV20 VACCINE IM: CPT | Performed by: INTERNAL MEDICINE

## 2023-01-16 PROCEDURE — 93000 ELECTROCARDIOGRAM COMPLETE: CPT | Performed by: INTERNAL MEDICINE

## 2023-01-16 PROCEDURE — 90471 IMMUNIZATION ADMIN: CPT | Performed by: INTERNAL MEDICINE

## 2023-01-16 PROCEDURE — 90472 IMMUNIZATION ADMIN EACH ADD: CPT | Performed by: INTERNAL MEDICINE

## 2023-01-16 PROCEDURE — 99395 PREV VISIT EST AGE 18-39: CPT | Mod: 25 | Performed by: INTERNAL MEDICINE

## 2023-01-16 PROCEDURE — 90686 IIV4 VACC NO PRSV 0.5 ML IM: CPT | Performed by: INTERNAL MEDICINE

## 2023-01-16 RX ORDER — FLUTICASONE PROPIONATE AND SALMETEROL XINAFOATE 230; 21 UG/1; UG/1
2 AEROSOL, METERED RESPIRATORY (INHALATION) DAILY
Qty: 1 EACH | Refills: 6 | Status: SHIPPED | OUTPATIENT
Start: 2023-01-16 | End: 2024-02-07 | Stop reason: SDUPTHER

## 2023-01-16 RX ORDER — FLUTICASONE PROPIONATE AND SALMETEROL XINAFOATE 230; 21 UG/1; UG/1
2 AEROSOL, METERED RESPIRATORY (INHALATION) DAILY
Qty: 1 EACH | Refills: 6 | Status: SHIPPED
Start: 2023-01-16 | End: 2023-01-16

## 2023-01-16 RX ORDER — DEXTROAMPHETAMINE SACCHARATE, AMPHETAMINE ASPARTATE MONOHYDRATE, DEXTROAMPHETAMINE SULFATE AND AMPHETAMINE SULFATE 5; 5; 5; 5 MG/1; MG/1; MG/1; MG/1
20 CAPSULE, EXTENDED RELEASE ORAL EVERY MORNING
Qty: 30 CAPSULE | Refills: 0 | Status: SHIPPED | OUTPATIENT
Start: 2023-02-06 | End: 2023-03-08 | Stop reason: SDUPTHER

## 2023-01-16 ASSESSMENT — PATIENT HEALTH QUESTIONNAIRE - PHQ9: CLINICAL INTERPRETATION OF PHQ2 SCORE: 0

## 2023-01-16 ASSESSMENT — FIBROSIS 4 INDEX: FIB4 SCORE: 0.34

## 2023-01-16 NOTE — PROGRESS NOTES
"PRIMARY CARE CLINIC VISIT    Chief complaint:    Pt is here for Annual Exam      History of Present Illness     ADHD  Chronic condition.  Patient presently taking Adderall 20 mg/day.  No significant side effects reported.  Patient reported improvement since taking this medication.  Patient signed the consent form today.    Ulcerative colitis (HCC)  Chronic condition diagnosed at the age of 21s.  Patient has not seen GI specialist for quite some time.  Patient denies fever chills nausea vomiting abdominal pain change in bowel movement or GI bleeding.  Referral was submitted to GI previously.  Contact information given to the patient today.    PSVT (paroxysmal supraventricular tachycardia) (HCC)  This is a chronic condition.  The patient status post ablation in 2009.  Patient has not seen cardiologist for several years.  Patient reported intermittent palpitation.  Currently he is asymptomatic.    Asthma  This is a chronic condition.  The patient uses Advair 2 puff daily.  He also uses albuterol as needed.  At the present time patient denies shortness of breath wheezing or significant cough.    Current Outpatient Medications on File Prior to Visit   Medication Sig Dispense Refill    albuterol 108 (90 Base) MCG/ACT Aero Soln inhalation aerosol Inhale 2 Puffs every 6 hours as needed for Shortness of Breath. 1 Each 2    PROBIOTIC PRODUCT PO Take  by mouth every day.       No current facility-administered medications on file prior to visit.        Allergies: Penicillins and Methotrexate    ROS  As per HPI above. All other systems reviewed and negative.      Past Medical, Social, and Family history reviewed and updated in EPIC     Objective     /62 (BP Location: Right arm, Patient Position: Sitting, BP Cuff Size: Adult)   Pulse 100   Temp 37.2 °C (99 °F) (Temporal)   Resp 18   Ht 1.778 m (5' 10\")   Wt 107 kg (236 lb)   SpO2 98%    Body mass index is 33.86 kg/m².    General: alert in no apparent " distress.  Cardiovascular: regular rate and rhythm  Pulmonary: lungs : no wheezing   Gastrointestinal: BS present. No obvious mass noted    EKG in the office today shows sinus rhythm.  Rate 90 bpm.  Normal EKG.  Result discussed with the patient.      Assessment and Plan     1. Annual physical exam  - HEMOGLOBIN A1C; Future  - Basic Metabolic Panel; Future  - Lipid Profile; Future  - TSH; Future  - VITAMIN D,25 HYDROXY (DEFICIENCY); Future  - CBC WITHOUT DIFFERENTIAL; Future  Routine lab work ordered.  Advised the patient to follow-up few days after lab test done    2. Need for vaccination  - Pneumococcal Conjugate Vaccine 20-Valent (19 yrs+)  - INFLUENZA VACCINE QUAD INJ (PF)    3. Attention deficit disorder, unspecified hyperactivity presence  Chronic condition.  Stable.  Continue with Adderall 20 mg daily.  Chronic stable condition.  Continue Adderall 20 mg daily.  Patient denies significant side effects.  - amphetamine-dextroamphetamine (ADDERALL XR, 20MG,) 20 MG per XR capsule; Take 1 Capsule by mouth every morning for 27 days.  Dispense: 30 Capsule; Refill: 0      4. PSVT (paroxysmal supraventricular tachycardia) (HCC)  This is a chronic condition.  The patient is status post ablation previously.  Patient reported intermittent palpitation.  EKG ordered today.  Refer the patient to cardiology for consultation.  - EKG  - REFERRAL TO CARDIOLOGY    5.Ulcerative colitis without complications, unspecified location (HCC)  Chronic condition.  Current status unclear  Patient was referred to GI previously.  Contact information of the specialist given to the patient today.      7. Mild intermittent asthma without complication  Is a chronic and stable condition.  Advised the patient continue with Advair twice daily.  Rinse mouth after use.  Patient may use albuterol as needed for rescue treatment.                  Anticipatory guidance    Patient Counseling:  -Cholesterol screening. Fasting lipid panel  -Diabetes  screening. Fasting blood sugar  -Diet: advised lean meats, fruits, vegetables, whole grains  -Discussed Healthful lifestyle measures. Pt to avoid tobacco, ETOH, and drug use.  -Encouraged regular exercise  -Advised sun protection, sunscreen use  -Injury prevention: discussed safety seat belts  -Discussed preventive immunizations                      Please note that this dictation was created using voice recognition software. I have made every reasonable attempt to correct obvious errors, but I expect that there are errors of grammar and possibly content that I did not discover before finalizing the note.    Tee Gonzales MD  Internal Medicine  [facility] care [facility]

## 2023-01-16 NOTE — ASSESSMENT & PLAN NOTE
Chronic condition diagnosed at the age of 21s.  Patient has not seen GI specialist for quite some time.  Patient denies fever chills nausea vomiting abdominal pain change in bowel movement or GI bleeding.  Referral was submitted to GI previously.  Contact information given to the patient today.

## 2023-01-16 NOTE — ASSESSMENT & PLAN NOTE
This is a chronic condition.  The patient status post ablation in 2009.  Patient has not seen cardiologist for several years.  Patient reported intermittent palpitation.  Currently he is asymptomatic.

## 2023-01-16 NOTE — ASSESSMENT & PLAN NOTE
Chronic condition.  Patient presently taking Adderall 20 mg/day.  No significant side effects reported.  Patient reported improvement since taking this medication.  Patient signed the consent form today.

## 2023-01-18 ENCOUNTER — TELEPHONE (OUTPATIENT)
Dept: HEALTH INFORMATION MANAGEMENT | Facility: OTHER | Age: 33
End: 2023-01-18
Payer: COMMERCIAL

## 2023-02-01 ENCOUNTER — HOSPITAL ENCOUNTER (OUTPATIENT)
Dept: LAB | Facility: MEDICAL CENTER | Age: 33
End: 2023-02-01
Attending: INTERNAL MEDICINE
Payer: COMMERCIAL

## 2023-02-01 DIAGNOSIS — Z00.00 ANNUAL PHYSICAL EXAM: ICD-10-CM

## 2023-02-01 LAB
25(OH)D3 SERPL-MCNC: 49 NG/ML (ref 30–100)
ANION GAP SERPL CALC-SCNC: 12 MMOL/L (ref 7–16)
BUN SERPL-MCNC: 13 MG/DL (ref 8–22)
CALCIUM SERPL-MCNC: 9.8 MG/DL (ref 8.5–10.5)
CHLORIDE SERPL-SCNC: 103 MMOL/L (ref 96–112)
CHOLEST SERPL-MCNC: 264 MG/DL (ref 100–199)
CO2 SERPL-SCNC: 24 MMOL/L (ref 20–33)
CREAT SERPL-MCNC: 0.81 MG/DL (ref 0.5–1.4)
ERYTHROCYTE [DISTWIDTH] IN BLOOD BY AUTOMATED COUNT: 37.2 FL (ref 35.9–50)
EST. AVERAGE GLUCOSE BLD GHB EST-MCNC: 97 MG/DL
FASTING STATUS PATIENT QL REPORTED: NORMAL
GFR SERPLBLD CREATININE-BSD FMLA CKD-EPI: 121 ML/MIN/1.73 M 2
GLUCOSE SERPL-MCNC: 95 MG/DL (ref 65–99)
HBA1C MFR BLD: 5 % (ref 4–5.6)
HCT VFR BLD AUTO: 46.6 % (ref 42–52)
HDLC SERPL-MCNC: 35 MG/DL
HGB BLD-MCNC: 16.4 G/DL (ref 14–18)
LDLC SERPL CALC-MCNC: ABNORMAL MG/DL
MCH RBC QN AUTO: 30.4 PG (ref 27–33)
MCHC RBC AUTO-ENTMCNC: 35.2 G/DL (ref 33.7–35.3)
MCV RBC AUTO: 86.3 FL (ref 81.4–97.8)
PLATELET # BLD AUTO: 351 K/UL (ref 164–446)
PMV BLD AUTO: 10.4 FL (ref 9–12.9)
POTASSIUM SERPL-SCNC: 4.4 MMOL/L (ref 3.6–5.5)
RBC # BLD AUTO: 5.4 M/UL (ref 4.7–6.1)
SODIUM SERPL-SCNC: 139 MMOL/L (ref 135–145)
TRIGL SERPL-MCNC: 421 MG/DL (ref 0–149)
TSH SERPL DL<=0.005 MIU/L-ACNC: 2.13 UIU/ML (ref 0.38–5.33)
WBC # BLD AUTO: 12.2 K/UL (ref 4.8–10.8)

## 2023-02-01 PROCEDURE — 82306 VITAMIN D 25 HYDROXY: CPT

## 2023-02-01 PROCEDURE — 85027 COMPLETE CBC AUTOMATED: CPT

## 2023-02-01 PROCEDURE — 84443 ASSAY THYROID STIM HORMONE: CPT

## 2023-02-01 PROCEDURE — 36415 COLL VENOUS BLD VENIPUNCTURE: CPT

## 2023-02-01 PROCEDURE — 80061 LIPID PANEL: CPT

## 2023-02-01 PROCEDURE — 80048 BASIC METABOLIC PNL TOTAL CA: CPT

## 2023-02-01 PROCEDURE — 83036 HEMOGLOBIN GLYCOSYLATED A1C: CPT

## 2023-02-02 DIAGNOSIS — E78.5 DYSLIPIDEMIA: ICD-10-CM

## 2023-02-02 DIAGNOSIS — D72.829 LEUKOCYTOSIS, UNSPECIFIED TYPE: ICD-10-CM

## 2023-02-13 ENCOUNTER — TELEPHONE (OUTPATIENT)
Dept: CARDIOLOGY | Facility: MEDICAL CENTER | Age: 33
End: 2023-02-13
Payer: COMMERCIAL

## 2023-02-13 NOTE — TELEPHONE ENCOUNTER
Spoke to patient in regards to obtaining records for NP appointment with Dr. Bang. Confirmed all recent notes, labs, and cardiac imaging are in Epic. Confirmed with patient appointment time, location, and date.    Patient will be bringing records with him the day of appointment.

## 2023-02-14 ENCOUNTER — APPOINTMENT (OUTPATIENT)
Dept: RADIOLOGY | Facility: MEDICAL CENTER | Age: 33
End: 2023-02-14
Attending: INTERNAL MEDICINE
Payer: COMMERCIAL

## 2023-02-21 ENCOUNTER — HOSPITAL ENCOUNTER (OUTPATIENT)
Dept: RADIOLOGY | Facility: MEDICAL CENTER | Age: 33
End: 2023-02-21
Attending: INTERNAL MEDICINE
Payer: COMMERCIAL

## 2023-02-21 DIAGNOSIS — D72.829 LEUKOCYTOSIS, UNSPECIFIED TYPE: ICD-10-CM

## 2023-02-21 PROCEDURE — 71046 X-RAY EXAM CHEST 2 VIEWS: CPT

## 2023-02-28 ENCOUNTER — TELEPHONE (OUTPATIENT)
Dept: CARDIOLOGY | Facility: MEDICAL CENTER | Age: 33
End: 2023-02-28

## 2023-02-28 ENCOUNTER — OFFICE VISIT (OUTPATIENT)
Dept: CARDIOLOGY | Facility: MEDICAL CENTER | Age: 33
End: 2023-02-28
Attending: INTERNAL MEDICINE
Payer: COMMERCIAL

## 2023-02-28 VITALS
BODY MASS INDEX: 32.5 KG/M2 | HEART RATE: 103 BPM | WEIGHT: 227 LBS | DIASTOLIC BLOOD PRESSURE: 74 MMHG | RESPIRATION RATE: 12 BRPM | OXYGEN SATURATION: 97 % | HEIGHT: 70 IN | SYSTOLIC BLOOD PRESSURE: 124 MMHG

## 2023-02-28 DIAGNOSIS — I47.10 PSVT (PAROXYSMAL SUPRAVENTRICULAR TACHYCARDIA) (HCC): Chronic | ICD-10-CM

## 2023-02-28 DIAGNOSIS — I20.89 ANGINA AT REST (HCC): ICD-10-CM

## 2023-02-28 DIAGNOSIS — E78.5 HYPERLIPIDEMIA, UNSPECIFIED HYPERLIPIDEMIA TYPE: ICD-10-CM

## 2023-02-28 PROCEDURE — 99204 OFFICE O/P NEW MOD 45 MIN: CPT | Mod: 25 | Performed by: INTERNAL MEDICINE

## 2023-02-28 PROCEDURE — 93000 ELECTROCARDIOGRAM COMPLETE: CPT | Performed by: INTERNAL MEDICINE

## 2023-02-28 ASSESSMENT — FIBROSIS 4 INDEX: FIB4 SCORE: 0.28

## 2023-02-28 NOTE — TELEPHONE ENCOUNTER
Requested records from [facility].   No patient records since Patient was seen more than 10 years ago

## 2023-02-28 NOTE — PROGRESS NOTES
Arrhythmia Clinic Note (New patient)     DOS: 2023    Referring physician: Dr Gonzales    Chief complaint/Reason for consult: Chest pain, palpitations    HPI: 31 y/o M with h/o SVT ablation in  presenting for evaluation of chest pain and palpitations. Chest pain is central and not exertional. Palpitations are infrequent but feel similar to prior SVT however not long lasting. No syncope.     ROS (+ highlighted in bold):  Constitutional: Fevers/chills/fatigue/weightloss  HEENT: Blurry vision/eye pain/sore throat/hearing loss  Respiratory: Shortness of breath/cough  Cardiovascular: Chest pain/palpitations/edema/orthopnea/syncope  GI: Nausea/vomitting/diarrhea  MSK: Arthralgias/myagias/muscle weakness  Skin: Rash/sores  Neurological: Numbness/tremors/vertigo  Endocrine: Excessive thirst/polyuria/cold intolerance/heat intolerance  Psych: Depression/anxiety    History reviewed. No pertinent past medical history.    Past Surgical History:   Procedure Laterality Date    HAND SURGERY Right        Social History     Socioeconomic History    Marital status: Single     Spouse name: Not on file    Number of children: Not on file    Years of education: Not on file    Highest education level: Not on file   Occupational History    Not on file   Tobacco Use    Smoking status: Former     Types: Cigarettes     Quit date: 10/4/2021     Years since quittin.4    Smokeless tobacco: Current     Types: Snuff, Chew   Vaping Use    Vaping Use: Former   Substance and Sexual Activity    Alcohol use: Yes     Comment: occ    Drug use: Yes     Types: Marijuana    Sexual activity: Not on file   Other Topics Concern    Not on file   Social History Narrative    Not on file     Social Determinants of Health     Financial Resource Strain: Not on file   Food Insecurity: Not on file   Transportation Needs: Not on file   Physical Activity: Not on file   Stress: Not on file   Social Connections: Not on file   Intimate Partner Violence: Not  "on file   Housing Stability: Not on file       Family History   Problem Relation Age of Onset    Cancer Maternal Grandmother         uterine    Heart Disease Maternal Grandfather     Heart Disease Paternal Grandmother     Cancer Paternal Grandfather         Lymphoma       Allergies   Allergen Reactions    Penicillins Anaphylaxis    Methotrexate Rash       Current Outpatient Medications   Medication Sig Dispense Refill    Ascorbic Acid (VITAMIN C PO)       MAGNESIUM PO       Cholecalciferol (VITAMIN D3 PO)       amphetamine-dextroamphetamine (ADDERALL XR, 20MG,) 20 MG per XR capsule Take 1 Capsule by mouth every morning for 27 days. 30 Capsule 0    fluticasone-salmeterol (ADVAIR HFA) 230-21 MCG/ACT inhaler Inhale 2 Puffs every day. 1 Each 6    albuterol 108 (90 Base) MCG/ACT Aero Soln inhalation aerosol Inhale 2 Puffs every 6 hours as needed for Shortness of Breath. 1 Each 2    PROBIOTIC PRODUCT PO Take  by mouth every day.       No current facility-administered medications for this visit.       Physical Exam:  Vitals:    02/28/23 1321   BP: 124/74   BP Location: Left arm   Patient Position: Sitting   BP Cuff Size: Adult   Pulse: (!) 103   Resp: 12   SpO2: 97%   Weight: 103 kg (227 lb)   Height: 1.778 m (5' 10\")     General appearance: NAD, conversant   Eyes: anicteric sclerae, moist conjunctivae; no lid-lag; PERRLA  HENT: Atraumatic; oropharynx clear with moist mucous membranes and no mucosal ulcerations; normal hard and soft palate  Neck: Trachea midline; FROM, supple, no thyromegaly or lymphadenopathy  Lungs: CTA, with normal respiratory effort and no intercostal retractions  CV: RRR, no MRGs, no JVD   Abdomen: Soft, non-tender; no masses or HSM  Extremities: No peripheral edema or extremity lymphadenopathy  Skin: Normal temperature, turgor and texture; no rash, ulcers or subcutaneous nodules  Psych: Appropriate affect, alert and oriented to person, place and time    Data:  Lipids:   Lab Results   Component Value " Date/Time    CHOLSTRLTOT 264 (H) 02/01/2023 08:58 AM    TRIGLYCERIDE 421 (H) 02/01/2023 08:58 AM    HDL 35 (A) 02/01/2023 08:58 AM    LDL see below 02/01/2023 08:58 AM        BMP:  Lab Results   Component Value Date/Time    SODIUM 139 02/01/2023 0858    POTASSIUM 4.4 02/01/2023 0858    CHLORIDE 103 02/01/2023 0858    CO2 24 02/01/2023 0858    GLUCOSE 95 02/01/2023 0858    BUN 13 02/01/2023 0858    CREATININE 0.81 02/01/2023 0858    CALCIUM 9.8 02/01/2023 0858    ANION 12.0 02/01/2023 0858        TSH:   Lab Results   Component Value Date/Time    TSHULTRASEN 2.130 02/01/2023 0858        THYROXINE (T4):   No results found for: FREEDIR     CBC:   Lab Results   Component Value Date/Time    WBC 12.2 (H) 02/01/2023 08:58 AM    RBC 5.40 02/01/2023 08:58 AM    HEMOGLOBIN 16.4 02/01/2023 08:58 AM    HEMATOCRIT 46.6 02/01/2023 08:58 AM    MCV 86.3 02/01/2023 08:58 AM    MCH 30.4 02/01/2023 08:58 AM    MCHC 35.2 02/01/2023 08:58 AM    RDW 37.2 02/01/2023 08:58 AM    PLATELETCT 351 02/01/2023 08:58 AM    MPV 10.4 02/01/2023 08:58 AM    NEUTSPOLYS 60.40 05/14/2022 09:14 PM    LYMPHOCYTES 32.60 05/14/2022 09:14 PM    MONOCYTES 5.60 05/14/2022 09:14 PM    EOSINOPHILS 0.80 05/14/2022 09:14 PM    BASOPHILS 0.30 05/14/2022 09:14 PM    IMMGRAN 0.30 05/14/2022 09:14 PM    NRBC 0.00 05/14/2022 09:14 PM    NEUTS 6.28 05/14/2022 09:14 PM    LYMPHS 3.38 05/14/2022 09:14 PM    MONOS 0.58 05/14/2022 09:14 PM    EOS 0.08 05/14/2022 09:14 PM    BASO 0.03 05/14/2022 09:14 PM    IMMGRANAB 0.03 05/14/2022 09:14 PM    NRBCAB 0.00 05/14/2022 09:14 PM        CBC w/o DIFF  Lab Results   Component Value Date/Time    WBC 12.2 (H) 02/01/2023 08:58 AM    RBC 5.40 02/01/2023 08:58 AM    HEMOGLOBIN 16.4 02/01/2023 08:58 AM    MCV 86.3 02/01/2023 08:58 AM    MCH 30.4 02/01/2023 08:58 AM    MCHC 35.2 02/01/2023 08:58 AM    RDW 37.2 02/01/2023 08:58 AM    MPV 10.4 02/01/2023 08:58 AM         EKG interpreted by me: NSR    Impression/Plan:  1. PSVT (paroxysmal  supraventricular tachycardia) (HCC)  EKG      2. Angina at rest (HCC)  EC-ECHOCARDIOGRAM COMPLETE W/O CONT    NM-CARDIAC TREADMILL ONLY-NO IMAGING    Cardiac Event Monitor      3. Hyperlipidemia, unspecified hyperlipidemia type  Referral to Vascular Medicine        SVT s/p ablation  Chest pain  HLD    - Check echo, stress, for chest pain and palpitations  - Check event monitor to assess for recurrent SVT  - Referral to vascular medicine for HLD management    EP will review above testing, if normal will discharge from EP clinic    Sean Bang MD  Cardiac Electrophysiology

## 2023-03-02 ENCOUNTER — NON-PROVIDER VISIT (OUTPATIENT)
Dept: CARDIOLOGY | Facility: MEDICAL CENTER | Age: 33
End: 2023-03-02
Attending: INTERNAL MEDICINE
Payer: COMMERCIAL

## 2023-03-02 DIAGNOSIS — I20.89 ANGINA AT REST (HCC): ICD-10-CM

## 2023-03-02 PROCEDURE — 99999 PR NO CHARGE: CPT | Performed by: INTERNAL MEDICINE

## 2023-03-02 NOTE — PROGRESS NOTES
Monitor fell off, rehook with office hook up 03/20/23, closing this encounter no charge.        Home enrollment completed in the 14 day Zio XT Holter monitoring program, per Sean Bang M.D.  Monitor will be shipped to patient via [employer].  >Pending EOS.

## 2023-03-08 DIAGNOSIS — F98.8 ATTENTION DEFICIT DISORDER, UNSPECIFIED HYPERACTIVITY PRESENCE: ICD-10-CM

## 2023-03-08 RX ORDER — DEXTROAMPHETAMINE SACCHARATE, AMPHETAMINE ASPARTATE MONOHYDRATE, DEXTROAMPHETAMINE SULFATE AND AMPHETAMINE SULFATE 5; 5; 5; 5 MG/1; MG/1; MG/1; MG/1
20 CAPSULE, EXTENDED RELEASE ORAL EVERY MORNING
Qty: 30 CAPSULE | Refills: 0 | Status: SHIPPED | OUTPATIENT
Start: 2023-03-08 | End: 2023-04-19 | Stop reason: SDUPTHER

## 2023-03-11 LAB — EKG IMPRESSION: NORMAL

## 2023-03-14 ENCOUNTER — APPOINTMENT (OUTPATIENT)
Dept: CARDIOLOGY | Facility: MEDICAL CENTER | Age: 33
End: 2023-03-14
Payer: COMMERCIAL

## 2023-03-20 ENCOUNTER — NON-PROVIDER VISIT (OUTPATIENT)
Dept: CARDIOLOGY | Facility: MEDICAL CENTER | Age: 33
End: 2023-03-20
Payer: COMMERCIAL

## 2023-03-20 DIAGNOSIS — I20.89 ANGINA AT REST (HCC): ICD-10-CM

## 2023-03-20 DIAGNOSIS — I47.10 SVT (SUPRAVENTRICULAR TACHYCARDIA) (HCC): ICD-10-CM

## 2023-03-20 DIAGNOSIS — I49.1 APC (ATRIAL PREMATURE CONTRACTIONS): ICD-10-CM

## 2023-03-20 DIAGNOSIS — I47.29 NSVT (NONSUSTAINED VENTRICULAR TACHYCARDIA) (HCC): ICD-10-CM

## 2023-03-20 DIAGNOSIS — I49.3 PVC'S (PREMATURE VENTRICULAR CONTRACTIONS): ICD-10-CM

## 2023-03-20 NOTE — PROGRESS NOTES
Home enrollment Zio fell off next day, pt re hooked with ePatch. Taking to fedex soon 04/12/23    Patient enrolled in the 14 day ePatch Holter monitoring program, per Sean Bang M.D. In Clinic hook up, monitor S/N [id number]. Pending EOS..

## 2023-03-30 ENCOUNTER — HOSPITAL ENCOUNTER (OUTPATIENT)
Dept: RADIOLOGY | Facility: MEDICAL CENTER | Age: 33
End: 2023-03-30
Attending: INTERNAL MEDICINE
Payer: COMMERCIAL

## 2023-03-30 DIAGNOSIS — I20.89 ANGINA AT REST (HCC): ICD-10-CM

## 2023-03-30 PROCEDURE — 93017 CV STRESS TEST TRACING ONLY: CPT

## 2023-04-01 PROCEDURE — 93018 CV STRESS TEST I&R ONLY: CPT | Performed by: INTERNAL MEDICINE

## 2023-04-03 ENCOUNTER — TELEPHONE (OUTPATIENT)
Dept: CARDIOLOGY | Facility: MEDICAL CENTER | Age: 33
End: 2023-04-03
Payer: COMMERCIAL

## 2023-04-06 PROCEDURE — 93228 REMOTE 30 DAY ECG REV/REPORT: CPT | Performed by: INTERNAL MEDICINE

## 2023-04-17 ENCOUNTER — TELEPHONE (OUTPATIENT)
Dept: CARDIOLOGY | Facility: MEDICAL CENTER | Age: 33
End: 2023-04-17

## 2023-04-17 NOTE — TELEPHONE ENCOUNTER
ANYA    Caller: Carlos    Date and time of urgent report: 03-21 @  5:21am    Reference Number: None given. She said they had sent the report yesterday and wanted to make sure we got it.    Callback Number: [phone]    Thank you,  -Marcela LAURA

## 2023-04-19 DIAGNOSIS — F98.8 ATTENTION DEFICIT DISORDER, UNSPECIFIED HYPERACTIVITY PRESENCE: ICD-10-CM

## 2023-04-19 RX ORDER — DEXTROAMPHETAMINE SACCHARATE, AMPHETAMINE ASPARTATE MONOHYDRATE, DEXTROAMPHETAMINE SULFATE AND AMPHETAMINE SULFATE 5; 5; 5; 5 MG/1; MG/1; MG/1; MG/1
20 CAPSULE, EXTENDED RELEASE ORAL EVERY MORNING
Qty: 30 CAPSULE | Refills: 0 | Status: SHIPPED | OUTPATIENT
Start: 2023-04-19 | End: 2023-05-20 | Stop reason: SDUPTHER

## 2023-05-22 ENCOUNTER — TELEPHONE (OUTPATIENT)
Dept: MEDICAL GROUP | Facility: PHYSICIAN GROUP | Age: 33
End: 2023-05-22
Payer: COMMERCIAL

## 2023-05-22 DIAGNOSIS — F98.8 ATTENTION DEFICIT DISORDER, UNSPECIFIED HYPERACTIVITY PRESENCE: ICD-10-CM

## 2023-05-22 RX ORDER — DEXTROAMPHETAMINE SACCHARATE, AMPHETAMINE ASPARTATE MONOHYDRATE, DEXTROAMPHETAMINE SULFATE AND AMPHETAMINE SULFATE 5; 5; 5; 5 MG/1; MG/1; MG/1; MG/1
20 CAPSULE, EXTENDED RELEASE ORAL EVERY MORNING
Qty: 30 CAPSULE | Refills: 0 | Status: SHIPPED | OUTPATIENT
Start: 2023-05-22 | End: 2023-06-20 | Stop reason: SDUPTHER

## 2023-05-22 NOTE — TELEPHONE ENCOUNTER
amphetamine-dextroamphetamine (ADDERALL XR, 20MG,) 20 MG per XR capsule [[id number]]     Order Details  Dose: 20 mg Route: Oral Frequency: EVERY MORNING   Dispense Quantity: 30 Capsule Refills: 0    Note to Pharmacy: Only 1 refill authorized. Patient to make appt prior to more refills.         Sig: Take 1 Capsule by mouth every morning for 27 days.         Daya would like to know if the Rx is for 27 days or 30 if so can you please fix it and send in a new Rx for 30 days.     Can you please advise.    Thank you,  Jessica Coughlin  Medical Assistant

## 2023-06-20 DIAGNOSIS — F98.8 ATTENTION DEFICIT DISORDER, UNSPECIFIED HYPERACTIVITY PRESENCE: ICD-10-CM

## 2023-06-20 RX ORDER — DEXTROAMPHETAMINE SACCHARATE, AMPHETAMINE ASPARTATE MONOHYDRATE, DEXTROAMPHETAMINE SULFATE AND AMPHETAMINE SULFATE 5; 5; 5; 5 MG/1; MG/1; MG/1; MG/1
20 CAPSULE, EXTENDED RELEASE ORAL EVERY MORNING
Qty: 30 CAPSULE | Refills: 0 | Status: SHIPPED | OUTPATIENT
Start: 2023-06-20 | End: 2023-06-27 | Stop reason: SDUPTHER

## 2023-06-26 ENCOUNTER — HOSPITAL ENCOUNTER (OUTPATIENT)
Dept: CARDIOLOGY | Facility: MEDICAL CENTER | Age: 33
End: 2023-06-26
Attending: INTERNAL MEDICINE
Payer: COMMERCIAL

## 2023-06-26 DIAGNOSIS — I20.89 ANGINA AT REST (HCC): ICD-10-CM

## 2023-06-26 PROCEDURE — 93306 TTE W/DOPPLER COMPLETE: CPT

## 2023-06-27 ENCOUNTER — TELEMEDICINE (OUTPATIENT)
Dept: MEDICAL GROUP | Facility: PHYSICIAN GROUP | Age: 33
End: 2023-06-27
Payer: COMMERCIAL

## 2023-06-27 ENCOUNTER — HOSPITAL ENCOUNTER (OUTPATIENT)
Dept: LAB | Facility: MEDICAL CENTER | Age: 33
End: 2023-06-27
Attending: INTERNAL MEDICINE
Payer: COMMERCIAL

## 2023-06-27 VITALS — RESPIRATION RATE: 20 BRPM | WEIGHT: 235 LBS | HEIGHT: 69 IN | BODY MASS INDEX: 34.8 KG/M2

## 2023-06-27 DIAGNOSIS — K51.90 ULCERATIVE COLITIS WITHOUT COMPLICATIONS, UNSPECIFIED LOCATION (HCC): Chronic | ICD-10-CM

## 2023-06-27 DIAGNOSIS — F90.0 ATTENTION DEFICIT HYPERACTIVITY DISORDER (ADHD), PREDOMINANTLY INATTENTIVE TYPE: ICD-10-CM

## 2023-06-27 DIAGNOSIS — D72.829 LEUKOCYTOSIS, UNSPECIFIED TYPE: Chronic | ICD-10-CM

## 2023-06-27 DIAGNOSIS — J45.20 MILD INTERMITTENT ASTHMA WITHOUT COMPLICATION: Chronic | ICD-10-CM

## 2023-06-27 DIAGNOSIS — E78.5 DYSLIPIDEMIA: Chronic | ICD-10-CM

## 2023-06-27 DIAGNOSIS — D72.829 LEUKOCYTOSIS, UNSPECIFIED TYPE: ICD-10-CM

## 2023-06-27 DIAGNOSIS — I47.10 PSVT (PAROXYSMAL SUPRAVENTRICULAR TACHYCARDIA) (HCC): Chronic | ICD-10-CM

## 2023-06-27 PROBLEM — J45.909 ASTHMA: Chronic | Status: ACTIVE | Noted: 2018-02-27

## 2023-06-27 LAB
BASOPHILS # BLD AUTO: 0.5 % (ref 0–1.8)
BASOPHILS # BLD: 0.04 K/UL (ref 0–0.12)
EOSINOPHIL # BLD AUTO: 0.13 K/UL (ref 0–0.51)
EOSINOPHIL NFR BLD: 1.6 % (ref 0–6.9)
ERYTHROCYTE [DISTWIDTH] IN BLOOD BY AUTOMATED COUNT: 38 FL (ref 35.9–50)
HCT VFR BLD AUTO: 43.8 % (ref 42–52)
HGB BLD-MCNC: 15.6 G/DL (ref 14–18)
IMM GRANULOCYTES # BLD AUTO: 0.03 K/UL (ref 0–0.11)
IMM GRANULOCYTES NFR BLD AUTO: 0.4 % (ref 0–0.9)
LV EJECT FRACT  99904: 60
LV EJECT FRACT MOD 2C 99903: 60.25
LV EJECT FRACT MOD 4C 99902: 68.03
LV EJECT FRACT MOD BP 99901: 64.18
LYMPHOCYTES # BLD AUTO: 2.51 K/UL (ref 1–4.8)
LYMPHOCYTES NFR BLD: 31.3 % (ref 22–41)
MCH RBC QN AUTO: 30.7 PG (ref 27–33)
MCHC RBC AUTO-ENTMCNC: 35.6 G/DL (ref 32.3–36.5)
MCV RBC AUTO: 86.2 FL (ref 81.4–97.8)
MONOCYTES # BLD AUTO: 0.54 K/UL (ref 0–0.85)
MONOCYTES NFR BLD AUTO: 6.7 % (ref 0–13.4)
NEUTROPHILS # BLD AUTO: 4.76 K/UL (ref 1.82–7.42)
NEUTROPHILS NFR BLD: 59.5 % (ref 44–72)
NRBC # BLD AUTO: 0 K/UL
NRBC BLD-RTO: 0 /100 WBC (ref 0–0.2)
PLATELET # BLD AUTO: 299 K/UL (ref 164–446)
PMV BLD AUTO: 10.4 FL (ref 9–12.9)
RBC # BLD AUTO: 5.08 M/UL (ref 4.7–6.1)
WBC # BLD AUTO: 8 K/UL (ref 4.8–10.8)

## 2023-06-27 PROCEDURE — 87086 URINE CULTURE/COLONY COUNT: CPT

## 2023-06-27 PROCEDURE — 36415 COLL VENOUS BLD VENIPUNCTURE: CPT

## 2023-06-27 PROCEDURE — 93306 TTE W/DOPPLER COMPLETE: CPT | Mod: 26 | Performed by: INTERNAL MEDICINE

## 2023-06-27 PROCEDURE — 85025 COMPLETE CBC W/AUTO DIFF WBC: CPT

## 2023-06-27 PROCEDURE — 99214 OFFICE O/P EST MOD 30 MIN: CPT | Mod: 95 | Performed by: INTERNAL MEDICINE

## 2023-06-27 RX ORDER — ROSUVASTATIN CALCIUM 20 MG/1
20 TABLET, COATED ORAL EVERY EVENING
Qty: 90 TABLET | Refills: 3 | Status: SHIPPED | OUTPATIENT
Start: 2023-06-27

## 2023-06-27 RX ORDER — DEXTROAMPHETAMINE SACCHARATE, AMPHETAMINE ASPARTATE MONOHYDRATE, DEXTROAMPHETAMINE SULFATE AND AMPHETAMINE SULFATE 5; 5; 5; 5 MG/1; MG/1; MG/1; MG/1
20 CAPSULE, EXTENDED RELEASE ORAL EVERY MORNING
Qty: 30 CAPSULE | Refills: 0 | Status: SHIPPED | OUTPATIENT
Start: 2023-06-27 | End: 2023-07-26 | Stop reason: SDUPTHER

## 2023-06-27 ASSESSMENT — FIBROSIS 4 INDEX: FIB4 SCORE: 0.28

## 2023-06-27 NOTE — ASSESSMENT & PLAN NOTE
This is a chronic condition.  The patient followed by cardiology service.  Patient is status post ablation

## 2023-06-27 NOTE — ASSESSMENT & PLAN NOTE
This is a new condition.  Previous lab test in February showed patient with mildly elevated white blood count.  Patient was requested to repeat the CBC however this has not been done.  Today I have reminded the patient to go to the lab ASAP.  Patient denies fever chills or any other symptoms.

## 2023-06-27 NOTE — ASSESSMENT & PLAN NOTE
This is a chronic condition.  The patient has been taking Adderall 20 Mg daily.  The patient tolerating medication well.  His symptoms are well controlled.

## 2023-06-27 NOTE — ASSESSMENT & PLAN NOTE
Chronic condition.  The patient followed by GI specialist.  Patient denies nausea vomiting abdominal pain or GI bleeding.

## 2023-06-27 NOTE — ASSESSMENT & PLAN NOTE
This is a chronic condition.  Lipid panel result discussed with the patient.  Patient was seen by cardiologist recently and patient was referred to vascular medicine specialist.  However the patient has not scheduled the appointment.  Patient was reminded to do that as soon as possible.

## 2023-06-27 NOTE — ASSESSMENT & PLAN NOTE
This is a chronic and stable condition.  The patient uses albuterol as needed.  Presently the patient denies shortness of breath wheezing or significant cough.

## 2023-06-29 LAB
BACTERIA UR CULT: NORMAL
SIGNIFICANT IND 70042: NORMAL
SITE SITE: NORMAL
SOURCE SOURCE: NORMAL

## 2023-07-11 ENCOUNTER — HOSPITAL ENCOUNTER (OUTPATIENT)
Facility: MEDICAL CENTER | Age: 33
End: 2023-07-11
Attending: INTERNAL MEDICINE
Payer: COMMERCIAL

## 2023-07-11 DIAGNOSIS — D72.829 LEUKOCYTOSIS, UNSPECIFIED TYPE: ICD-10-CM

## 2023-07-11 PROCEDURE — 87205 SMEAR GRAM STAIN: CPT

## 2023-07-12 LAB
GRAM STN SPEC: NORMAL
SIGNIFICANT IND 70042: NORMAL
SITE SITE: NORMAL
SOURCE SOURCE: NORMAL

## 2023-07-21 ENCOUNTER — TELEPHONE (OUTPATIENT)
Dept: VASCULAR LAB | Facility: MEDICAL CENTER | Age: 33
End: 2023-07-21
Payer: COMMERCIAL

## 2023-07-21 NOTE — TELEPHONE ENCOUNTER
Pt on wait list     VM Full / schedule sooner appt     Dates available :     Monday 8/14 Monday 8/21 - PM only   Mon 8/28 Monday 9/11

## 2023-07-26 DIAGNOSIS — F90.0 ATTENTION DEFICIT HYPERACTIVITY DISORDER (ADHD), PREDOMINANTLY INATTENTIVE TYPE: ICD-10-CM

## 2023-07-27 RX ORDER — DEXTROAMPHETAMINE SACCHARATE, AMPHETAMINE ASPARTATE MONOHYDRATE, DEXTROAMPHETAMINE SULFATE AND AMPHETAMINE SULFATE 5; 5; 5; 5 MG/1; MG/1; MG/1; MG/1
20 CAPSULE, EXTENDED RELEASE ORAL EVERY MORNING
Qty: 30 CAPSULE | Refills: 0 | Status: SHIPPED | OUTPATIENT
Start: 2023-07-27 | End: 2023-08-29 | Stop reason: SDUPTHER

## 2023-08-29 DIAGNOSIS — F90.0 ATTENTION DEFICIT HYPERACTIVITY DISORDER (ADHD), PREDOMINANTLY INATTENTIVE TYPE: ICD-10-CM

## 2023-08-29 RX ORDER — DEXTROAMPHETAMINE SACCHARATE, AMPHETAMINE ASPARTATE MONOHYDRATE, DEXTROAMPHETAMINE SULFATE AND AMPHETAMINE SULFATE 5; 5; 5; 5 MG/1; MG/1; MG/1; MG/1
20 CAPSULE, EXTENDED RELEASE ORAL EVERY MORNING
Qty: 30 CAPSULE | Refills: 0 | Status: SHIPPED | OUTPATIENT
Start: 2023-08-29 | End: 2023-09-30 | Stop reason: SDUPTHER

## 2023-10-13 ENCOUNTER — OFFICE VISIT (OUTPATIENT)
Dept: VASCULAR LAB | Facility: MEDICAL CENTER | Age: 33
End: 2023-10-13
Attending: FAMILY MEDICINE
Payer: COMMERCIAL

## 2023-10-13 VITALS
HEIGHT: 69 IN | SYSTOLIC BLOOD PRESSURE: 123 MMHG | WEIGHT: 243.6 LBS | HEART RATE: 108 BPM | DIASTOLIC BLOOD PRESSURE: 82 MMHG | BODY MASS INDEX: 36.08 KG/M2

## 2023-10-13 DIAGNOSIS — E66.9 OBESITY (BMI 30-39.9): ICD-10-CM

## 2023-10-13 DIAGNOSIS — Z87.891 FORMER SMOKER: ICD-10-CM

## 2023-10-13 DIAGNOSIS — I47.10 PSVT (PAROXYSMAL SUPRAVENTRICULAR TACHYCARDIA) (HCC): ICD-10-CM

## 2023-10-13 DIAGNOSIS — K51.90 ULCERATIVE COLITIS WITHOUT COMPLICATIONS, UNSPECIFIED LOCATION (HCC): Chronic | ICD-10-CM

## 2023-10-13 DIAGNOSIS — E78.2 MIXED HYPERLIPIDEMIA: ICD-10-CM

## 2023-10-13 PROCEDURE — 3079F DIAST BP 80-89 MM HG: CPT | Performed by: FAMILY MEDICINE

## 2023-10-13 PROCEDURE — 99204 OFFICE O/P NEW MOD 45 MIN: CPT | Performed by: FAMILY MEDICINE

## 2023-10-13 PROCEDURE — 3074F SYST BP LT 130 MM HG: CPT | Performed by: FAMILY MEDICINE

## 2023-10-13 PROCEDURE — 99212 OFFICE O/P EST SF 10 MIN: CPT

## 2023-10-13 ASSESSMENT — ENCOUNTER SYMPTOMS
CHILLS: 0
ORTHOPNEA: 0
COUGH: 0
PALPITATIONS: 0
SPUTUM PRODUCTION: 0
HEMOPTYSIS: 0
WHEEZING: 0
SHORTNESS OF BREATH: 0
CLAUDICATION: 0
FEVER: 0
PND: 0

## 2023-10-13 ASSESSMENT — FIBROSIS 4 INDEX: FIB4 SCORE: 0.34

## 2023-10-13 NOTE — PROGRESS NOTES
[facility] - Initial Visit  10/13/23     Ghassan Araujo has been referred for evaluation and management of dyslipidemia  Est 10/2023    Referral Source: Sean Bang M.D.     Subjective   Initial visit hx:  ongoing mixed HLD and has been using rosuvastatin. Had prior negative ETT 3/2023    LIFESTYLE MGMT:  Change in weight:  increasing over last 6mo  Body mass index is 35.97 kg/m².  Wt Readings from Last 3 Encounters:   10/13/23 110 kg (243 lb 9.6 oz)   06/27/23 107 kg (235 lb)   02/28/23 103 kg (227 lb)      Exercise habits: minimal exercise  Dietary patterns: common adult  Etoh: see sochx  Reported barriers to care: none    MEDICATION MGMT:  Current Prescription Lipid Lowering Medications - including dose:   Statin: rosuvastatin 20mg   Non-Statin: None  Current Lipid Lowering and Related Supplements: None  Any Current Side Effects Potentially Related to Lipid Lowering therapy? No    Current Adherence to Lipid Lowering Therapies: Complete  Previously Attempted Interventions for Lipids - including outcome  Statin: None      Outcome: N/A  Non-Statin: None   Outcome: N/A  Any Previous History of Statin Intolerance? As noted above     PERTINENT HLD PMHX:   Age at Initial Diagnosis of Dyslipidemia:  30s  Baseline Lipids Prior to Treatment:   Lab Results   Component Value Date/Time    CHOLSTRLTOT 264 (H) 02/01/2023 08:58 AM    LDL see below 02/01/2023 08:58 AM    HDL 35 (A) 02/01/2023 08:58 AM    TRIGLYCERIDE 421 (H) 02/01/2023 08:58 AM       History of ASCVD: None  Other Established (non-atherosclerotic) Vascular Disease, if Present: None  Secondary causes of dyslipidemia:  Endocrine/Hypothyroidism:  none reported   Liver disease: none reported   Renal disease/nephrotic syndrome:  none reported  Dietary-induced (ketogenic, lean mass hyper-responder)? no  Medications: None    ANTITHROMBOTIC THERAPY: No, no hx of bleeding    HTN: no dx or meds      T2D: No     Patient Active Problem List  "  Diagnosis   • ADHD   • Asthma   • Calcium pyrophosphate deposition disease   • PSVT (paroxysmal supraventricular tachycardia)   • Ulcerative colitis (HCC)   • Anxiety   • Annual physical exam   • Leukocytosis   • Dyslipidemia      has a past surgical history that includes hand surgery (Right, ).    Current Outpatient Medications:   •  amphetamine-dextroamphetamine, 20 mg, Oral, QAM, Taking  •  rosuvastatin, 20 mg, Oral, Q EVENING, Taking  •  Ascorbic Acid (VITAMIN C PO), , Taking  •  MAGNESIUM PO, , Taking  •  Cholecalciferol (VITAMIN D3 PO), , Taking  •  fluticasone-salmeterol, 2 Puff, Inhalation, DAILY, Taking  •  albuterol, 2 Puff, Inhalation, Q6HRS PRN, PRN  •  PROBIOTIC PRODUCT PO, Take  by mouth every day., Taking    ALLERGIES: Penicillins and Methotrexate    Family History   Problem Relation Age of Onset   • Cancer Maternal Grandmother         uterine   • Heart Disease Maternal Grandfather    • Heart Disease Paternal Grandmother    • Cancer Paternal Grandfather         Lymphoma     Social History     Tobacco Use   • Smoking status: Former     Current packs/day: 0.00     Types: Cigarettes     Quit date: 10/4/2021     Years since quittin.0   • Smokeless tobacco: Current     Types: Snuff, Chew   • Tobacco comments:     Nicotine pouches   Vaping Use   • Vaping Use: Former   Substance Use Topics   • Alcohol use: Yes     Comment: occ   • Drug use: Yes     Types: Marijuana     Comment: Occ       Review of Systems   Constitutional:  Negative for chills, fever and malaise/fatigue.   Respiratory:  Negative for cough, hemoptysis, sputum production, shortness of breath and wheezing.    Cardiovascular:  Negative for chest pain, palpitations, orthopnea, claudication, leg swelling and PND.         Objective    Vitals:    10/13/23 1421   BP: 123/82   BP Location: Left arm   Patient Position: Sitting   BP Cuff Size: Large adult   Pulse: (!) 108   Weight: 110 kg (243 lb 9.6 oz)   Height: 1.753 m (5' 9\")     BMI " "Readings from Last 1 Encounters:   10/13/23 35.97 kg/m²      BP Readings from Last 5 Encounters:   10/13/23 123/82   02/28/23 124/74   01/16/23 120/62   10/05/22 (!) 148/86   06/01/22 (!) 142/72     Physical Exam  Constitutional:       Appearance: Normal appearance. He is well-developed.   HENT:      Head: Normocephalic and atraumatic.   Eyes:      Conjunctiva/sclera: Conjunctivae normal.   Neck:      Thyroid: No thyromegaly.      Vascular: No JVD.   Cardiovascular:      Rate and Rhythm: Normal rate and regular rhythm.      Pulses:           Radial pulses are 2+ on the right side and 2+ on the left side.        Dorsalis pedis pulses are 2+ on the right side and 2+ on the left side.        Posterior tibial pulses are 2+ on the right side and 2+ on the left side.      Heart sounds: Normal heart sounds. No murmur heard.     No friction rub. No gallop.   Pulmonary:      Effort: Pulmonary effort is normal. No respiratory distress.      Breath sounds: Normal breath sounds.   Musculoskeletal:      Cervical back: Normal range of motion and neck supple.   Lymphadenopathy:      Cervical: No cervical adenopathy.   Skin:     General: Skin is warm and dry.   Neurological:      General: No focal deficit present.      Mental Status: He is alert and oriented to person, place, and time.      Cranial Nerves: No cranial nerve deficit.       DATA REVIEW:  Most Recent Lipid Panel:   Lab Results   Component Value Date/Time    CHOLSTRLTOT 264 (H) 02/01/2023 08:58 AM    LDL see below 02/01/2023 08:58 AM    HDL 35 (A) 02/01/2023 08:58 AM    TRIGLYCERIDE 421 (H) 02/01/2023 08:58 AM        No results found for: \"LIPOPROTA\"   No results found for: \"APOB\"      Other Pertinent Blood Work:   Lab Results   Component Value Date    SODIUM 139 02/01/2023    POTASSIUM 4.4 02/01/2023    CHLORIDE 103 02/01/2023    CO2 24 02/01/2023    ANION 12.0 02/01/2023    GLUCOSE 95 02/01/2023    BUN 13 02/01/2023    CREATININE 0.81 02/01/2023    CALCIUM 9.8 " 02/01/2023    ASTSGOT 17 10/05/2022    ALTSGPT 28 10/05/2022    ALKPHOSPHAT 96 10/05/2022    TBILIRUBIN 0.3 10/05/2022    ALBUMIN 4.5 10/05/2022    AGRATIO 1.4 10/05/2022    CREACTPROT <0.30 10/05/2022    TSHULTRASEN 2.130 02/01/2023     VASCULAR IMAGING:    ETT 3/2023   Negative stress ECG for ischemia.    Exercise capacity very good at >10 METS.         ASSESSMENT AND PLAN  1. Mixed hyperlipidemia  Comp Metabolic Panel    APOLIPOPROTEIN B    Lipid Profile    LDL, DIRECT    Lipoprotein (a)    CRP HIGH SENSITIVE (CARDIAC)    CBC WITHOUT DIFFERENTIAL    HEMOGLOBIN A1C      2. Ulcerative colitis without complications, unspecified location (HCC)  Comp Metabolic Panel    APOLIPOPROTEIN B    Lipid Profile    LDL, DIRECT    Lipoprotein (a)    CRP HIGH SENSITIVE (CARDIAC)    CBC WITHOUT DIFFERENTIAL    HEMOGLOBIN A1C      3. Former smoker        4. Obesity (BMI 30-39.9)  Comp Metabolic Panel    APOLIPOPROTEIN B    Lipid Profile    LDL, DIRECT    Lipoprotein (a)    CRP HIGH SENSITIVE (CARDIAC)    CBC WITHOUT DIFFERENTIAL    HEMOGLOBIN A1C      5. PSVT (paroxysmal supraventricular tachycardia)            Patient Type, check all that apply: Primary Prevention    Established Atherosclerotic Cardiovascular Disease (ASCVD): no    Other Established (non-atherosclerotic) Vascular Disease, if Present:     1) PSVT, s/p ablation - prior resting HR 90-100s, had improved   - ongoing care with cardiology     Evidence of genetic dyslipidemia (eg. Heterozygous Familial Hypercholesterolemia (HeFH)): Possible   Presumed dx:  FCHL based upon high LDL-C/high TG with hyperapoB/hyperTG phenotype (type IIb)  - most commonly inherited DLD (~1 in 200), generally polygenic often with significant famhx of DLD and premature ASCVD.  significant increased risk for premature CVD (>20% develop CHD <59yo) and requires aggressive lipid lowering interventions including diet and meds   FH genotyping: will consider in future     ACC/AHA Indication for Statin  Therapy, greg all that apply:  Primary Prevention- 20-40yo    Calculated Risk for ASCVD, if applicable    The ASCVD Risk score (Jazmine DK, et al., 2019) failed to calculate., N/A    Other Significant Risk Markers, if any, greg all that apply   Other: ordered   hsCRP low     Risk-enhancers: Persistently elevated trigs >174    Goal LDL-C and nonHDL-C based on Clinic Protocol  LDL-C <100 (if HTG, consider apoB <90, non-HDL-C <130)  At goal? No, 2/2023     LIFESTYLE INTERVENTIONS:    SMOKING: Stages of Change: Maintenance (sustained change >6mo)    reports that he quit smoking about 2 years ago. His smoking use included cigarettes. His smokeless tobacco use includes snuff and chew.   - continued complete avoidance of all tobacco products     PHYSICAL ACTIVITY: continue healthy activity to improve CV fitness.  In general, targeting >150min/week of moderate-level activity or as much as tolerated in light of functional status and co-morbidities     WEIGHT MANAGEMENT AND NUTRITION: Mediterranean style dietary approach  and Triglyceride-lowering diet with reduced fat calories, reduced simple carbohydrates, reduce/avoid alcohol      LIPID LOWERING MEDICATION MANAGEMENT:     Statin Therapy Recommendations from Today’s Visit:   - continue rosuvastatin 20mg daily     Non-Statin Medications Recommendations from Today’s Visit:   ZETIA: not currently indicated   NEXLETOL (avoid simva >20, prava >40): not currently indicated   BAS: not currently indicated   OMEGA-3 FAs: not currently indicated   FIBRATE:  not indicated   PCSK9 mAb: not indicated   LEQVIO: not indicated     Indication for PCSK9 Inhibitor strategy, if applicable: Not currently indicated    Supplements Recommended at this visit: None     RECOMMENDATIONS FOR OTHER CV RISK FACTORS:    1) BLOOD PRESSURE MANAGEMENT:  ACC/AHA (2017) goal <130/80  Home BP at goal: yes  Office BP at goal:  yes   Plan:   - continue healthy diet, activity, weight mgmt   Monitoring:   - routine  clinic-based BP measurements at least once annually   Medications: no meds indicated at this time      2) GLYCEMIC STATUS: Normal, at risk   - monitor annually     ANTITHROMBOTIC THERAPY None    OTHER ISSUES:    # UC, in remission since 2019, only minimal flares-ups - exacerbations will increase risk for CV inflammation - risk-enhancing.  Prior lialda and biologic  - continue f/u with GI     Studies Ordered at Todays Visit: None   Blood Work Ordered At Today’s visit: as noted above   Follow-Up: 3 months    Lamont Hanson M.D.  [facility], Board-certified clinical lipidologist   Vascular Medicine Clinic   [facility]   [phone]     CC:  ADRIANO Frederick Matthew A, M.D.

## 2023-11-21 ENCOUNTER — HOSPITAL ENCOUNTER (OUTPATIENT)
Dept: LAB | Facility: MEDICAL CENTER | Age: 33
End: 2023-11-21
Attending: INTERNAL MEDICINE
Payer: COMMERCIAL

## 2023-11-21 ENCOUNTER — TELEMEDICINE (OUTPATIENT)
Dept: MEDICAL GROUP | Facility: PHYSICIAN GROUP | Age: 33
End: 2023-11-21
Payer: COMMERCIAL

## 2023-11-21 VITALS — WEIGHT: 223 LBS | BODY MASS INDEX: 33.03 KG/M2 | HEIGHT: 69 IN

## 2023-11-21 DIAGNOSIS — K51.90 ULCERATIVE COLITIS WITHOUT COMPLICATIONS, UNSPECIFIED LOCATION (HCC): Chronic | ICD-10-CM

## 2023-11-21 DIAGNOSIS — I47.10 PSVT (PAROXYSMAL SUPRAVENTRICULAR TACHYCARDIA) (HCC): Chronic | ICD-10-CM

## 2023-11-21 DIAGNOSIS — J45.20 MILD INTERMITTENT ASTHMA WITHOUT COMPLICATION: Chronic | ICD-10-CM

## 2023-11-21 DIAGNOSIS — F90.0 ATTENTION DEFICIT HYPERACTIVITY DISORDER (ADHD), PREDOMINANTLY INATTENTIVE TYPE: Chronic | ICD-10-CM

## 2023-11-21 DIAGNOSIS — E66.9 OBESITY (BMI 30-39.9): ICD-10-CM

## 2023-11-21 DIAGNOSIS — E78.2 MIXED HYPERLIPIDEMIA: ICD-10-CM

## 2023-11-21 DIAGNOSIS — E78.5 DYSLIPIDEMIA: Chronic | ICD-10-CM

## 2023-11-21 LAB
ALBUMIN SERPL BCP-MCNC: 5 G/DL (ref 3.2–4.9)
ALBUMIN/GLOB SERPL: 1.7 G/DL
ALP SERPL-CCNC: 90 U/L (ref 30–99)
ALT SERPL-CCNC: 36 U/L (ref 2–50)
ALT SERPL-CCNC: 37 U/L (ref 2–50)
ANION GAP SERPL CALC-SCNC: 9 MMOL/L (ref 7–16)
ANION GAP SERPL CALC-SCNC: 9 MMOL/L (ref 7–16)
AST SERPL-CCNC: 21 U/L (ref 12–45)
BILIRUB SERPL-MCNC: 0.5 MG/DL (ref 0.1–1.5)
BUN SERPL-MCNC: 16 MG/DL (ref 8–22)
BUN SERPL-MCNC: 16 MG/DL (ref 8–22)
CALCIUM ALBUM COR SERPL-MCNC: 8.9 MG/DL (ref 8.5–10.5)
CALCIUM SERPL-MCNC: 9.7 MG/DL (ref 8.5–10.5)
CALCIUM SERPL-MCNC: 9.8 MG/DL (ref 8.5–10.5)
CHLORIDE SERPL-SCNC: 101 MMOL/L (ref 96–112)
CHLORIDE SERPL-SCNC: 102 MMOL/L (ref 96–112)
CHOLEST SERPL-MCNC: 201 MG/DL (ref 100–199)
CO2 SERPL-SCNC: 28 MMOL/L (ref 20–33)
CO2 SERPL-SCNC: 29 MMOL/L (ref 20–33)
CREAT SERPL-MCNC: 0.93 MG/DL (ref 0.5–1.4)
CREAT SERPL-MCNC: 0.95 MG/DL (ref 0.5–1.4)
CRP SERPL HS-MCNC: 2.8 MG/L (ref 0–3)
ERYTHROCYTE [DISTWIDTH] IN BLOOD BY AUTOMATED COUNT: 38.7 FL (ref 35.9–50)
EST. AVERAGE GLUCOSE BLD GHB EST-MCNC: 103 MG/DL
FASTING STATUS PATIENT QL REPORTED: NORMAL
GFR SERPLBLD CREATININE-BSD FMLA CKD-EPI: 109 ML/MIN/1.73 M 2
GFR SERPLBLD CREATININE-BSD FMLA CKD-EPI: 112 ML/MIN/1.73 M 2
GLOBULIN SER CALC-MCNC: 3 G/DL (ref 1.9–3.5)
GLUCOSE SERPL-MCNC: 95 MG/DL (ref 65–99)
GLUCOSE SERPL-MCNC: 96 MG/DL (ref 65–99)
HBA1C MFR BLD: 5.2 % (ref 4–5.6)
HCT VFR BLD AUTO: 46.4 % (ref 42–52)
HDLC SERPL-MCNC: 30 MG/DL
HGB BLD-MCNC: 16 G/DL (ref 14–18)
LDLC SERPL CALC-MCNC: 127 MG/DL
MCH RBC QN AUTO: 30.4 PG (ref 27–33)
MCHC RBC AUTO-ENTMCNC: 34.5 G/DL (ref 32.3–36.5)
MCV RBC AUTO: 88.2 FL (ref 81.4–97.8)
PLATELET # BLD AUTO: 281 K/UL (ref 164–446)
PMV BLD AUTO: 10.9 FL (ref 9–12.9)
POTASSIUM SERPL-SCNC: 4.3 MMOL/L (ref 3.6–5.5)
POTASSIUM SERPL-SCNC: 4.3 MMOL/L (ref 3.6–5.5)
PROT SERPL-MCNC: 8 G/DL (ref 6–8.2)
RBC # BLD AUTO: 5.26 M/UL (ref 4.7–6.1)
SODIUM SERPL-SCNC: 139 MMOL/L (ref 135–145)
SODIUM SERPL-SCNC: 139 MMOL/L (ref 135–145)
TRIGL SERPL-MCNC: 218 MG/DL (ref 0–149)
WBC # BLD AUTO: 6.8 K/UL (ref 4.8–10.8)

## 2023-11-21 PROCEDURE — 82172 ASSAY OF APOLIPOPROTEIN: CPT

## 2023-11-21 PROCEDURE — 36415 COLL VENOUS BLD VENIPUNCTURE: CPT

## 2023-11-21 PROCEDURE — 86141 C-REACTIVE PROTEIN HS: CPT

## 2023-11-21 PROCEDURE — 84460 ALANINE AMINO (ALT) (SGPT): CPT

## 2023-11-21 PROCEDURE — 80061 LIPID PANEL: CPT

## 2023-11-21 PROCEDURE — 80053 COMPREHEN METABOLIC PANEL: CPT

## 2023-11-21 PROCEDURE — 85027 COMPLETE CBC AUTOMATED: CPT

## 2023-11-21 PROCEDURE — 80048 BASIC METABOLIC PNL TOTAL CA: CPT

## 2023-11-21 PROCEDURE — G0481 DRUG TEST DEF 8-14 CLASSES: HCPCS

## 2023-11-21 PROCEDURE — 83036 HEMOGLOBIN GLYCOSYLATED A1C: CPT

## 2023-11-21 PROCEDURE — 83695 ASSAY OF LIPOPROTEIN(A): CPT

## 2023-11-21 PROCEDURE — 99214 OFFICE O/P EST MOD 30 MIN: CPT | Mod: 95 | Performed by: INTERNAL MEDICINE

## 2023-11-21 RX ORDER — DEXTROAMPHETAMINE SACCHARATE, AMPHETAMINE ASPARTATE MONOHYDRATE, DEXTROAMPHETAMINE SULFATE AND AMPHETAMINE SULFATE 5; 5; 5; 5 MG/1; MG/1; MG/1; MG/1
20 CAPSULE, EXTENDED RELEASE ORAL EVERY MORNING
Qty: 30 CAPSULE | Refills: 0 | Status: SHIPPED | OUTPATIENT
Start: 2023-11-21 | End: 2023-12-22 | Stop reason: SDUPTHER

## 2023-11-21 ASSESSMENT — FIBROSIS 4 INDEX: FIB4 SCORE: 0.34

## 2023-11-21 NOTE — ASSESSMENT & PLAN NOTE
Chronic condition.  Patient is under treatment and is followed by GI specialist.  No new symptoms reported.

## 2023-11-21 NOTE — ASSESSMENT & PLAN NOTE
Chronic condition.  The patient presently taking Adderall XR 20 Mg daily.  Patient tolerating medication well.  His symptoms are well-controlled with the medication.  Patient reported that he is able to focus on his daily tasks well.  Patient is requesting Rx refill.    In prescribing controlled substances to this patient, I certify that I have obtained and reviewed the medical history of the patient. I have also made a good daquan effort to obtain applicable records from other providers who have treated the patient.     I have reviewed patient's prescription history as maintained by the [place] Prescription Monitoring Program.      I have reviewed the medical records and have determined that a controlled substance treatment is medically indicated. I believe the benefits of controlled substance therapy outweigh the risks.      I have discussed the risks and benefits of treatment plan, and alternative therapies with the patient.  Also discussed with the patient regarding potential drug interactions with other medications.        Patient already signed the pain contract January 2023.  UDS ordered today.    Rx sent to pharmacy.

## 2023-11-21 NOTE — ASSESSMENT & PLAN NOTE
Chronic ongoing condition.  The patient followed by vascular medicine specialist patient currently taking Crestor 20 Mg daily.  Patient is due for lab test which has been ordered.

## 2023-11-21 NOTE — ASSESSMENT & PLAN NOTE
Chronic condition.  Followed by cardiology service.  The patient is status post ablation therapy.  No new symptoms reported.

## 2023-11-21 NOTE — ASSESSMENT & PLAN NOTE
Chronic condition.  The patient is currently using albuterol as needed.  At the present time the patient denies shortness of breath or wheezing or significant cough.

## 2023-11-21 NOTE — PROGRESS NOTES
This visit was conducted via  Zoom Video Virtual Visit using secure and encrypted videoconferencing technology.   The patient was at home in the state of [place]  The patient's identity was confirmed and verbal consent was obtained for this virtual visit.  -------------------------------------------------------------------------------    Chief Complaint   Patient presents with    Medication Refill      Follow-up ADHD  Refill Adderall  Ulcerative colitis  Hyperlipidemia  Asthma  PSVT        HPI: This is a 33 y.o. pt.  Pt's medical history is notable for:      ADHD  Chronic condition.  The patient presently taking Adderall XR 20 Mg daily.  Patient tolerating medication well.  His symptoms are well-controlled with the medication.  Patient reported that he is able to focus on his daily tasks well.  Patient is requesting Rx refill.    In prescribing controlled substances to this patient, I certify that I have obtained and reviewed the medical history of the patient. I have also made a good daquan effort to obtain applicable records from other providers who have treated the patient.     I have reviewed patient's prescription history as maintained by the [place] Prescription Monitoring Program.      I have reviewed the medical records and have determined that a controlled substance treatment is medically indicated. I believe the benefits of controlled substance therapy outweigh the risks.      I have discussed the risks and benefits of treatment plan, and alternative therapies with the patient.  Also discussed with the patient regarding potential drug interactions with other medications.        Patient already signed the pain contract January 2023.  UDS ordered today.    Rx sent to pharmacy.    Ulcerative colitis (HCC)  Chronic condition.  Patient is under treatment and is followed by GI specialist.  No new symptoms reported.    Dyslipidemia  Chronic ongoing condition.  The patient followed by vascular medicine specialist  patient currently taking Crestor 20 Mg daily.  Patient is due for lab test which has been ordered.    Asthma  Chronic condition.  The patient is currently using albuterol as needed.  At the present time the patient denies shortness of breath or wheezing or significant cough.    PSVT (paroxysmal supraventricular tachycardia) (HCC)  Chronic condition.  Followed by cardiology service.  The patient is status post ablation therapy.  No new symptoms reported.         Allergies: Penicillins and Methotrexate    Current Outpatient Medications Ordered in Epic   Medication Sig Dispense Refill    amphetamine-dextroamphetamine (ADDERALL XR, 20MG,) 20 MG per XR capsule Take 1 Capsule by mouth every morning for 30 days. 30 Capsule 0    rosuvastatin (CRESTOR) 20 MG Tab Take 1 Tablet by mouth every evening. 90 Tablet 3    MAGNESIUM PO       fluticasone-salmeterol (ADVAIR HFA) 230-21 MCG/ACT inhaler Inhale 2 Puffs every day. 1 Each 6    albuterol 108 (90 Base) MCG/ACT Aero Soln inhalation aerosol Inhale 2 Puffs every 6 hours as needed for Shortness of Breath. 1 Each 2    PROBIOTIC PRODUCT PO Take  by mouth every day.       No current [facility]-ordered facility-administered medications on file.       History reviewed. No pertinent past medical history.    Past Surgical History:   Procedure Laterality Date    HAND SURGERY Right        Family History   Problem Relation Age of Onset    Cancer Maternal Grandmother         uterine    Heart Disease Maternal Grandfather     Heart Disease Paternal Grandmother     Cancer Paternal Grandfather         Lymphoma       Social History     Tobacco Use   Smoking Status Former    Current packs/day: 0.00    Types: Cigarettes    Quit date: 10/4/2021    Years since quittin.1   Smokeless Tobacco Current    Types: Snuff, Chew   Tobacco Comments    Nicotine pouches       Social History     Substance and Sexual Activity   Alcohol Use Yes    Comment: occ         Review of systems:  As per HPI above. All  "other systems reviewed and negative.      Past Medical, Social, and Family history reviewed and updated in EPIC    ------------------------------------------------------------------    There were no vitals filed for this visit.  Vitals:    11/21/23 0715   Weight: 101 kg (223 lb)   Height: 1.753 m (5' 9\")        PHYSICAL EXAM:   Psych:  A&O x 3, mood and affect appropriate  Constitutional: no distress  Skin: No apparent rashes  ENMT: Lips without lesions. Phonation normal.  Respiratory: Unlabored respiratory effort        Lab Results   Component Value Date/Time    HBA1C 5.0 02/01/2023 08:58 AM       Lab Results   Component Value Date/Time    WBC 8.0 06/27/2023 12:55 PM    HEMOGLOBIN 15.6 06/27/2023 12:55 PM    HEMATOCRIT 43.8 06/27/2023 12:55 PM    MCV 86.2 06/27/2023 12:55 PM    PLATELETCT 299 06/27/2023 12:55 PM         Lab Results   Component Value Date/Time    SODIUM 139 02/01/2023 08:58 AM    POTASSIUM 4.4 02/01/2023 08:58 AM    GLUCOSE 95 02/01/2023 08:58 AM    BUN 13 02/01/2023 08:58 AM    CREATININE 0.81 02/01/2023 08:58 AM       Lab Results   Component Value Date/Time    CHOLSTRLTOT 264 (H) 02/01/2023 08:58 AM    LDL see below 02/01/2023 08:58 AM    HDL 35 (A) 02/01/2023 08:58 AM    TRIGLYCERIDE 421 (H) 02/01/2023 08:58 AM       Lab Results   Component Value Date/Time    ALTSGPT 28 10/05/2022 10:58 AM           ---------------------------------------------------------------------    ASSESSMENT and PLAN:     1. Attention deficit hyperactivity disorder (ADHD), predominantly inattentive type  - amphetamine-dextroamphetamine (ADDERALL XR, 20MG,) 20 MG per XR capsule; Take 1 Capsule by mouth every morning for 30 days.  Dispense: 30 Capsule; Refill: 0  Chronic stable condition.  Patient has been taking the medication.  Symptoms are well-controlled.  No side effects reported.  Urine drug test requested.  Refill Adderall sent to the pharmacy.  Recommend the next visit to be in person.    2. Ulcerative colitis " without complications, unspecified location (HCC)  - Basic Metabolic Panel; Future  This is a chronic condition.  Stable.  Continue follow-up with GI service as directed    3. Dyslipidemia  - ALANINE AMINO-TRANS; Future  Chronic condition.  Current status unclear.  Lab tests ordered for follow-up.  Continue Crestor 20 Mg daily      4. Mild intermittent asthma without complication  Chronic stable condition.  Continue albuterol as needed.  No new symptoms reported.  Continue to monitor    5. PSVT (paroxysmal supraventricular tachycardia)  Chronic stable condition status post ablation.  Recommend to follow-up with cardiology service.                      Please note that this dictation was created using voice recognition software. I have made every reasonable attempt to correct obvious errors, but I expect that there are errors of grammar and possibly content that I did not discover before finalizing the note.       Tee Gonzales MD  Internal Medicine  [facility]

## 2023-11-23 LAB
APO B100 SERPL-MCNC: 122 MG/DL (ref 66–133)
LDLC SERPL-MCNC: 131 MG/DL (ref 0–129)
LPA SERPL-MCNC: <6 MG/DL

## 2023-11-25 LAB
1OH-MIDAZOLAM UR QL SCN: NOT DETECTED
6MAM UR QL: NOT DETECTED
7AMINOCLONAZEPAM UR QL: NOT DETECTED
A-OH ALPRAZ UR QL: NOT DETECTED
ALPRAZ UR QL: NOT DETECTED
AMPHET UR QL SCN: NOT DETECTED
ANNOTATION COMMENT IMP: NORMAL
BARBITURATES UR QL: NEGATIVE
BUPRENORPHINE UR QL: NOT DETECTED
BZE UR QL: NEGATIVE
CARBOXYTHC UR QL: NORMAL
CARISOPRODOL UR QL: NEGATIVE
CLONAZEPAM UR QL: NOT DETECTED
CODEINE UR QL: NOT DETECTED
DIAZEPAM UR QL: NOT DETECTED
ETHYL GLUCURONIDE UR QL: NEGATIVE
FENTANYL UR QL: NOT DETECTED
GABAPENTIN UR QL CFM: NOT DETECTED
HYDROCODONE UR QL: NOT DETECTED
HYDROMORPHONE UR QL: NOT DETECTED
LORAZEPAM UR QL: NOT DETECTED
MDA UR QL: NOT DETECTED
MDEA UR QL: NOT DETECTED
MDMA UR QL: NOT DETECTED
ME-PHENIDATE UR QL: NOT DETECTED
METHADONE UR QL: NEGATIVE
METHAMPHET UR QL: NOT DETECTED
MIDAZOLAM UR QL SCN: NOT DETECTED
MORPHINE UR QL: NOT DETECTED
NALOXONE UR QL CFM: NOT DETECTED
NORBUPRENORPHINE UR QL CFM: NOT DETECTED
NORDIAZEPAM UR QL: NOT DETECTED
NORFENTANYL UR QL: NOT DETECTED
NORHYDROCODONE UR QL CFM: NOT DETECTED
NORMEPERIDINE UR QL CFM: NOT DETECTED
NOROXYCODONE UR QL CFM: NOT DETECTED
NOROXYMORPHONE UR QL SCN: NOT DETECTED
OXAZEPAM UR QL: NOT DETECTED
OXYCODONE UR QL: NOT DETECTED
OXYMORPHONE UR QL: NOT DETECTED
PATHOLOGY STUDY: NORMAL
PCP UR QL: NEGATIVE
PHENTERMINE UR QL: NOT DETECTED
PREGABALIN UR QL CFM: NOT DETECTED
SERVICE CMNT-IMP: NORMAL
TAPENTADOL UR QL SCN: NOT DETECTED
TAPENTADOL UR QL SCN: NOT DETECTED
TEMAZEPAM UR QL: NOT DETECTED
TRAMADOL UR QL: NEGATIVE
ZOLPIDEM PHENYL-4-CARB UR QL SCN: NOT DETECTED
ZOLPIDEM UR QL: NOT DETECTED

## 2023-11-27 DIAGNOSIS — F90.0 ATTENTION DEFICIT HYPERACTIVITY DISORDER (ADHD), PREDOMINANTLY INATTENTIVE TYPE: Chronic | ICD-10-CM

## 2023-11-28 ENCOUNTER — DOCUMENTATION (OUTPATIENT)
Dept: VASCULAR LAB | Facility: MEDICAL CENTER | Age: 33
End: 2023-11-28
Payer: COMMERCIAL

## 2023-11-28 NOTE — PROGRESS NOTES
Left message for patient to call back and relay below message from Dr. Hanson:    Lamont Hanson M.D.  P Vascular Medicine [facility] staff  please contact pt to determine if still using rosuvastatin 20mg consistently.  Cholesterol remains very elevated and having lower response than expected.  We may need to adjust to higher dosage or add a 2nd med.  Thanks       Donna Walsh, Medical Assistant   [facility] Medicine   Ph: [phone]  Fx: [phone]

## 2023-12-22 DIAGNOSIS — F90.0 ATTENTION DEFICIT HYPERACTIVITY DISORDER (ADHD), PREDOMINANTLY INATTENTIVE TYPE: Chronic | ICD-10-CM

## 2023-12-22 RX ORDER — DEXTROAMPHETAMINE SACCHARATE, AMPHETAMINE ASPARTATE MONOHYDRATE, DEXTROAMPHETAMINE SULFATE AND AMPHETAMINE SULFATE 5; 5; 5; 5 MG/1; MG/1; MG/1; MG/1
20 CAPSULE, EXTENDED RELEASE ORAL EVERY MORNING
Qty: 30 CAPSULE | Refills: 0 | Status: SHIPPED | OUTPATIENT
Start: 2023-12-22 | End: 2024-02-05 | Stop reason: SDUPTHER

## 2024-01-08 ENCOUNTER — TELEPHONE (OUTPATIENT)
Dept: VASCULAR LAB | Facility: MEDICAL CENTER | Age: 34
End: 2024-01-08

## 2024-01-08 NOTE — TELEPHONE ENCOUNTER
Caller: Ghassan Araujo     Topic/issue: Patient states Dr Hanson wants him to complete labs but there are no labs in the system, please advise     Callback Number: [phone]    Thank You   Lindsey KANG

## 2024-01-15 ENCOUNTER — APPOINTMENT (OUTPATIENT)
Dept: VASCULAR LAB | Facility: MEDICAL CENTER | Age: 34
End: 2024-01-15
Attending: FAMILY MEDICINE
Payer: COMMERCIAL

## 2024-01-22 ENCOUNTER — OFFICE VISIT (OUTPATIENT)
Dept: VASCULAR LAB | Facility: MEDICAL CENTER | Age: 34
End: 2024-01-22
Attending: FAMILY MEDICINE
Payer: COMMERCIAL

## 2024-01-22 VITALS
HEIGHT: 69 IN | SYSTOLIC BLOOD PRESSURE: 127 MMHG | DIASTOLIC BLOOD PRESSURE: 74 MMHG | WEIGHT: 220 LBS | HEART RATE: 91 BPM | BODY MASS INDEX: 32.58 KG/M2

## 2024-01-22 DIAGNOSIS — E78.2 MIXED HYPERLIPIDEMIA: Chronic | ICD-10-CM

## 2024-01-22 DIAGNOSIS — I47.10 PSVT (PAROXYSMAL SUPRAVENTRICULAR TACHYCARDIA) (HCC): ICD-10-CM

## 2024-01-22 DIAGNOSIS — K51.90 ULCERATIVE COLITIS WITHOUT COMPLICATIONS, UNSPECIFIED LOCATION (HCC): ICD-10-CM

## 2024-01-22 DIAGNOSIS — E66.9 OBESITY (BMI 30-39.9): ICD-10-CM

## 2024-01-22 DIAGNOSIS — Z87.891 FORMER SMOKER: ICD-10-CM

## 2024-01-22 PROCEDURE — 3078F DIAST BP <80 MM HG: CPT | Performed by: FAMILY MEDICINE

## 2024-01-22 PROCEDURE — 99212 OFFICE O/P EST SF 10 MIN: CPT

## 2024-01-22 PROCEDURE — 99213 OFFICE O/P EST LOW 20 MIN: CPT | Performed by: FAMILY MEDICINE

## 2024-01-22 PROCEDURE — 3074F SYST BP LT 130 MM HG: CPT | Performed by: FAMILY MEDICINE

## 2024-01-22 ASSESSMENT — ENCOUNTER SYMPTOMS
PND: 0
CHILLS: 0
SHORTNESS OF BREATH: 0
CLAUDICATION: 0
COUGH: 0
WHEEZING: 0
FEVER: 0
SPUTUM PRODUCTION: 0
ORTHOPNEA: 0
PALPITATIONS: 0
HEMOPTYSIS: 0

## 2024-01-22 ASSESSMENT — FIBROSIS 4 INDEX: FIB4 SCORE: 0.39

## 2024-01-22 NOTE — PROGRESS NOTES
[facility] - follow-up  10/13/23     Ghassan Araujo has been referred for evaluation and management of dyslipidemia  Est 10/2023    Referral Source: Sean Bang M.D.     Subjective   Interval hx/concerns: last seen 10/2023, tolerating meds.  Had labs.  No other new issues.    Down 30lbs.  Has not completed any additional labs since prior.     LIFESTYLE MGMT:  Change in weight: reducing over time   Body mass index is 32.49 kg/m².  Wt Readings from Last 3 Encounters:   01/22/24 99.8 kg (220 lb)   11/21/23 101 kg (223 lb)   10/13/23 110 kg (243 lb 9.6 oz)    Exercise habits: minimal exercise  , mostly work-related   Dietary patterns: Medi diet - strict   Etoh: see sochx  Reported barriers to care: none    MEDICATION MGMT:  Current Prescription Lipid Lowering Medications - including dose:   Statin: rosuvastatin 20mg   Non-Statin: None  Current Lipid Lowering and Related Supplements: None  Any Current Side Effects Potentially Related to Lipid Lowering therapy? No    Current Adherence to Lipid Lowering Therapies: Complete    PERTINENT HLD PMHX:   Initial visit hx:  ongoing mixed HLD and has been using rosuvastatin. Had prior negative ETT 3/2023  Age at Initial Diagnosis of Dyslipidemia:  30s  Baseline Lipids Prior to Treatment:   Lab Results   Component Value Date/Time    CHOLSTRLTOT 201 (H) 11/21/2023 09:07 AM     (H) 11/21/2023 09:07 AM    HDL 30 (A) 11/21/2023 09:07 AM    TRIGLYCERIDE 218 (H) 11/21/2023 09:07 AM       History of ASCVD: None  Other Established (non-atherosclerotic) Vascular Disease, if Present: None  Secondary causes of dyslipidemia: none   Previously Attempted Interventions for Lipids - including outcome - none   Any Previous History of Statin Intolerance? N/a     ANTITHROMBOTIC THERAPY: No, no hx of bleeding    HTN: no dx or meds      Current Outpatient Medications:     rosuvastatin, 20 mg, Oral, Q EVENING, Taking    MAGNESIUM PO, , Taking    fluticasone-salmeterol, 2  "Puff, Inhalation, DAILY, Taking    albuterol, 2 Puff, Inhalation, Q6HRS PRN, Taking    PROBIOTIC PRODUCT PO, Take  by mouth every day., Taking    Social History     Tobacco Use    Smoking status: Former     Current packs/day: 0.00     Types: Cigarettes     Quit date: 10/4/2021     Years since quittin.3    Smokeless tobacco: Current     Types: Snuff, Chew    Tobacco comments:     Nicotine pouches   Vaping Use    Vaping Use: Former   Substance Use Topics    Alcohol use: Yes     Comment: occ    Drug use: Yes     Types: Marijuana     Comment: Occ     Review of Systems   Constitutional:  Negative for chills, fever and malaise/fatigue.   Respiratory:  Negative for cough, hemoptysis, sputum production, shortness of breath and wheezing.    Cardiovascular:  Negative for chest pain, palpitations, orthopnea, claudication, leg swelling and PND.       Objective    Vitals:    24 0912   BP: 127/74   BP Location: Left arm   Patient Position: Sitting   BP Cuff Size: Adult long   Pulse: 91   Weight: 99.8 kg (220 lb)   Height: 1.753 m (5' 9\")     BMI Readings from Last 1 Encounters:   24 32.49 kg/m²      BP Readings from Last 5 Encounters:   24 127/74   10/13/23 123/82   23 124/74   23 120/62   10/05/22 (!) 148/86     Physical Exam  Constitutional:       Appearance: Normal appearance. He is well-developed.   HENT:      Head: Normocephalic and atraumatic.   Eyes:      Conjunctiva/sclera: Conjunctivae normal.   Neck:      Thyroid: No thyromegaly.      Vascular: No JVD.   Cardiovascular:      Rate and Rhythm: Normal rate and regular rhythm.      Pulses:           Radial pulses are 2+ on the right side and 2+ on the left side.        Dorsalis pedis pulses are 2+ on the right side and 2+ on the left side.        Posterior tibial pulses are 2+ on the right side and 2+ on the left side.      Heart sounds: Normal heart sounds. No murmur heard.     No friction rub. No gallop.   Pulmonary:      Effort: " Pulmonary effort is normal. No respiratory distress.      Breath sounds: Normal breath sounds.   Musculoskeletal:      Cervical back: Normal range of motion and neck supple.   Lymphadenopathy:      Cervical: No cervical adenopathy.   Skin:     General: Skin is warm and dry.   Neurological:      General: No focal deficit present.      Mental Status: He is alert and oriented to person, place, and time.      Cranial Nerves: No cranial nerve deficit.       DATA REVIEW:  Most Recent Lipid Panel:   Lab Results   Component Value Date/Time    CHOLSTRLTOT 201 (H) 11/21/2023 09:07 AM     (H) 11/21/2023 09:07 AM    HDL 30 (A) 11/21/2023 09:07 AM    TRIGLYCERIDE 218 (H) 11/21/2023 09:07 AM      Latest Reference Range & Units 11/21/23 09:06   LDL Direct 0 - 129 mg/dL 131 (H)        Lab Results   Component Value Date/Time    LIPOPROTA <6 11/21/2023 09:07 AM      Lab Results   Component Value Date/Time    APOB 122 11/21/2023 09:07 AM        Latest Reference Range & Units 11/21/23 09:07   C Reactive Protein High Sensitive 0.0 - 3.0 mg/L 2.8     Other Pertinent Blood Work:   Lab Results   Component Value Date    SODIUM 139 11/21/2023    POTASSIUM 4.3 11/21/2023    CHLORIDE 102 11/21/2023    CO2 28 11/21/2023    ANION 9.0 11/21/2023    GLUCOSE 95 11/21/2023    BUN 16 11/21/2023    CREATININE 0.95 11/21/2023    CALCIUM 9.7 11/21/2023    ASTSGOT 21 11/21/2023    ALTSGPT 37 11/21/2023    ALKPHOSPHAT 90 11/21/2023    TBILIRUBIN 0.5 11/21/2023    ALBUMIN 5.0 (H) 11/21/2023    AGRATIO 1.7 11/21/2023    CRPHIGHSEN 2.8 11/21/2023    CREACTPROT <0.30 10/05/2022    LIPOPROTA <6 11/21/2023    TSHULTRASEN 2.130 02/01/2023     VASCULAR IMAGING:    ETT 3/2023   Negative stress ECG for ischemia.    Exercise capacity very good at >10 METS.         ASSESSMENT AND PLAN  1. Mixed hyperlipidemia  CRP HIGH SENSITIVE (CARDIAC)    APOLIPOPROTEIN B    Lipid Profile      2. Ulcerative colitis without complications, unspecified location (HCC)        3.  Former smoker        4. Obesity (BMI 30-39.9)        5. PSVT (paroxysmal supraventricular tachycardia)          Patient Type, check all that apply: Primary Prevention    Established Atherosclerotic Cardiovascular Disease (ASCVD): no    Other Established (non-atherosclerotic) Vascular Disease, if Present:     1) PSVT, s/p ablation - prior resting HR 90-100s, had improved   - ongoing care with cardiology     Evidence of genetic dyslipidemia (eg. Heterozygous Familial Hypercholesterolemia (HeFH)): Possible   Presumed dx:  FCHL based upon high LDL-C/high TG with hyperapoB/hyperTG phenotype (type IIb)  - most commonly inherited DLD (~1 in 200), generally polygenic often with significant famhx of DLD and premature ASCVD.  significant increased risk for premature CVD (>20% develop CHD <61yo) and requires aggressive lipid lowering interventions including diet and meds   FH genotyping: will consider in future     ACC/AHA Indication for Statin Therapy, greg all that apply:  Primary Prevention- 20-40yo    Calculated Risk for ASCVD, if applicable    The ASCVD Risk score (Jazmine DK, et al., 2019) failed to calculate., N/A    Other Significant Risk Markers, if any, greg all that apply   hsCRP = 2.8 (average, upper limit normal)  Lp(a) <6    Risk-enhancers: Persistently elevated trigs >174    Goal LDL-C and nonHDL-C based on Clinic Protocol  LDL-C <100 (if HTG, consider apoB <90, non-HDL-C <130)  At goal? Pending new labs     LIFESTYLE INTERVENTIONS:    SMOKING: Stages of Change: Maintenance (sustained change >6mo)    reports that he quit smoking about 2 years ago. His smoking use included cigarettes. His smokeless tobacco use includes snuff and chew.   - continued complete avoidance of all tobacco products     PHYSICAL ACTIVITY: continue healthy activity to improve CV fitness.  In general, targeting >150min/week of moderate-level activity or as much as tolerated in light of functional status and co-morbidities     WEIGHT  MANAGEMENT AND NUTRITION: Mediterranean style dietary approach  and Triglyceride-lowering diet with reduced fat calories, reduced simple carbohydrates, reduce/avoid alcohol      LIPID LOWERING MEDICATION MANAGEMENT:     Statin Therapy Recommendations from Today’s Visit:   - continue rosuvastatin 20mg daily     Non-Statin Medications Recommendations from Today’s Visit: none   - add zetia as next agent     Indication for PCSK9 Inhibitor strategy, if applicable: Not currently indicated    Supplements Recommended at this visit: None     RECOMMENDATIONS FOR OTHER CV RISK FACTORS:    1) BLOOD PRESSURE MANAGEMENT:  - routine clinic-based BP measurements at least once annually   Medications: no meds indicated at this time      2) GLYCEMIC STATUS: Normal, at risk   - monitor annually     ANTITHROMBOTIC THERAPY None    OTHER ISSUES:    # UC, in remission since 2019, only minimal flares-ups - exacerbations will increase risk for CV inflammation - risk-enhancing.  Prior lialda and biologic  - continue f/u with GI     Studies Ordered at Todays Visit: None   Blood Work Ordered At Today’s visit: will need CMP, lipid, apoB prior to next   Follow-Up: 6mo    Lamont Hanson M.D.  AD, Board-certified clinical lipidologist   [facility]   [facility]   [phone]

## 2024-02-05 ENCOUNTER — OFFICE VISIT (OUTPATIENT)
Dept: MEDICAL GROUP | Facility: PHYSICIAN GROUP | Age: 34
End: 2024-02-05
Payer: COMMERCIAL

## 2024-02-05 VITALS
TEMPERATURE: 98.9 F | WEIGHT: 221 LBS | HEIGHT: 69 IN | HEART RATE: 88 BPM | SYSTOLIC BLOOD PRESSURE: 132 MMHG | OXYGEN SATURATION: 98 % | DIASTOLIC BLOOD PRESSURE: 78 MMHG | BODY MASS INDEX: 32.73 KG/M2

## 2024-02-05 DIAGNOSIS — K51.90 ULCERATIVE COLITIS WITHOUT COMPLICATIONS, UNSPECIFIED LOCATION (HCC): Chronic | ICD-10-CM

## 2024-02-05 DIAGNOSIS — M79.671 RIGHT FOOT PAIN: ICD-10-CM

## 2024-02-05 DIAGNOSIS — E66.9 OBESITY (BMI 30-39.9): ICD-10-CM

## 2024-02-05 DIAGNOSIS — J45.20 MILD INTERMITTENT ASTHMA WITHOUT COMPLICATION: Chronic | ICD-10-CM

## 2024-02-05 DIAGNOSIS — F90.0 ATTENTION DEFICIT HYPERACTIVITY DISORDER (ADHD), PREDOMINANTLY INATTENTIVE TYPE: Chronic | ICD-10-CM

## 2024-02-05 DIAGNOSIS — E78.5 DYSLIPIDEMIA: Chronic | ICD-10-CM

## 2024-02-05 DIAGNOSIS — Z23 NEED FOR VACCINATION: ICD-10-CM

## 2024-02-05 DIAGNOSIS — Z86.79 HISTORY OF PSVT (PAROXYSMAL SUPRAVENTRICULAR TACHYCARDIA): ICD-10-CM

## 2024-02-05 DIAGNOSIS — M76.61 TENDONITIS, ACHILLES, RIGHT: ICD-10-CM

## 2024-02-05 PROBLEM — M79.672 LEFT FOOT PAIN: Status: ACTIVE | Noted: 2024-02-05

## 2024-02-05 PROBLEM — E78.2 MIXED HYPERLIPIDEMIA: Status: RESOLVED | Noted: 2024-01-22 | Resolved: 2024-02-05

## 2024-02-05 PROCEDURE — 99215 OFFICE O/P EST HI 40 MIN: CPT | Mod: 25 | Performed by: INTERNAL MEDICINE

## 2024-02-05 PROCEDURE — 3075F SYST BP GE 130 - 139MM HG: CPT | Performed by: INTERNAL MEDICINE

## 2024-02-05 PROCEDURE — 90686 IIV4 VACC NO PRSV 0.5 ML IM: CPT | Performed by: INTERNAL MEDICINE

## 2024-02-05 PROCEDURE — 3078F DIAST BP <80 MM HG: CPT | Performed by: INTERNAL MEDICINE

## 2024-02-05 PROCEDURE — 90471 IMMUNIZATION ADMIN: CPT | Performed by: INTERNAL MEDICINE

## 2024-02-05 RX ORDER — DEXTROAMPHETAMINE SACCHARATE, AMPHETAMINE ASPARTATE MONOHYDRATE, DEXTROAMPHETAMINE SULFATE AND AMPHETAMINE SULFATE 5; 5; 5; 5 MG/1; MG/1; MG/1; MG/1
20 CAPSULE, EXTENDED RELEASE ORAL EVERY MORNING
Qty: 30 CAPSULE | Refills: 0 | Status: SHIPPED | OUTPATIENT
Start: 2024-02-05 | End: 2024-03-19 | Stop reason: SDUPTHER

## 2024-02-05 ASSESSMENT — PATIENT HEALTH QUESTIONNAIRE - PHQ9: CLINICAL INTERPRETATION OF PHQ2 SCORE: 0

## 2024-02-05 ASSESSMENT — FIBROSIS 4 INDEX: FIB4 SCORE: 0.39

## 2024-02-06 ENCOUNTER — HOSPITAL ENCOUNTER (OUTPATIENT)
Dept: RADIOLOGY | Facility: MEDICAL CENTER | Age: 34
End: 2024-02-06
Attending: INTERNAL MEDICINE
Payer: COMMERCIAL

## 2024-02-06 DIAGNOSIS — M79.671 RIGHT FOOT PAIN: ICD-10-CM

## 2024-02-06 PROCEDURE — 73650 X-RAY EXAM OF HEEL: CPT | Mod: RT

## 2024-02-06 NOTE — ASSESSMENT & PLAN NOTE
This is a new condition.  The patient reported pain right heel region since the last few days.  Patient denies recent trauma or injury.  Patient denies motor weakness or paresthesia.  Patient reported that the pain is severe and he needs to use crutches for mobility.

## 2024-02-06 NOTE — ASSESSMENT & PLAN NOTE
This is a chronic condition.  The patient being treated with Adderall XL 20 Mg daily.  Symptoms are well-controlled.  Patient tolerating medication well.  Patient requests Rx refill.    In prescribing controlled substances to this patient, I certify that I have obtained and reviewed the medical history of the patient. I have also made a good daquan effort to obtain applicable records from other providers who have treated the patient.     I have reviewed patient's prescription history as maintained by the [place] Prescription Monitoring Program.      I have reviewed the medical records and have determined that a controlled substance treatment is medically indicated. I believe the benefits of controlled substance therapy outweigh the risks.      I have discussed the risks and benefits of treatment plan, and alternative therapies with the patient.  Also discussed with the patient regarding potential drug interactions with other medications.     Patient signed consent form.    UDS ordered

## 2024-02-06 NOTE — ASSESSMENT & PLAN NOTE
Chronic condition   the patient currently on diet therapy.  Recent lipid panel result discussed with the patient.

## 2024-02-06 NOTE — ASSESSMENT & PLAN NOTE
Chronic condition.  Patient status post ablation 2009.   Pt currently fb cardiologist   Patient denies chest pain shortness of breath palpitation or near syncope at the present time

## 2024-02-06 NOTE — PROGRESS NOTES
PRIMARY CARE CLINIC VISIT        Chief Complaint   Patient presents with    Follow-Up     Right ankle, refills.       Follow-up ADHD  Rx refills  Hyperlipidemia  Ulcerative colitis  Asthma  History PSVT  Obesity  Request flu shot        History of Present Illness     ADHD  This is a chronic condition.  The patient being treated with Adderall XL 20 Mg daily.  Symptoms are well-controlled.  Patient tolerating medication well.  Patient requests Rx refill.    In prescribing controlled substances to this patient, I certify that I have obtained and reviewed the medical history of the patient. I have also made a good daquan effort to obtain applicable records from other providers who have treated the patient.     I have reviewed patient's prescription history as maintained by the [place] Prescription Monitoring Program.      I have reviewed the medical records and have determined that a controlled substance treatment is medically indicated. I believe the benefits of controlled substance therapy outweigh the risks.      I have discussed the risks and benefits of treatment plan, and alternative therapies with the patient.  Also discussed with the patient regarding potential drug interactions with other medications.     Patient signed consent form.    UDS ordered         Dyslipidemia  Chronic condition   the patient currently on diet therapy.  Recent lipid panel result discussed with the patient.    Ulcerative colitis (HCC)  Chronic condition.  Patient followed by GI service.  Patient denies nausea vomiting abdominal pain GI bleeding or change in bowel movement.    Asthma  This is a chronic condition.  The patient uses Advair 2 puff daily and albuterol as needed.  The patient denies shortness of breath wheezing or significant cough.    History of PSVT (paroxysmal supraventricular tachycardia)  Chronic condition.  Patient status post ablation 2009.   Pt currently fb cardiologist   Patient denies chest pain shortness of breath  palpitation or near syncope at the present time    Obesity (BMI 30-39.9)  Chronic condition.  Discussed with the patient regarding diet, exercise, and lifestyle modification to help achieve and maintain healthy weight         Need for vaccination  Patient request influenza vaccine    Right foot pain  This is a new condition.  The patient reported pain right heel region since the last few days.  Patient denies recent trauma or injury.  Patient denies motor weakness or paresthesia.  Patient reported that the pain is severe and he needs to use crutches for mobility.    Current Outpatient Medications on File Prior to Visit   Medication Sig Dispense Refill    rosuvastatin (CRESTOR) 20 MG Tab Take 1 Tablet by mouth every evening. 90 Tablet 3    fluticasone-salmeterol (ADVAIR HFA) 230-21 MCG/ACT inhaler Inhale 2 Puffs every day. 1 Each 6    albuterol 108 (90 Base) MCG/ACT Aero Soln inhalation aerosol Inhale 2 Puffs every 6 hours as needed for Shortness of Breath. 1 Each 2    PROBIOTIC PRODUCT PO Take  by mouth every day.      MAGNESIUM PO  (Patient not taking: Reported on 2/5/2024)       No current facility-administered medications on file prior to visit.        Allergies: Penicillins and Methotrexate    Current Outpatient Medications Ordered in Epic   Medication Sig Dispense Refill    amphetamine-dextroamphetamine (ADDERALL XR, 20MG,) 20 MG per XR capsule Take 1 Capsule by mouth every morning for 30 days. 30 Capsule 0    rosuvastatin (CRESTOR) 20 MG Tab Take 1 Tablet by mouth every evening. 90 Tablet 3    fluticasone-salmeterol (ADVAIR HFA) 230-21 MCG/ACT inhaler Inhale 2 Puffs every day. 1 Each 6    albuterol 108 (90 Base) MCG/ACT Aero Soln inhalation aerosol Inhale 2 Puffs every 6 hours as needed for Shortness of Breath. 1 Each 2    PROBIOTIC PRODUCT PO Take  by mouth every day.      MAGNESIUM PO  (Patient not taking: Reported on 2/5/2024)       No current [facility]-ordered facility-administered medications on file.  "      History reviewed. No pertinent past medical history.    Past Surgical History:   Procedure Laterality Date    HAND SURGERY Right        Family History   Problem Relation Age of Onset    Cancer Maternal Grandmother         uterine    Heart Disease Maternal Grandfather     Heart Disease Paternal Grandmother     Cancer Paternal Grandfather         Lymphoma       Social History     Tobacco Use   Smoking Status Former    Current packs/day: 0.00    Types: Cigarettes    Quit date: 10/4/2021    Years since quittin.3   Smokeless Tobacco Current    Types: Snuff, Chew   Tobacco Comments    Nicotine pouches       Social History     Substance and Sexual Activity   Alcohol Use Yes    Comment: occ       Review of systems.  As per HPI above. All other systems reviewed and negative.      Past Medical, Social, and Family history reviewed and updated in EPIC     Objective     /78 (BP Location: Right arm, Patient Position: Sitting, BP Cuff Size: Adult)   Pulse 88   Temp 37.2 °C (98.9 °F) (Temporal)   Ht 1.753 m (5' 9\")   Wt 100 kg (221 lb)   SpO2 98%    Body mass index is 32.64 kg/m².    General: alert in no apparent distress.  Cardiovascular: regular rate and rhythm  Pulmonary: lungs : no wheezing   Gastrointestinal: BS present.   Right foot.  Moderate pain noted with palpation of the right Achilles tendon.  There is no discharge or fluctuance noted    Lab Results   Component Value Date/Time    HBA1C 5.2 2023 09:07 AM    HBA1C 5.0 2023 08:58 AM       Lab Results   Component Value Date/Time    WBC 6.8 2023 09:07 AM    HEMOGLOBIN 16.0 2023 09:07 AM    HEMATOCRIT 46.4 2023 09:07 AM    MCV 88.2 2023 09:07 AM    PLATELETCT 281 2023 09:07 AM         Lab Results   Component Value Date/Time    SODIUM 139 2023 09:07 AM    POTASSIUM 4.3 2023 09:07 AM    GLUCOSE 95 2023 09:07 AM    BUN 16 2023 09:07 AM    CREATININE 0.95 2023 09:07 AM       Lab " Results   Component Value Date/Time    CHOLSTRLTOT 201 (H) 11/21/2023 09:07 AM    TRIGLYCERIDE 218 (H) 11/21/2023 09:07 AM    HDL 30 (A) 11/21/2023 09:07 AM     (H) 11/21/2023 09:07 AM       Lab Results   Component Value Date/Time    ALTSGPT 37 11/21/2023 09:07 AM             Assessment and Plan     1. Attention deficit hyperactivity disorder (ADHD), predominantly inattentive type  - amphetamine-dextroamphetamine (ADDERALL XR, 20MG,) 20 MG per XR capsule; Take 1 Capsule by mouth every morning for 30 days.  Dispense: 30 Capsule; Refill: 0  - Consent for Opiate Prescription  - Pain Management Screen; Future  Chronic stable condition.  Refill for Adderall XR 20 Mg daily given.  Advised the patient taking 1 capsule daily.  Patient tolerating medication well.  His symptoms are well-controlled.      2. Dyslipidemia  Chronic condition.  Lipid panel result discussed with the patient.  Continue Crestor 20 Mg daily.  Recommend low-fat low-cholesterol diet.  Repeat lab test next visit    3. Ulcerative colitis without complications, unspecified location (HCC)  Chronic condition.  Patient currently asymptomatic.  Patient request update to GI.  - Referral to Gastroenterology    4. Mild intermittent asthma without complication  Chronic stable condition.  Continue to use albuterol as needed.  Advair 1 puff twice daily.  Rinse mouth after use.  Currently the patient asymptomatic    5. History of PSVT (paroxysmal supraventricular tachycardia)  Chronic condition.  Advised the patient to continue follow-up with cardiology service as directed.    6. Obesity (BMI 30-39.9)  Chronic condition.  Recommend diet and lifestyle modification.  Encouraged patient to lose weight.    7. Need for vaccination  - INFLUENZA VACCINE QUAD INJ (PF)    8. Right foot pain  9. Tendonitis, Achilles, right  New condition.  Examination today is suggestive of Achilles tendinitis.  Rule out other causes    - Referral to Podiatry  - DX-OS CALCIS (HEEL) 2+  RIGHT; Future  - URIC ACID; Future    Wrote a  note for the patient to be on light duty from February 7 to February 21, 2024.            Attestation: I spent:   46  min -  That includes time for chart review before the visit, the actual patient visit, and time spent on documentation in EMR after the visit.  Chart review/prep, review of other providers' records, imaging/lab review, face-to-face time for history/examination, pt's counseling/education, ordering, prescribing,  review of results/meds/ treatment plan with patient, and care coordination.               Please note that this dictation was created using voice recognition software. I have made every reasonable attempt to correct obvious errors, but I expect that there are errors of grammar and possibly content that I did not discover before finalizing the note.    Tee Gonzales MD  Internal Medicine  [facility]

## 2024-02-06 NOTE — ASSESSMENT & PLAN NOTE
Chronic condition.  Patient followed by GI service.  Patient denies nausea vomiting abdominal pain GI bleeding or change in bowel movement.

## 2024-02-06 NOTE — ASSESSMENT & PLAN NOTE
This is a chronic condition.  The patient uses Advair 2 puff daily and albuterol as needed.  The patient denies shortness of breath wheezing or significant cough.

## 2024-02-07 ENCOUNTER — TELEPHONE (OUTPATIENT)
Dept: MEDICAL GROUP | Facility: PHYSICIAN GROUP | Age: 34
End: 2024-02-07
Payer: COMMERCIAL

## 2024-02-07 DIAGNOSIS — F98.8 ATTENTION DEFICIT DISORDER, UNSPECIFIED HYPERACTIVITY PRESENCE: ICD-10-CM

## 2024-02-07 RX ORDER — FLUTICASONE PROPIONATE AND SALMETEROL XINAFOATE 230; 21 UG/1; UG/1
2 AEROSOL, METERED RESPIRATORY (INHALATION) DAILY
Qty: 1 EACH | Refills: 0 | Status: SHIPPED | OUTPATIENT
Start: 2024-02-07

## 2024-02-07 NOTE — TELEPHONE ENCOUNTER
Received request via: Pharmacy    Was the patient seen in the last year in this department? Yes    Does the patient have an active prescription (recently filled or refills available) for medication(s) requested? No    Pharmacy Name: [facility]    Does the patient have [facility] Plus and need 100 day supply (blood pressure, diabetes and cholesterol meds only)? Patient does not have SCP

## 2024-02-08 NOTE — TELEPHONE ENCOUNTER
FINAL PRIOR AUTHORIZATION STATUS:    1.  Name of Medication & Dose: Adderall     2. Prior Auth Status: Approved through 2/6/25     3. Action Taken: Pharmacy Notified: yes Patient Notified: yes

## 2024-02-08 NOTE — TELEPHONE ENCOUNTER
DOCUMENTATION OF PAR STATUS:    1. Name of Medication & Dose: amphetamine-dextroamphet 20kg er cap     2. Name of Prescription Coverage Company & phone #: [employer]    3. Date Prior Auth Submitted: 2/7/2024    4. What information was given to obtain insurance decision? Cover My Meds    5. Prior Auth Status? Pending    6. Patient Notified: yes

## 2024-02-21 RX ORDER — FLUTICASONE FUROATE AND VILANTEROL 100; 25 UG/1; UG/1
1 POWDER RESPIRATORY (INHALATION) DAILY
Qty: 2 EACH | Refills: 6 | Status: SHIPPED | OUTPATIENT
Start: 2024-02-21 | End: 2024-03-19 | Stop reason: SDUPTHER

## 2024-02-29 ENCOUNTER — HOSPITAL ENCOUNTER (OUTPATIENT)
Dept: LAB | Facility: MEDICAL CENTER | Age: 34
End: 2024-02-29
Attending: INTERNAL MEDICINE
Payer: COMMERCIAL

## 2024-02-29 DIAGNOSIS — F90.0 ATTENTION DEFICIT HYPERACTIVITY DISORDER (ADHD), PREDOMINANTLY INATTENTIVE TYPE: Chronic | ICD-10-CM

## 2024-02-29 PROCEDURE — G0481 DRUG TEST DEF 8-14 CLASSES: HCPCS

## 2024-03-05 LAB
1OH-MIDAZOLAM UR QL SCN: NOT DETECTED
6MAM UR QL: NOT DETECTED
7AMINOCLONAZEPAM UR QL: NOT DETECTED
A-OH ALPRAZ UR QL: NOT DETECTED
ALPRAZ UR QL: NOT DETECTED
AMPHET UR QL SCN: PRESENT
ANNOTATION COMMENT IMP: NORMAL
BARBITURATES UR QL: NEGATIVE
BUPRENORPHINE UR QL: NOT DETECTED
BZE UR QL: NEGATIVE
CARBOXYTHC UR QL: NEGATIVE
CARISOPRODOL UR QL: NEGATIVE
CLONAZEPAM UR QL: NOT DETECTED
CODEINE UR QL: NOT DETECTED
CREAT UR-MCNC: 101.7 MG/DL (ref 20–400)
DIAZEPAM UR QL: NOT DETECTED
ETHYL GLUCURONIDE UR QL: NORMAL
FENTANYL UR QL: NOT DETECTED
GABAPENTIN UR QL CFM: NOT DETECTED
HYDROCODONE UR QL: NOT DETECTED
HYDROMORPHONE UR QL: NOT DETECTED
LORAZEPAM UR QL: NOT DETECTED
MDA UR QL: NOT DETECTED
MDEA UR QL: NOT DETECTED
MDMA UR QL: NOT DETECTED
ME-PHENIDATE UR QL: NOT DETECTED
METHADONE UR QL: NEGATIVE
METHAMPHET UR QL: NOT DETECTED
MIDAZOLAM UR QL SCN: NOT DETECTED
MORPHINE UR QL: NOT DETECTED
NALOXONE UR QL CFM: NOT DETECTED
NORBUPRENORPHINE UR QL CFM: NOT DETECTED
NORDIAZEPAM UR QL: NOT DETECTED
NORFENTANYL UR QL: NOT DETECTED
NORHYDROCODONE UR QL CFM: NOT DETECTED
NORMEPERIDINE UR QL CFM: NOT DETECTED
NOROXYCODONE UR QL CFM: NOT DETECTED
NOROXYMORPHONE UR QL SCN: NOT DETECTED
OXAZEPAM UR QL: NOT DETECTED
OXYCODONE UR QL: NOT DETECTED
OXYMORPHONE UR QL: NOT DETECTED
PATHOLOGY STUDY: NORMAL
PCP UR QL: NEGATIVE
PHENTERMINE UR QL: NOT DETECTED
PREGABALIN UR QL CFM: NOT DETECTED
SERVICE CMNT-IMP: NORMAL
TAPENTADOL UR QL SCN: NOT DETECTED
TAPENTADOL UR QL SCN: NOT DETECTED
TEMAZEPAM UR QL: NOT DETECTED
TRAMADOL UR QL: NEGATIVE
ZOLPIDEM PHENYL-4-CARB UR QL SCN: NOT DETECTED
ZOLPIDEM UR QL: NOT DETECTED

## 2024-03-19 DIAGNOSIS — F90.0 ATTENTION DEFICIT HYPERACTIVITY DISORDER (ADHD), PREDOMINANTLY INATTENTIVE TYPE: Chronic | ICD-10-CM

## 2024-03-20 RX ORDER — FLUTICASONE FUROATE AND VILANTEROL 100; 25 UG/1; UG/1
1 POWDER RESPIRATORY (INHALATION) DAILY
Qty: 2 EACH | Refills: 0 | Status: SHIPPED | OUTPATIENT
Start: 2024-03-20

## 2024-03-20 RX ORDER — DEXTROAMPHETAMINE SACCHARATE, AMPHETAMINE ASPARTATE MONOHYDRATE, DEXTROAMPHETAMINE SULFATE AND AMPHETAMINE SULFATE 5; 5; 5; 5 MG/1; MG/1; MG/1; MG/1
20 CAPSULE, EXTENDED RELEASE ORAL EVERY MORNING
Qty: 30 CAPSULE | Refills: 0 | Status: SHIPPED | OUTPATIENT
Start: 2024-03-20 | End: 2024-04-19

## 2024-03-23 DIAGNOSIS — F41.9 ANXIETY: ICD-10-CM

## 2024-03-23 RX ORDER — ALPRAZOLAM 0.25 MG/1
TABLET ORAL
Qty: 10 TABLET | Refills: 0 | Status: SHIPPED | OUTPATIENT
Start: 2024-03-23 | End: 2024-04-22

## 2024-04-23 ENCOUNTER — HOSPITAL ENCOUNTER (OUTPATIENT)
Dept: LAB | Facility: MEDICAL CENTER | Age: 34
End: 2024-04-23
Attending: SURGERY
Payer: COMMERCIAL

## 2024-04-23 DIAGNOSIS — M79.671 RIGHT FOOT PAIN: ICD-10-CM

## 2024-04-23 DIAGNOSIS — F90.0 ATTENTION DEFICIT HYPERACTIVITY DISORDER (ADHD), PREDOMINANTLY INATTENTIVE TYPE: Chronic | ICD-10-CM

## 2024-04-23 LAB — URATE SERPL-MCNC: 8.6 MG/DL (ref 2.5–8.3)

## 2024-04-23 PROCEDURE — 84550 ASSAY OF BLOOD/URIC ACID: CPT

## 2024-04-23 PROCEDURE — 36415 COLL VENOUS BLD VENIPUNCTURE: CPT

## 2024-04-23 RX ORDER — DEXTROAMPHETAMINE SACCHARATE, AMPHETAMINE ASPARTATE MONOHYDRATE, DEXTROAMPHETAMINE SULFATE AND AMPHETAMINE SULFATE 5; 5; 5; 5 MG/1; MG/1; MG/1; MG/1
20 CAPSULE, EXTENDED RELEASE ORAL EVERY MORNING
Qty: 30 CAPSULE | Refills: 0 | Status: SHIPPED | OUTPATIENT
Start: 2024-04-23 | End: 2024-05-23

## 2024-04-23 NOTE — TELEPHONE ENCOUNTER
Received request via: Patient    Was the patient seen in the last year in this department? Yes    Does the patient have an active prescription (recently filled or refills available) for medication(s) requested? No    Pharmacy Name: [facility]     Does the patient have skilled nursing Plus and need 100 day supply (blood pressure, diabetes and cholesterol meds only)? Patient does not have SCP

## 2024-04-24 DIAGNOSIS — M10.9 GOUT, UNSPECIFIED CAUSE, UNSPECIFIED CHRONICITY, UNSPECIFIED SITE: ICD-10-CM

## 2024-04-24 RX ORDER — ALLOPURINOL 300 MG/1
300 TABLET ORAL DAILY
Qty: 90 TABLET | Refills: 3 | Status: SHIPPED | OUTPATIENT
Start: 2024-04-24

## 2024-05-20 DIAGNOSIS — F90.0 ATTENTION DEFICIT HYPERACTIVITY DISORDER (ADHD), PREDOMINANTLY INATTENTIVE TYPE: Chronic | ICD-10-CM

## 2024-05-20 RX ORDER — DEXTROAMPHETAMINE SACCHARATE, AMPHETAMINE ASPARTATE MONOHYDRATE, DEXTROAMPHETAMINE SULFATE AND AMPHETAMINE SULFATE 5; 5; 5; 5 MG/1; MG/1; MG/1; MG/1
20 CAPSULE, EXTENDED RELEASE ORAL EVERY MORNING
Qty: 30 CAPSULE | Refills: 0 | Status: SHIPPED | OUTPATIENT
Start: 2024-05-20 | End: 2024-06-19

## 2024-05-20 NOTE — TELEPHONE ENCOUNTER
Received request via: Patient    Was the patient seen in the last year in this department? Yes    Does the patient have an active prescription (recently filled or refills available) for medication(s) requested? No    Pharmacy Name: [facility]     Does the patient have [facility] Plus and need 100 day supply (blood pressure, diabetes and cholesterol meds only)? Patient does not have SCP

## 2024-05-31 ENCOUNTER — TELEPHONE (OUTPATIENT)
Dept: CARDIOLOGY | Facility: MEDICAL CENTER | Age: 34
End: 2024-05-31
Payer: COMMERCIAL

## 2024-05-31 NOTE — TELEPHONE ENCOUNTER
Caller: CLIFF Hogan    Office Name, phone number, fax number:     [facility]  P: [phone]  F: [phone]    Fax clearance to [phone]    **Clearance faxed in on 5/28 & 5/31**    Procedure Name: COLONOSCOPY    Procedure Scheduled Date: 6/11/24    Callback Number: [phone]

## 2024-06-20 DIAGNOSIS — F90.0 ATTENTION DEFICIT HYPERACTIVITY DISORDER (ADHD), PREDOMINANTLY INATTENTIVE TYPE: Chronic | ICD-10-CM

## 2024-06-20 RX ORDER — ROSUVASTATIN CALCIUM 20 MG/1
20 TABLET, COATED ORAL EVERY EVENING
Qty: 90 TABLET | Refills: 0 | Status: SHIPPED | OUTPATIENT
Start: 2024-06-20

## 2024-06-20 RX ORDER — DEXTROAMPHETAMINE SACCHARATE, AMPHETAMINE ASPARTATE MONOHYDRATE, DEXTROAMPHETAMINE SULFATE AND AMPHETAMINE SULFATE 5; 5; 5; 5 MG/1; MG/1; MG/1; MG/1
20 CAPSULE, EXTENDED RELEASE ORAL EVERY MORNING
Qty: 30 CAPSULE | Refills: 0 | Status: SHIPPED | OUTPATIENT
Start: 2024-06-20 | End: 2024-07-20

## 2024-06-20 NOTE — TELEPHONE ENCOUNTER
Received request via: Pharmacy    Was the patient seen in the last year in this department? Yes    Does the patient have an active prescription (recently filled or refills available) for medication(s) requested? No    Pharmacy Name: [facility]    Does the patient have custodial Plus and need 100 day supply (blood pressure, diabetes and cholesterol meds only)? Patient does not have SCP

## 2024-06-20 NOTE — TELEPHONE ENCOUNTER
Received request via: Patient    Was the patient seen in the last year in this department? Yes    Does the patient have an active prescription (recently filled or refills available) for medication(s) requested? No    Pharmacy Name: [facility]    Does the patient have intermediate Plus and need 100 day supply (blood pressure, diabetes and cholesterol meds only)? Patient does not have SCP

## 2024-07-22 RX ORDER — MESALAMINE 1.2 G/1
TABLET, DELAYED RELEASE ORAL
COMMUNITY
Start: 2024-05-08

## 2024-07-23 ENCOUNTER — APPOINTMENT (OUTPATIENT)
Dept: MEDICAL GROUP | Facility: PHYSICIAN GROUP | Age: 34
End: 2024-07-23
Payer: COMMERCIAL

## 2024-07-23 VITALS
OXYGEN SATURATION: 98 % | RESPIRATION RATE: 16 BRPM | TEMPERATURE: 98.4 F | DIASTOLIC BLOOD PRESSURE: 64 MMHG | HEIGHT: 69 IN | SYSTOLIC BLOOD PRESSURE: 130 MMHG | WEIGHT: 226 LBS | HEART RATE: 94 BPM | BODY MASS INDEX: 33.47 KG/M2

## 2024-07-23 DIAGNOSIS — J45.20 MILD INTERMITTENT ASTHMA WITHOUT COMPLICATION: Chronic | ICD-10-CM

## 2024-07-23 DIAGNOSIS — K51.90 ULCERATIVE COLITIS WITHOUT COMPLICATIONS, UNSPECIFIED LOCATION (HCC): Chronic | ICD-10-CM

## 2024-07-23 DIAGNOSIS — E78.5 DYSLIPIDEMIA: Chronic | ICD-10-CM

## 2024-07-23 DIAGNOSIS — E66.3 OVERWEIGHT: ICD-10-CM

## 2024-07-23 DIAGNOSIS — Z23 NEED FOR VACCINATION: ICD-10-CM

## 2024-07-23 DIAGNOSIS — Z86.79 HISTORY OF PSVT (PAROXYSMAL SUPRAVENTRICULAR TACHYCARDIA): Chronic | ICD-10-CM

## 2024-07-23 DIAGNOSIS — Z51.81 THERAPEUTIC DRUG MONITORING: ICD-10-CM

## 2024-07-23 DIAGNOSIS — M10.9 GOUT, UNSPECIFIED CAUSE, UNSPECIFIED CHRONICITY, UNSPECIFIED SITE: Chronic | ICD-10-CM

## 2024-07-23 DIAGNOSIS — F90.0 ATTENTION DEFICIT HYPERACTIVITY DISORDER (ADHD), PREDOMINANTLY INATTENTIVE TYPE: Chronic | ICD-10-CM

## 2024-07-23 PROBLEM — E66.9 OBESITY (BMI 30-39.9): Chronic | Status: ACTIVE | Noted: 2023-11-21

## 2024-07-23 PROBLEM — M79.671 RIGHT FOOT PAIN: Status: RESOLVED | Noted: 2024-02-05 | Resolved: 2024-07-23

## 2024-07-23 PROCEDURE — 90471 IMMUNIZATION ADMIN: CPT | Performed by: INTERNAL MEDICINE

## 2024-07-23 PROCEDURE — 3078F DIAST BP <80 MM HG: CPT | Performed by: INTERNAL MEDICINE

## 2024-07-23 PROCEDURE — 90746 HEPB VACCINE 3 DOSE ADULT IM: CPT | Performed by: INTERNAL MEDICINE

## 2024-07-23 PROCEDURE — 99214 OFFICE O/P EST MOD 30 MIN: CPT | Mod: 25 | Performed by: INTERNAL MEDICINE

## 2024-07-23 PROCEDURE — 3075F SYST BP GE 130 - 139MM HG: CPT | Performed by: INTERNAL MEDICINE

## 2024-07-23 RX ORDER — DEXTROAMPHETAMINE SACCHARATE, AMPHETAMINE ASPARTATE MONOHYDRATE, DEXTROAMPHETAMINE SULFATE AND AMPHETAMINE SULFATE 5; 5; 5; 5 MG/1; MG/1; MG/1; MG/1
20 CAPSULE, EXTENDED RELEASE ORAL EVERY MORNING
Qty: 30 CAPSULE | Refills: 0 | Status: SHIPPED | OUTPATIENT
Start: 2024-07-23 | End: 2024-08-22

## 2024-07-23 RX ORDER — FLUTICASONE FUROATE AND VILANTEROL 100; 25 UG/1; UG/1
1 POWDER RESPIRATORY (INHALATION) DAILY
Qty: 2 EACH | Refills: 6 | Status: SHIPPED | OUTPATIENT
Start: 2024-07-23

## 2024-07-23 ASSESSMENT — FIBROSIS 4 INDEX: FIB4 SCORE: 0.39

## 2024-07-29 ENCOUNTER — HOSPITAL ENCOUNTER (OUTPATIENT)
Dept: LAB | Facility: MEDICAL CENTER | Age: 34
End: 2024-07-29
Attending: INTERNAL MEDICINE
Payer: COMMERCIAL

## 2024-07-29 DIAGNOSIS — Z51.81 THERAPEUTIC DRUG MONITORING: ICD-10-CM

## 2024-07-29 PROCEDURE — 80307 DRUG TEST PRSMV CHEM ANLYZR: CPT

## 2024-07-31 LAB
AMPHET CTO UR CFM-MCNC: NORMAL NG/ML
BARBITURATES CTO UR CFM-MCNC: NEGATIVE NG/ML
BENZODIAZ CTO UR CFM-MCNC: NEGATIVE NG/ML
BUPRENORPHINE UR-MCNC: NEGATIVE NG/ML
CANNABINOIDS CTO UR CFM-MCNC: NEGATIVE NG/ML
CARISOPRODOL UR-MCNC: NEGATIVE NG/ML
COCAINE CTO UR CFM-MCNC: NEGATIVE NG/ML
CREAT UR-MCNC: 90.1 MG/DL (ref 20–400)
DRUG SCREEN COMMENT UR-IMP: NORMAL
ETHYL GLUCURONIDE UR QL SCN: NEGATIVE NG/ML
FENTANYL UR-MCNC: NEGATIVE NG/ML
MEPERIDINE CTO UR SCN-MCNC: NEGATIVE NG/ML
METHADONE CTO UR CFM-MCNC: NEGATIVE NG/ML
OPIATES UR QL SCN: NEGATIVE NG/ML
OXYCDOXYM URSCRN 2005102: NEGATIVE NG/ML
PCP CTO UR CFM-MCNC: NEGATIVE NG/ML
PROPOXYPH CTO UR CFM-MCNC: NEGATIVE NG/ML
TAPENTADOL UR-MCNC: NEGATIVE NG/ML
TRAMADOL CTO UR SCN-MCNC: NEGATIVE NG/ML
ZOLPIDEM UR-MCNC: NEGATIVE NG/ML

## 2024-08-02 LAB
AMPHET UR CFM-MCNC: >5000 NG/ML
MDA UR CFM-MCNC: <200 NG/ML
MDEA UR CFM-MCNC: <200 NG/ML
MDMA UR CFM-MCNC: <200 NG/ML
METHAMPHET UR CFM-MCNC: <200 NG/ML
PHENTERMINE UR CFM-MCNC: <200 NG/ML

## 2024-08-05 DIAGNOSIS — E78.2 MIXED HYPERLIPIDEMIA: ICD-10-CM

## 2024-08-05 NOTE — PROGRESS NOTES
Established patient  Chart prep for upcoming appointment.    Any pending/incomplete orders from last visit? Yes Labs  Was patient called and reminded to complete pending orders? Yes  Were any records requested?  No    Referral up to date? Yes renewal ordered, sent to provider to co-sign, AND new referral attached to upcoming appointment.    Referral attached to appointment (renewals and New patients only)? Yes  Virtual appointment? No          Donna Walsh, Medical Assistant   [facility]   Ph: [phone]  Fx: [phone]

## 2024-08-12 ENCOUNTER — OFFICE VISIT (OUTPATIENT)
Dept: VASCULAR LAB | Facility: MEDICAL CENTER | Age: 34
End: 2024-08-12
Attending: NURSE PRACTITIONER
Payer: COMMERCIAL

## 2024-08-12 VITALS
DIASTOLIC BLOOD PRESSURE: 83 MMHG | HEIGHT: 69 IN | WEIGHT: 235 LBS | SYSTOLIC BLOOD PRESSURE: 142 MMHG | HEART RATE: 99 BPM | BODY MASS INDEX: 34.8 KG/M2

## 2024-08-12 DIAGNOSIS — M76.61 TENDONITIS, ACHILLES, RIGHT: ICD-10-CM

## 2024-08-12 DIAGNOSIS — E78.2 MIXED HYPERLIPIDEMIA: ICD-10-CM

## 2024-08-12 DIAGNOSIS — M10.9 GOUT, UNSPECIFIED CAUSE, UNSPECIFIED CHRONICITY, UNSPECIFIED SITE: Chronic | ICD-10-CM

## 2024-08-12 DIAGNOSIS — E78.5 DYSLIPIDEMIA: Chronic | ICD-10-CM

## 2024-08-12 PROCEDURE — 99214 OFFICE O/P EST MOD 30 MIN: CPT | Performed by: NURSE PRACTITIONER

## 2024-08-12 PROCEDURE — 99212 OFFICE O/P EST SF 10 MIN: CPT

## 2024-08-12 PROCEDURE — 3079F DIAST BP 80-89 MM HG: CPT | Performed by: NURSE PRACTITIONER

## 2024-08-12 PROCEDURE — 3077F SYST BP >= 140 MM HG: CPT | Performed by: NURSE PRACTITIONER

## 2024-08-12 RX ORDER — ROSUVASTATIN CALCIUM 10 MG/1
10 TABLET, COATED ORAL EVERY EVENING
Qty: 100 TABLET | Refills: 3 | Status: SHIPPED | OUTPATIENT
Start: 2024-08-12

## 2024-08-12 ASSESSMENT — FIBROSIS 4 INDEX: FIB4 SCORE: 0.39

## 2024-08-12 ASSESSMENT — ENCOUNTER SYMPTOMS
SHORTNESS OF BREATH: 0
CLAUDICATION: 0
FEVER: 0
ORTHOPNEA: 0
HEMOPTYSIS: 0
COUGH: 0
WHEEZING: 0
CHILLS: 0
SPUTUM PRODUCTION: 0
PALPITATIONS: 0
PND: 0

## 2024-08-12 NOTE — PROGRESS NOTES
[facility] - follow-up  08/12/2024    Ghassan Araujo has been referred for evaluation and management of dyslipidemia  Est 10/2023    Referral Source: Sean Bang M.D.     Subjective   Interval hx/concerns: last seen 11/2023  Had tendonitis flair in ankle and felt it may have been related to rosuvastatin  Stopped statin in Feb   Taking allopurinol due to gout as well    Previously had lost about 30 lbs, gained all back but about 10 lbs  Had a pretty extreme stance previously-- cut out a lot of different foods, then during holidays overindulged   He is however motivated to make lifestyle changes that are manageable     LIFESTYLE MGMT:  Change in weight: up 9 lbs   Body mass index is 34.7 kg/m².  Wt Readings from Last 3 Encounters:   08/12/24 107 kg (235 lb)   07/23/24 103 kg (226 lb)   02/05/24 100 kg (221 lb)    Exercise habits: minimal exercise   Dietary patterns: Medi diet - strict   Etoh: see sochx  Reported barriers to care: none    MEDICATION MGMT:  Current Prescription Lipid Lowering Medications - including dose:   Statin: None    Non-Statin: None  Current Lipid Lowering and Related Supplements: None  Any Current Side Effects Potentially Related to Lipid Lowering therapy? No    Current Adherence to Lipid Lowering Therapies: Complete    PERTINENT HLD PMHX:   Initial visit hx:  ongoing mixed HLD Had prior negative ETT 3/2023  Age at Initial Diagnosis of Dyslipidemia:  30s  Baseline Lipids Prior to Treatment:   Lab Results   Component Value Date/Time    CHOLSTRLTOT 201 (H) 11/21/2023 09:07 AM     (H) 11/21/2023 09:07 AM    HDL 30 (A) 11/21/2023 09:07 AM    TRIGLYCERIDE 218 (H) 11/21/2023 09:07 AM       History of ASCVD: None  Other Established (non-atherosclerotic) Vascular Disease, if Present: None  Secondary causes of dyslipidemia: none   Previously Attempted Interventions for Lipids - including outcome - none   Any Previous History of Statin Intolerance? N/a     ANTITHROMBOTIC  "THERAPY: No, no hx of bleeding    HTN: no dx or meds      Current Outpatient Medications:     rosuvastatin, 10 mg, Oral, Q EVENING    amphetamine-dextroamphetamine, 20 mg, Oral, QAM, Taking    mesalamine, , Taking    allopurinol, 300 mg, Oral, DAILY, Taking    MAGNESIUM PO, , Taking    albuterol, 2 Puff, Inhalation, Q6HRS PRN, Taking    PROBIOTIC PRODUCT PO, Take  by mouth every day., Taking    fluticasone furoate-vilanterol, 1 Puff, Inhalation, DAILY (Patient not taking: Reported on 2024), Not Taking    Social History     Tobacco Use    Smoking status: Former     Current packs/day: 0.00     Types: Cigarettes     Quit date: 10/4/2021     Years since quittin.8    Smokeless tobacco: Current     Types: Snuff, Chew    Tobacco comments:     Nicotine pouches   Vaping Use    Vaping status: Former   Substance Use Topics    Alcohol use: Yes     Comment: occ    Drug use: Yes     Types: Marijuana     Comment: Occ     Review of Systems   Constitutional:  Negative for chills, fever and malaise/fatigue.   Respiratory:  Negative for cough, hemoptysis, sputum production, shortness of breath and wheezing.    Cardiovascular:  Negative for chest pain, palpitations, orthopnea, claudication, leg swelling and PND.       Objective    Vitals:    24 1543 24 1547   BP: (!) 145/85 (!) 142/83   BP Location: Left arm Left arm   Patient Position: Sitting Sitting   BP Cuff Size: Adult Adult   Pulse: 94 99   Weight: 107 kg (235 lb)    Height: 1.753 m (5' 9\")      BMI Readings from Last 1 Encounters:   24 34.70 kg/m²      BP Readings from Last 5 Encounters:   24 (!) 142/83   24 130/64   24 132/78   24 127/74   10/13/23 123/82     Physical Exam  Constitutional:       Appearance: Normal appearance. He is well-developed.   HENT:      Head: Normocephalic and atraumatic.   Eyes:      Conjunctiva/sclera: Conjunctivae normal.   Neck:      Thyroid: No thyromegaly.      Vascular: No JVD.   Cardiovascular: "      Rate and Rhythm: Normal rate and regular rhythm.      Pulses:           Radial pulses are 2+ on the right side and 2+ on the left side.        Dorsalis pedis pulses are 2+ on the right side and 2+ on the left side.        Posterior tibial pulses are 2+ on the right side and 2+ on the left side.      Heart sounds: Normal heart sounds. No murmur heard.     No friction rub. No gallop.   Pulmonary:      Effort: Pulmonary effort is normal. No respiratory distress.      Breath sounds: Normal breath sounds.   Musculoskeletal:      Cervical back: Normal range of motion and neck supple.   Lymphadenopathy:      Cervical: No cervical adenopathy.   Skin:     General: Skin is warm and dry.   Neurological:      General: No focal deficit present.      Mental Status: He is alert and oriented to person, place, and time.      Cranial Nerves: No cranial nerve deficit.       DATA REVIEW:  Most Recent Lipid Panel:   Lab Results   Component Value Date/Time    CHOLSTRLTOT 201 (H) 11/21/2023 09:07 AM     (H) 11/21/2023 09:07 AM    HDL 30 (A) 11/21/2023 09:07 AM    TRIGLYCERIDE 218 (H) 11/21/2023 09:07 AM      Latest Reference Range & Units 11/21/23 09:06   LDL Direct 0 - 129 mg/dL 131 (H)        Lab Results   Component Value Date/Time    LIPOPROTA <6 11/21/2023 09:07 AM      Lab Results   Component Value Date/Time    APOB 122 11/21/2023 09:07 AM        Latest Reference Range & Units 11/21/23 09:07   C Reactive Protein High Sensitive 0.0 - 3.0 mg/L 2.8     Other Pertinent Blood Work:   Lab Results   Component Value Date    SODIUM 139 11/21/2023    POTASSIUM 4.3 11/21/2023    CHLORIDE 102 11/21/2023    CO2 28 11/21/2023    ANION 9.0 11/21/2023    GLUCOSE 95 11/21/2023    BUN 16 11/21/2023    CREATININE 0.95 11/21/2023    CALCIUM 9.7 11/21/2023    ASTSGOT 21 11/21/2023    ALTSGPT 37 11/21/2023    ALKPHOSPHAT 90 11/21/2023    TBILIRUBIN 0.5 11/21/2023    ALBUMIN 5.0 (H) 11/21/2023    AGRATIO 1.7 11/21/2023    CRPHIGHSEN 2.8  11/21/2023    CREACTPROT <0.30 10/05/2022    LIPOPROTA <6 11/21/2023    TSHULTRASEN 2.130 02/01/2023     VASCULAR IMAGING:    ETT 3/2023   Negative stress ECG for ischemia.    Exercise capacity very good at >10 METS.         ASSESSMENT AND PLAN  1. Mixed hyperlipidemia  rosuvastatin (CRESTOR) 10 MG Tab    Lipid Profile    APOLIPOPROTEIN B      2. Gout, unspecified cause, unspecified chronicity, unspecified site  URIC ACID      3. Dyslipidemia        4. BMI 34.0-34.9,adult        5. Tendonitis, Achilles, right          Patient Type, check all that apply: Primary Prevention    Established Atherosclerotic Cardiovascular Disease (ASCVD): no    Other Established (non-atherosclerotic) Vascular Disease, if Present:     1) PSVT, s/p ablation - prior resting HR 90-100s, had improved   - ongoing care with cardiology     Evidence of genetic dyslipidemia (eg. Heterozygous Familial Hypercholesterolemia (HeFH)): Possible   Presumed dx:  FCHL based upon high LDL-C/high TG with hyperapoB/hyperTG phenotype (type IIb)  - most commonly inherited DLD (~1 in 200), generally polygenic often with significant famhx of DLD and premature ASCVD.  significant increased risk for premature CVD (>20% develop CHD <61yo) and requires aggressive lipid lowering interventions including diet and meds   FH genotyping: will consider in future     ACC/AHA Indication for Statin Therapy, greg all that apply:  Primary Prevention- 20-38yo    Calculated Risk for ASCVD, if applicable    The ASCVD Risk score (Jazmine DK, et al., 2019) failed to calculate., N/A    Other Significant Risk Markers, if any, greg all that apply   hsCRP = 2.8 (average, upper limit normal)  Lp(a) <6    Risk-enhancers: Persistently elevated trigs >174    Goal LDL-C and nonHDL-C based on Clinic Protocol  LDL-C <100 (if HTG, consider apoB <90, non-HDL-C <130)  At goal? No 11/2023- needs new labs    LIFESTYLE INTERVENTIONS:    SMOKING: Stages of Change: Maintenance (sustained change >6mo)     reports that he quit smoking about 2 years ago. His smoking use included cigarettes. His smokeless tobacco use includes snuff and chew.   - continued complete avoidance of all tobacco products     PHYSICAL ACTIVITY: increase daily activity with walking or activity such as hockey which he enjoys.  Goal is, targeting >150min/week of moderate-level activity or as much as tolerated in light of functional status and co-morbidities     WEIGHT MANAGEMENT AND NUTRITION: Mediterranean style dietary approach  and Triglyceride-lowering diet with reduced fat calories, reduced simple carbohydrates, reduce/avoid alcohol -- long discussion on making manageable, lasting changes to dietary choices as maintenance of weight loss will be difficult if he returns to previous habits. He is in     LIPID LOWERING MEDICATION MANAGEMENT:     Statin Therapy Recommendations from Today’s Visit:   Discussed risks and benefits of statin use and would like to retrial lower dosage of rosuvastatin which he had been tolerating previously.  He also had a gout flair at the same time, so a bit difficult to determine which caused the tendinopathy.  Through SDM, we have elected to restart rosuvastatin at lower dose, alternate day dosing and increase as tolerated.      - restart rosuvastatin at 10mg every other day then increase as tolerated, if tendonitis symptoms return, stop and call our office     Non-Statin Medications Recommendations from Today’s Visit: none   - add zetia as next agent     Indication for PCSK9 Inhibitor strategy, if applicable: Not currently indicated    Supplements Recommended at this visit: None     RECOMMENDATIONS FOR OTHER CV RISK FACTORS:    1) BLOOD PRESSURE MANAGEMENT:  - routine clinic-based BP measurements at least once annually   Medications: no meds indicated at this time      2) GLYCEMIC STATUS: Normal, at risk   - monitor annually     ANTITHROMBOTIC THERAPY None    OTHER ISSUES:    # UC, in remission since 2019, only  minimal flares-ups - exacerbations will increase risk for CV inflammation - risk-enhancing.  Prior lialda and biologic  - continue f/u with GI     Studies Ordered at Todays Visit: None   Blood Work Ordered At Today’s visit: lipid, apo B, uric acid   Follow-Up: 6mo    GISELA Stanford  [facility]   [facility]   [phone]

## 2024-08-23 DIAGNOSIS — F90.0 ATTENTION DEFICIT HYPERACTIVITY DISORDER (ADHD), PREDOMINANTLY INATTENTIVE TYPE: Chronic | ICD-10-CM

## 2024-08-23 NOTE — TELEPHONE ENCOUNTER
Received request via: Patient    Was the patient seen in the last year in this department? Yes    Does the patient have an active prescription (recently filled or refills available) for medication(s) requested? No    Ph[facility] Name: [facility]     Does the patient have [facility] and need 100-day supply? (This applies to ALL medications) Patient does not have SCP

## 2024-08-24 RX ORDER — DEXTROAMPHETAMINE SACCHARATE, AMPHETAMINE ASPARTATE MONOHYDRATE, DEXTROAMPHETAMINE SULFATE AND AMPHETAMINE SULFATE 5; 5; 5; 5 MG/1; MG/1; MG/1; MG/1
20 CAPSULE, EXTENDED RELEASE ORAL EVERY MORNING
Qty: 30 CAPSULE | Refills: 0 | Status: SHIPPED | OUTPATIENT
Start: 2024-08-24 | End: 2024-09-23

## 2024-09-23 DIAGNOSIS — J45.20 MILD INTERMITTENT ASTHMA WITHOUT COMPLICATION: Chronic | ICD-10-CM

## 2024-09-23 DIAGNOSIS — F90.0 ATTENTION DEFICIT HYPERACTIVITY DISORDER (ADHD), PREDOMINANTLY INATTENTIVE TYPE: Chronic | ICD-10-CM

## 2024-09-24 RX ORDER — FLUTICASONE FUROATE AND VILANTEROL 100; 25 UG/1; UG/1
1 POWDER RESPIRATORY (INHALATION) DAILY
Qty: 2 EACH | Refills: 6 | Status: SHIPPED | OUTPATIENT
Start: 2024-09-24

## 2024-09-24 RX ORDER — DEXTROAMPHETAMINE SACCHARATE, AMPHETAMINE ASPARTATE MONOHYDRATE, DEXTROAMPHETAMINE SULFATE AND AMPHETAMINE SULFATE 5; 5; 5; 5 MG/1; MG/1; MG/1; MG/1
20 CAPSULE, EXTENDED RELEASE ORAL EVERY MORNING
Qty: 30 CAPSULE | Refills: 0 | Status: SHIPPED | OUTPATIENT
Start: 2024-09-24 | End: 2024-10-24

## 2024-09-24 NOTE — TELEPHONE ENCOUNTER
Received request via: Patient    Was the patient seen in the last year in this department? Yes    Does the patient have an active prescription (recently filled or refills available) for medication(s) requested? No    Ph[facility] Name: [facility]     Does the patient have [facility] Plus and need 100-day supply? (This applies to ALL medications) Patient does not have SCP

## 2024-10-22 ENCOUNTER — OFFICE VISIT (OUTPATIENT)
Dept: MEDICAL GROUP | Facility: PHYSICIAN GROUP | Age: 34
End: 2024-10-22
Payer: COMMERCIAL

## 2024-10-22 VITALS
HEIGHT: 69 IN | DIASTOLIC BLOOD PRESSURE: 82 MMHG | WEIGHT: 244 LBS | HEART RATE: 91 BPM | BODY MASS INDEX: 36.14 KG/M2 | SYSTOLIC BLOOD PRESSURE: 120 MMHG | TEMPERATURE: 98.5 F | OXYGEN SATURATION: 97 %

## 2024-10-22 DIAGNOSIS — E66.3 OVERWEIGHT: Chronic | ICD-10-CM

## 2024-10-22 DIAGNOSIS — E78.5 DYSLIPIDEMIA: Chronic | ICD-10-CM

## 2024-10-22 DIAGNOSIS — M10.9 GOUT, UNSPECIFIED CAUSE, UNSPECIFIED CHRONICITY, UNSPECIFIED SITE: Chronic | ICD-10-CM

## 2024-10-22 DIAGNOSIS — K51.90 ULCERATIVE COLITIS WITHOUT COMPLICATIONS, UNSPECIFIED LOCATION (HCC): Chronic | ICD-10-CM

## 2024-10-22 DIAGNOSIS — J45.20 MILD INTERMITTENT ASTHMA WITHOUT COMPLICATION: Chronic | ICD-10-CM

## 2024-10-22 DIAGNOSIS — Z23 NEED FOR VACCINATION: Chronic | ICD-10-CM

## 2024-10-22 DIAGNOSIS — F90.0 ATTENTION DEFICIT HYPERACTIVITY DISORDER (ADHD), PREDOMINANTLY INATTENTIVE TYPE: Chronic | ICD-10-CM

## 2024-10-22 PROBLEM — M76.61 TENDONITIS, ACHILLES, RIGHT: Status: RESOLVED | Noted: 2024-02-05 | Resolved: 2024-10-22

## 2024-10-22 PROCEDURE — 99214 OFFICE O/P EST MOD 30 MIN: CPT | Mod: 25 | Performed by: INTERNAL MEDICINE

## 2024-10-22 PROCEDURE — 90471 IMMUNIZATION ADMIN: CPT | Performed by: INTERNAL MEDICINE

## 2024-10-22 PROCEDURE — 3079F DIAST BP 80-89 MM HG: CPT | Performed by: INTERNAL MEDICINE

## 2024-10-22 PROCEDURE — 90746 HEPB VACCINE 3 DOSE ADULT IM: CPT | Mod: JZ | Performed by: INTERNAL MEDICINE

## 2024-10-22 PROCEDURE — 3074F SYST BP LT 130 MM HG: CPT | Performed by: INTERNAL MEDICINE

## 2024-10-22 RX ORDER — DEXTROAMPHETAMINE SACCHARATE, AMPHETAMINE ASPARTATE MONOHYDRATE, DEXTROAMPHETAMINE SULFATE AND AMPHETAMINE SULFATE 5; 5; 5; 5 MG/1; MG/1; MG/1; MG/1
20 CAPSULE, EXTENDED RELEASE ORAL EVERY MORNING
Qty: 30 CAPSULE | Refills: 0 | Status: SHIPPED | OUTPATIENT
Start: 2024-10-22 | End: 2024-11-21

## 2024-10-22 ASSESSMENT — FIBROSIS 4 INDEX: FIB4 SCORE: 0.41

## 2024-11-24 DIAGNOSIS — F90.0 ATTENTION DEFICIT HYPERACTIVITY DISORDER (ADHD), PREDOMINANTLY INATTENTIVE TYPE: Chronic | ICD-10-CM

## 2024-11-25 RX ORDER — DEXTROAMPHETAMINE SACCHARATE, AMPHETAMINE ASPARTATE MONOHYDRATE, DEXTROAMPHETAMINE SULFATE AND AMPHETAMINE SULFATE 5; 5; 5; 5 MG/1; MG/1; MG/1; MG/1
20 CAPSULE, EXTENDED RELEASE ORAL EVERY MORNING
Qty: 30 CAPSULE | Refills: 0 | Status: SHIPPED | OUTPATIENT
Start: 2024-11-25 | End: 2024-12-25

## 2024-11-25 NOTE — TELEPHONE ENCOUNTER
Received request via: Patient    Was the patient seen in the last year in this department? Yes    Does the patient have an active prescription (recently filled or refills available) for medication(s) requested? No        Does the patient have [facility] Plus and need 100-day supply? (This applies to ALL medications) Patient does not have SCP

## 2024-12-31 DIAGNOSIS — F90.9 ATTENTION DEFICIT HYPERACTIVITY DISORDER (ADHD), UNSPECIFIED ADHD TYPE: Chronic | ICD-10-CM

## 2024-12-31 RX ORDER — DEXTROAMPHETAMINE SACCHARATE, AMPHETAMINE ASPARTATE, DEXTROAMPHETAMINE SULFATE AND AMPHETAMINE SULFATE 5; 5; 5; 5 MG/1; MG/1; MG/1; MG/1
20 TABLET ORAL DAILY
Qty: 30 TABLET | Refills: 0 | Status: SHIPPED | OUTPATIENT
Start: 2024-12-31 | End: 2025-01-30

## 2024-12-31 RX ORDER — DEXTROAMPHETAMINE SACCHARATE, AMPHETAMINE ASPARTATE, DEXTROAMPHETAMINE SULFATE AND AMPHETAMINE SULFATE 5; 5; 5; 5 MG/1; MG/1; MG/1; MG/1
TABLET ORAL
COMMUNITY
End: 2024-12-31 | Stop reason: SDUPTHER

## 2024-12-31 RX ORDER — DEXTROAMPHETAMINE SACCHARATE, AMPHETAMINE ASPARTATE, DEXTROAMPHETAMINE SULFATE AND AMPHETAMINE SULFATE 5; 5; 5; 5 MG/1; MG/1; MG/1; MG/1
TABLET ORAL
Qty: 60 TABLET | OUTPATIENT
Start: 2024-12-31

## 2024-12-31 NOTE — TELEPHONE ENCOUNTER
Received request via: Patient    Was the patient seen in the last year in this department? Yes    Does the patient have an active prescription (recently filled or refills available) for medication(s) requested? No    [facility]    Does the patient have [facility] and need 100-day supply? (This applies to ALL medications) Patient does not have SCP    DECEMBER IS REFILL #3

## 2025-01-21 ENCOUNTER — APPOINTMENT (OUTPATIENT)
Dept: MEDICAL GROUP | Facility: PHYSICIAN GROUP | Age: 35
End: 2025-01-21
Payer: COMMERCIAL

## 2025-02-13 ENCOUNTER — APPOINTMENT (OUTPATIENT)
Dept: MEDICAL GROUP | Facility: PHYSICIAN GROUP | Age: 35
End: 2025-02-13
Payer: COMMERCIAL

## 2025-02-15 ENCOUNTER — OFFICE VISIT (OUTPATIENT)
Dept: URGENT CARE | Facility: PHYSICIAN GROUP | Age: 35
End: 2025-02-15
Payer: COMMERCIAL

## 2025-02-15 ENCOUNTER — RESULTS FOLLOW-UP (OUTPATIENT)
Dept: URGENT CARE | Facility: PHYSICIAN GROUP | Age: 35
End: 2025-02-15

## 2025-02-15 VITALS
SYSTOLIC BLOOD PRESSURE: 128 MMHG | WEIGHT: 244.6 LBS | BODY MASS INDEX: 36.23 KG/M2 | HEART RATE: 113 BPM | TEMPERATURE: 98.8 F | HEIGHT: 69 IN | DIASTOLIC BLOOD PRESSURE: 84 MMHG | OXYGEN SATURATION: 97 % | RESPIRATION RATE: 15 BRPM

## 2025-02-15 DIAGNOSIS — R68.89 FLU-LIKE SYMPTOMS: ICD-10-CM

## 2025-02-15 DIAGNOSIS — J10.1 INFLUENZA A: ICD-10-CM

## 2025-02-15 LAB
FLUAV RNA SPEC QL NAA+PROBE: POSITIVE
FLUBV RNA SPEC QL NAA+PROBE: NEGATIVE
RSV RNA SPEC QL NAA+PROBE: NEGATIVE
SARS-COV-2 RNA RESP QL NAA+PROBE: NEGATIVE

## 2025-02-15 PROCEDURE — 3074F SYST BP LT 130 MM HG: CPT

## 2025-02-15 PROCEDURE — 99213 OFFICE O/P EST LOW 20 MIN: CPT

## 2025-02-15 PROCEDURE — 3079F DIAST BP 80-89 MM HG: CPT

## 2025-02-15 PROCEDURE — 0241U POCT CEPHEID COV-2, FLU A/B, RSV - PCR: CPT

## 2025-02-15 RX ORDER — OSELTAMIVIR PHOSPHATE 75 MG/1
75 CAPSULE ORAL 2 TIMES DAILY
Qty: 10 CAPSULE | Refills: 0 | Status: SHIPPED
Start: 2025-02-15 | End: 2025-02-24

## 2025-02-15 ASSESSMENT — ENCOUNTER SYMPTOMS
DIARRHEA: 0
SINUS PAIN: 0
EYE REDNESS: 0
NECK PAIN: 0
VOMITING: 0
WEAKNESS: 0
WHEEZING: 0
SORE THROAT: 1
HEADACHES: 1
COUGH: 1
SPUTUM PRODUCTION: 0
FEVER: 0
SHORTNESS OF BREATH: 0
NAUSEA: 0
CHILLS: 1

## 2025-02-15 ASSESSMENT — FIBROSIS 4 INDEX: FIB4 SCORE: 0.41

## 2025-02-15 NOTE — LETTER
[facility] [address] [address]     February 15, 2025    Patient: Ghassan Araujo   YOB: 1990   Date of Visit: 2/15/2025       To Whom It May Concern:    Ghassan Araujo was seen and treated in our department on 2/15/2025. Please excuse absence from work due to illness from 2/14/25 through 2/15/25.    Sincerely,     LATOYA Hunter.

## 2025-02-15 NOTE — LETTER
February 15, 2025         Patient: Ghassan Araujo   YOB: 1990   Date of Visit: 2/15/2025           To Whom it May Concern:    Ghassan Araujo was seen in my clinic on 2/15/2025. Please excuse absence from school due to illness from 2/15/25 through 2/18/25.  He may return sooner if he's feeling better.    If you have any questions or concerns, please don't hesitate to call.        Sincerely,       LATOYA Hunter.  Electronically Signed

## 2025-02-15 NOTE — PROGRESS NOTES
"Subjective     Ghassan Araujo is a 34 y.o. male who presents with Influenza (X3days needs dr note)            Influenza  This is a new problem. Episode onset: 2. The problem has been gradually worsening. Associated symptoms include chills, congestion, coughing, headaches and a sore throat. Pertinent negatives include no chest pain, fever, nausea, neck pain, rash, vomiting or weakness. Nothing aggravates the symptoms. Treatments tried: Dayquil. The treatment provided mild relief.       Review of Systems   Constitutional:  Positive for chills. Negative for fever.   HENT:  Positive for congestion and sore throat. Negative for ear discharge, ear pain and sinus pain.    Eyes:  Negative for redness.   Respiratory:  Positive for cough. Negative for sputum production, shortness of breath and wheezing.    Cardiovascular:  Negative for chest pain.   Gastrointestinal:  Negative for diarrhea, nausea and vomiting.   Musculoskeletal:  Negative for neck pain.   Skin:  Negative for rash.   Neurological:  Positive for headaches. Negative for weakness.              Objective     /84 (BP Location: Left arm, Patient Position: Sitting, BP Cuff Size: Adult)   Pulse (!) 113   Temp 37.1 °C (98.8 °F) (Temporal)   Resp 15   Ht 1.753 m (5' 9\")   Wt 111 kg (244 lb 9.6 oz)   SpO2 97%   BMI 36.12 kg/m²      Physical Exam  Constitutional:       General: He is not in acute distress.     Appearance: Normal appearance. He is obese. He is not ill-appearing.   HENT:      Head: Normocephalic and atraumatic.      Right Ear: Tympanic membrane, ear canal and external ear normal.      Left Ear: Tympanic membrane, ear canal and external ear normal.      Nose: Congestion and rhinorrhea present.      Mouth/Throat:      Mouth: Mucous membranes are moist.      Pharynx: Oropharynx is clear. Uvula midline. No pharyngeal swelling, oropharyngeal exudate or posterior oropharyngeal erythema.      Tonsils: No tonsillar exudate or tonsillar " abscesses. 1+ on the right. 1+ on the left.   Eyes:      Extraocular Movements: Extraocular movements intact.      Pupils: Pupils are equal, round, and reactive to light.   Cardiovascular:      Rate and Rhythm: Regular rhythm. Tachycardia present.   Pulmonary:      Effort: Pulmonary effort is normal. No respiratory distress.      Breath sounds: Normal breath sounds. No stridor. No wheezing or rhonchi.   Musculoskeletal:      Cervical back: Normal range of motion and neck supple. No rigidity.   Lymphadenopathy:      Cervical: No cervical adenopathy.   Skin:     General: Skin is warm and dry.   Neurological:      General: No focal deficit present.      Mental Status: He is alert and oriented to person, place, and time. Mental status is at baseline.                                  Assessment & Plan  Influenza A    Orders:    oseltamivir (TAMIFLU) 75 MG Cap; Take 1 Capsule by mouth 2 times a day.    Flu-like symptoms    Orders:    POCT CEPHEID COV-2, FLU A/B, RSV - PCR       Results for orders placed or performed in visit on 02/15/25   POCT CEPHEID COV-2, FLU A/B, RSV - PCR    Collection Time: 02/15/25 12:45 PM   Result Value Ref Range    SARS-CoV-2 by PCR Negative Negative, Invalid    Influenza virus A RNA Positive (A) Negative, Invalid    Influenza virus B, PCR Negative Negative, Invalid    RSV, PCR Negative Negative, Invalid        This is an acute condition.  Previous visits, past medical history, medications, and vital signs reviewed.  Patient not acutely ill-appearing, in acute distress.  Tachycardic in 110s, asymptomatic.  Afebrile, not hypoxic.  Lung sounds clear, no wheezing or rhonchi.  No concern for acute bacterial ENT infection at this time.  Symptoms likely viral in etiology.  Will rule out viral panel.  Patient is within the treatment window for antiviral medication if appropriate, and is requesting medication.  It was explained today that due to the viral nature of the patient's illness, we will treat  symptomatically today. Discussed that symptoms do not warrant antibiotics.    Encouraged to continue OTC supportive meds PRN. Humidification, increased fluids intake, adding electrolytes to diet, gargling salt water, drinking warm liquids with honey, OTC throat spray, and rest also discussed.   Discussed side effects and interactions of OTC meds and any prescribed.  Patient instructed to return to clinic if symptoms do not improve or worsen within 5-7 days.  Also informed that if chest pain or increased shortness of breath develop to immediately follow up for evaluation in the ED. Advised of risks of not doing so.    The patient demonstrated a good understanding and agreed with the treatment plan. Denies further questions.       Please note that this dictation was created using voice recognition software. I have made every reasonable attempt to correct obvious errors, but I expect that there are errors of grammar and possibly content that I did not discover before finalizing the note.

## 2025-02-20 RX ORDER — ALLOPURINOL 300 MG/1
TABLET ORAL
COMMUNITY
End: 2025-02-24

## 2025-02-20 RX ORDER — FLUTICASONE FUROATE AND VILANTEROL 100; 25 UG/1; UG/1
POWDER RESPIRATORY (INHALATION)
COMMUNITY
End: 2025-02-24

## 2025-02-21 ENCOUNTER — HOSPITAL ENCOUNTER (OUTPATIENT)
Dept: LAB | Facility: MEDICAL CENTER | Age: 35
End: 2025-02-21
Attending: NURSE PRACTITIONER
Payer: COMMERCIAL

## 2025-02-21 ENCOUNTER — HOSPITAL ENCOUNTER (OUTPATIENT)
Dept: LAB | Facility: MEDICAL CENTER | Age: 35
End: 2025-02-21
Attending: INTERNAL MEDICINE
Payer: COMMERCIAL

## 2025-02-21 DIAGNOSIS — E78.2 MIXED HYPERLIPIDEMIA: ICD-10-CM

## 2025-02-21 DIAGNOSIS — E66.3 OVERWEIGHT: Chronic | ICD-10-CM

## 2025-02-21 DIAGNOSIS — M10.9 GOUT, UNSPECIFIED CAUSE, UNSPECIFIED CHRONICITY, UNSPECIFIED SITE: Chronic | ICD-10-CM

## 2025-02-21 DIAGNOSIS — E78.5 DYSLIPIDEMIA: Chronic | ICD-10-CM

## 2025-02-21 LAB
ALT SERPL-CCNC: 102 U/L (ref 2–50)
ANION GAP SERPL CALC-SCNC: 12 MMOL/L (ref 7–16)
BUN SERPL-MCNC: 15 MG/DL (ref 8–22)
CALCIUM SERPL-MCNC: 9.4 MG/DL (ref 8.5–10.5)
CHLORIDE SERPL-SCNC: 101 MMOL/L (ref 96–112)
CHOLEST SERPL-MCNC: 174 MG/DL (ref 100–199)
CHOLEST SERPL-MCNC: 178 MG/DL (ref 100–199)
CO2 SERPL-SCNC: 26 MMOL/L (ref 20–33)
CREAT SERPL-MCNC: 0.95 MG/DL (ref 0.5–1.4)
FASTING STATUS PATIENT QL REPORTED: NORMAL
FASTING STATUS PATIENT QL REPORTED: NORMAL
GFR SERPLBLD CREATININE-BSD FMLA CKD-EPI: 108 ML/MIN/1.73 M 2
GLUCOSE SERPL-MCNC: 100 MG/DL (ref 65–99)
HDLC SERPL-MCNC: 28 MG/DL
HDLC SERPL-MCNC: 29 MG/DL
LDLC SERPL CALC-MCNC: 97 MG/DL
LDLC SERPL CALC-MCNC: 99 MG/DL
POTASSIUM SERPL-SCNC: 4.5 MMOL/L (ref 3.6–5.5)
SODIUM SERPL-SCNC: 139 MMOL/L (ref 135–145)
TRIGL SERPL-MCNC: 247 MG/DL (ref 0–149)
TRIGL SERPL-MCNC: 248 MG/DL (ref 0–149)
URATE SERPL-MCNC: 8.2 MG/DL (ref 2.5–8.3)

## 2025-02-21 PROCEDURE — 84460 ALANINE AMINO (ALT) (SGPT): CPT

## 2025-02-21 PROCEDURE — 80061 LIPID PANEL: CPT

## 2025-02-21 PROCEDURE — 80061 LIPID PANEL: CPT | Mod: 91

## 2025-02-21 PROCEDURE — 80048 BASIC METABOLIC PNL TOTAL CA: CPT

## 2025-02-21 PROCEDURE — 84550 ASSAY OF BLOOD/URIC ACID: CPT

## 2025-02-21 PROCEDURE — 36415 COLL VENOUS BLD VENIPUNCTURE: CPT

## 2025-02-21 PROCEDURE — 82172 ASSAY OF APOLIPOPROTEIN: CPT

## 2025-02-22 ENCOUNTER — RESULTS FOLLOW-UP (OUTPATIENT)
Dept: MEDICAL GROUP | Facility: PHYSICIAN GROUP | Age: 35
End: 2025-02-22
Payer: COMMERCIAL

## 2025-02-22 DIAGNOSIS — R74.8 LIVER ENZYME ELEVATION: ICD-10-CM

## 2025-02-22 DIAGNOSIS — R73.03 PREDIABETES: ICD-10-CM

## 2025-02-23 LAB — APO B100 SERPL-MCNC: 116 MG/DL (ref 66–133)

## 2025-02-24 ENCOUNTER — HOSPITAL ENCOUNTER (OUTPATIENT)
Dept: LAB | Facility: MEDICAL CENTER | Age: 35
End: 2025-02-24
Attending: INTERNAL MEDICINE
Payer: COMMERCIAL

## 2025-02-24 ENCOUNTER — APPOINTMENT (OUTPATIENT)
Dept: MEDICAL GROUP | Facility: PHYSICIAN GROUP | Age: 35
End: 2025-02-24
Payer: COMMERCIAL

## 2025-02-24 VITALS
OXYGEN SATURATION: 98 % | WEIGHT: 248 LBS | DIASTOLIC BLOOD PRESSURE: 62 MMHG | HEART RATE: 96 BPM | SYSTOLIC BLOOD PRESSURE: 126 MMHG | TEMPERATURE: 98.8 F | BODY MASS INDEX: 36.73 KG/M2 | HEIGHT: 69 IN | RESPIRATION RATE: 16 BRPM

## 2025-02-24 DIAGNOSIS — E78.5 DYSLIPIDEMIA: Chronic | ICD-10-CM

## 2025-02-24 DIAGNOSIS — K51.90 ULCERATIVE COLITIS WITHOUT COMPLICATIONS, UNSPECIFIED LOCATION (HCC): Chronic | ICD-10-CM

## 2025-02-24 DIAGNOSIS — E66.3 OVERWEIGHT: Chronic | ICD-10-CM

## 2025-02-24 DIAGNOSIS — M10.9 GOUT, UNSPECIFIED CAUSE, UNSPECIFIED CHRONICITY, UNSPECIFIED SITE: ICD-10-CM

## 2025-02-24 DIAGNOSIS — R74.8 LIVER ENZYME ELEVATION: Chronic | ICD-10-CM

## 2025-02-24 DIAGNOSIS — R73.03 PREDIABETES: Chronic | ICD-10-CM

## 2025-02-24 DIAGNOSIS — I47.10 PSVT (PAROXYSMAL SUPRAVENTRICULAR TACHYCARDIA) (HCC): Chronic | ICD-10-CM

## 2025-02-24 DIAGNOSIS — J45.20 MILD INTERMITTENT ASTHMA WITHOUT COMPLICATION: Chronic | ICD-10-CM

## 2025-02-24 DIAGNOSIS — F90.9 ATTENTION DEFICIT HYPERACTIVITY DISORDER (ADHD), UNSPECIFIED ADHD TYPE: Chronic | ICD-10-CM

## 2025-02-24 LAB
ALBUMIN SERPL BCP-MCNC: 4.7 G/DL (ref 3.2–4.9)
ALP SERPL-CCNC: 89 U/L (ref 30–99)
ALT SERPL-CCNC: 68 U/L (ref 2–50)
ALT SERPL-CCNC: 71 U/L (ref 2–50)
AST SERPL-CCNC: 32 U/L (ref 12–45)
BILIRUB CONJ SERPL-MCNC: <0.2 MG/DL (ref 0.1–0.5)
BILIRUB INDIRECT SERPL-MCNC: ABNORMAL MG/DL (ref 0–1)
BILIRUB SERPL-MCNC: 0.4 MG/DL (ref 0.1–1.5)
HBV SURFACE AB SERPL IA-ACNC: 126 MIU/ML (ref 0–10)
HBV SURFACE AG SER QL: NORMAL
HCV AB SER QL: NORMAL
PROT SERPL-MCNC: 7.8 G/DL (ref 6–8.2)

## 2025-02-24 PROCEDURE — 86803 HEPATITIS C AB TEST: CPT

## 2025-02-24 PROCEDURE — 99214 OFFICE O/P EST MOD 30 MIN: CPT | Performed by: INTERNAL MEDICINE

## 2025-02-24 PROCEDURE — 80076 HEPATIC FUNCTION PANEL: CPT

## 2025-02-24 PROCEDURE — 86706 HEP B SURFACE ANTIBODY: CPT

## 2025-02-24 PROCEDURE — 36415 COLL VENOUS BLD VENIPUNCTURE: CPT

## 2025-02-24 PROCEDURE — 84460 ALANINE AMINO (ALT) (SGPT): CPT

## 2025-02-24 PROCEDURE — 87340 HEPATITIS B SURFACE AG IA: CPT

## 2025-02-24 PROCEDURE — 3078F DIAST BP <80 MM HG: CPT | Performed by: INTERNAL MEDICINE

## 2025-02-24 PROCEDURE — 3074F SYST BP LT 130 MM HG: CPT | Performed by: INTERNAL MEDICINE

## 2025-02-24 RX ORDER — ALLOPURINOL 300 MG/1
300 TABLET ORAL DAILY
Qty: 90 TABLET | Refills: 3 | Status: SHIPPED | OUTPATIENT
Start: 2025-02-24

## 2025-02-24 RX ORDER — DEXTROAMPHETAMINE SACCHARATE, AMPHETAMINE ASPARTATE MONOHYDRATE, DEXTROAMPHETAMINE SULFATE AND AMPHETAMINE SULFATE 5; 5; 5; 5 MG/1; MG/1; MG/1; MG/1
CAPSULE, EXTENDED RELEASE ORAL
COMMUNITY
Start: 2025-02-05

## 2025-02-24 ASSESSMENT — FIBROSIS 4 INDEX: FIB4 SCORE: 0.24

## 2025-02-24 ASSESSMENT — PATIENT HEALTH QUESTIONNAIRE - PHQ9: CLINICAL INTERPRETATION OF PHQ2 SCORE: 0

## 2025-02-24 NOTE — PROGRESS NOTES
PRIMARY CARE CLINIC VISIT        Chief Complaint   Patient presents with    Follow-Up    Lab Results      Enzyme elevation  Count  Attention deficit  PSVT  Hyperlipidemia  Asthma  Ulcerative colitis  Overweight  Prediabetes          History of Present Illness     ADHD  This is a chronic condition.  Patient presently taking Adderall XR 20 Mg daily.  Patient tolerating the treatment well.    PSVT (paroxysmal supraventricular tachycardia) (HCC)  Chronic condition.  Status post ablation therapy.  Followed by cardiology service.  Patient presently asymptomatic.    Dyslipidemia  Chronic condition.  Recent lab test result discussed with the patient.  Patient presently taking rosuvastatin 10 mg daily.    Liver enzyme elevation  New condition.  Blood test showed ALT elevated at 102.  Patient asymptomatic.  Patient stated that he drinks beer on occasion.  Denies fever chills jaundice or abdominal pain.    Asthma  Chronic.  The patient uses Breo Ellipta daily and albuterol as needed.  The patient asymptomatic.    Ulcerative colitis (HCC)  This is a chronic condition.  Followed by GI service.  Patient is scheduled to have colonoscopy to be done.    Overweight  Body mass index is 36.62 kg/m².   Chronic condition.  Recommend pt to follow a healthy , well balance diet  Pt to continue with regular exercise activity and to maintain ideal weight.        Gout  Chronic.  Patient takes allopurinol daily.  The patient is requesting Rx refill.    Prediabetes  This is a chronic condition.  The patient currently on diet therapy.    Current Outpatient Medications on File Prior to Visit   Medication Sig Dispense Refill    amphetamine-dextroamphetamine (ADDERALL XR) 20 MG per XR capsule       fluticasone furoate-vilanterol (BREO ELLIPTA) 100-25 MCG/ACT AEROSOL POWDER, BREATH ACTIVATED Inhale 1 Puff every day. 2 Each 6    rosuvastatin (CRESTOR) 10 MG Tab Take 1 Tablet by mouth every evening. 100 Tablet 3    mesalamine (LIALDA) 1.2 GM Tablet  Delayed Response       albuterol 108 (90 Base) MCG/ACT Aero Soln inhalation aerosol Inhale 2 Puffs every 6 hours as needed for Shortness of Breath. 1 Each 2    PROBIOTIC PRODUCT PO Take  by mouth every day.       No current facility-administered medications on file prior to visit.        Allergies: Methotrexate and Penicillins    Current Outpatient Medications Ordered in Epic   Medication Sig Dispense Refill    amphetamine-dextroamphetamine (ADDERALL XR) 20 MG per XR capsule       allopurinol (ZYLOPRIM) 300 MG Tab Take 1 Tablet by mouth every day. 90 Tablet 3    fluticasone furoate-vilanterol (BREO ELLIPTA) 100-25 MCG/ACT AEROSOL POWDER, BREATH ACTIVATED Inhale 1 Puff every day. 2 Each 6    rosuvastatin (CRESTOR) 10 MG Tab Take 1 Tablet by mouth every evening. 100 Tablet 3    mesalamine (LIALDA) 1.2 GM Tablet Delayed Response       albuterol 108 (90 Base) MCG/ACT Aero Soln inhalation aerosol Inhale 2 Puffs every 6 hours as needed for Shortness of Breath. 1 Each 2    PROBIOTIC PRODUCT PO Take  by mouth every day.       No current [facility]-ordered facility-administered medications on file.       History reviewed. No pertinent past medical history.    Past Surgical History:   Procedure Laterality Date    HAND SURGERY Right 2003       Family History   Problem Relation Age of Onset    Cancer Maternal Grandmother         uterine    Heart Disease Maternal Grandfather     Heart Disease Paternal Grandmother     Cancer Paternal Grandfather         Lymphoma       Social History     Tobacco Use   Smoking Status Former    Current packs/day: 0.00    Types: Cigarettes    Quit date: 10/4/2021    Years since quitting: 3.3   Smokeless Tobacco Current    Types: Snuff, Chew   Tobacco Comments    Nicotine pouches       Social History     Substance and Sexual Activity   Alcohol Use Yes    Comment: occ       Review of systems  As per HPI above. All other systems reviewed and negative.      Past Medical, Social, and Family history reviewed and  "updated in [facility]       LAB DATA:     I have independently reviewed / interpreted labs    Lab Results   Component Value Date/Time    HBA1C 5.2 11/21/2023 09:07 AM    HBA1C 5.0 02/01/2023 08:58 AM        Lab Results   Component Value Date/Time    WBC 6.8 11/21/2023 09:07 AM    HEMOGLOBIN 16.0 11/21/2023 09:07 AM    HEMATOCRIT 46.4 11/21/2023 09:07 AM    MCV 88.2 11/21/2023 09:07 AM    PLATELETCT 281 11/21/2023 09:07 AM       Lab Results   Component Value Date/Time    SODIUM 139 02/21/2025 10:40 AM    POTASSIUM 4.5 02/21/2025 10:40 AM    GLUCOSE 100 (H) 02/21/2025 10:40 AM    BUN 15 02/21/2025 10:40 AM    CREATININE 0.95 02/21/2025 10:40 AM       Lab Results   Component Value Date/Time    CHOLSTRLTOT 178 02/21/2025 10:40 AM    CHOLSTRLTOT 174 02/21/2025 10:40 AM    TRIGLYCERIDE 248 (H) 02/21/2025 10:40 AM    TRIGLYCERIDE 247 (H) 02/21/2025 10:40 AM    HDL 29 (A) 02/21/2025 10:40 AM    HDL 28 (A) 02/21/2025 10:40 AM    LDL 99 02/21/2025 10:40 AM    LDL 97 02/21/2025 10:40 AM       Lab Results   Component Value Date/Time    ALTSGPT 102 (H) 02/21/2025 10:40 AM          Objective     /62 (BP Location: Left arm, Patient Position: Sitting, BP Cuff Size: Large adult)   Pulse 96   Temp 37.1 °C (98.8 °F) (Temporal)   Resp 16   Ht 1.753 m (5' 9\")   Wt 112 kg (248 lb)   SpO2 98%    Body mass index is 36.62 kg/m².    General: alert in no apparent distress.  Cardiovascular: regular rate and rhythm  Pulmonary: lungs : no wheezing   Gastrointestinal: BS present.  No rebound guarding or rigidity noted.  No obvious mass palpable      Assessment and Plan     1. Liver enzyme elevation  This is a new condition.  Patient asymptomatic.  Exact cause unclear.  Rule out possible fatty liver versus others.  Recommend  - HEP C VIRUS ANTIBODY; Future  - HEPATIC FUNCTION PANEL; Future  - HEP B SURFACE ANTIGEN; Future  - HEP B SURFACE AB; Future  - Referral to Gastroenterology  - US-RUQ; Future  The patient to avoid EtOH.    2. Gout, " unspecified cause, unspecified chronicity, unspecified site  - allopurinol (ZYLOPRIM) 300 MG Tab; Take 1 Tablet by mouth every day.  Dispense: 90 Tablet; Refill: 3  Chronic stable.  Continue allopurinol 300 mg daily    3. Attention deficit hyperactivity disorder (ADHD), unspecified ADHD type  This is a chronic and stable condition.  Continue Adderall 20 Mg daily    4. PSVT (paroxysmal supraventricular tachycardia) (HCC)  Chronic condition.  Status post ablation.  Patient to follow-up with cardiology service as directed    5. Dyslipidemia  Chronic condition.  Uncontrolled.  Triglyceride level elevated.  Additional medication such as fenofibrate not recommended due to patient with elevated liver enzyme.  Continue with diet and lifestyle modification.  Continue rosuvastatin 10 mg daily.    6. Mild intermittent asthma without complication  Chronic stable.  Continue Breo Ellipta 1 puff daily and to use albuterol as needed    7. Ulcerative colitis without complications, unspecified location (HCC)  Chronic stable.  Continue Lialda 1.2 g daily.  Continue follow-up with GI service    8. Overweight  Chronic condition for uncontrolled per recommend diet and lifestyle modification.  Courage patient to lose weight    9. Prediabetes  Chronic condition.  Recommend diet and exercise.  Continue to monitor lab              Please note that this dictation was created using voice recognition software. I have made every reasonable attempt to correct obvious errors, but I expect that there are errors of grammar and possibly content that I did not discover before finalizing the note.    Tee Gonzales MD  Internal Medicine  [facility]

## 2025-02-24 NOTE — ASSESSMENT & PLAN NOTE
Chronic condition.  Status post ablation therapy.  Followed by cardiology service.  Patient presently asymptomatic.

## 2025-02-24 NOTE — ASSESSMENT & PLAN NOTE
New condition.  Blood test showed ALT elevated at 102.  Patient asymptomatic.  Patient stated that he drinks beer on occasion.  Denies fever chills jaundice or abdominal pain.

## 2025-02-24 NOTE — ASSESSMENT & PLAN NOTE
Chronic condition.  Recent lab test result discussed with the patient.  Patient presently taking rosuvastatin 10 mg daily.

## 2025-02-24 NOTE — ASSESSMENT & PLAN NOTE
This is a chronic condition.  Followed by GI service.  Patient is scheduled to have colonoscopy to be done.

## 2025-02-24 NOTE — ASSESSMENT & PLAN NOTE
Body mass index is 36.62 kg/m².   Chronic condition.  Recommend pt to follow a healthy , well balance diet  Pt to continue with regular exercise activity and to maintain ideal weight.

## 2025-02-24 NOTE — ASSESSMENT & PLAN NOTE
This is a chronic condition.  Patient presently taking Adderall XR 20 Mg daily.  Patient tolerating the treatment well.

## 2025-02-25 ENCOUNTER — RESULTS FOLLOW-UP (OUTPATIENT)
Dept: MEDICAL GROUP | Facility: PHYSICIAN GROUP | Age: 35
End: 2025-02-25
Payer: COMMERCIAL

## 2025-02-25 ENCOUNTER — OFFICE VISIT (OUTPATIENT)
Dept: VASCULAR LAB | Facility: MEDICAL CENTER | Age: 35
End: 2025-02-25
Attending: NURSE PRACTITIONER
Payer: COMMERCIAL

## 2025-02-25 VITALS
WEIGHT: 242.6 LBS | DIASTOLIC BLOOD PRESSURE: 78 MMHG | HEART RATE: 94 BPM | BODY MASS INDEX: 35.93 KG/M2 | HEIGHT: 69 IN | SYSTOLIC BLOOD PRESSURE: 118 MMHG

## 2025-02-25 DIAGNOSIS — E78.5 DYSLIPIDEMIA: Chronic | ICD-10-CM

## 2025-02-25 DIAGNOSIS — E78.2 MIXED HYPERLIPIDEMIA: ICD-10-CM

## 2025-02-25 DIAGNOSIS — E66.3 OVERWEIGHT: Chronic | ICD-10-CM

## 2025-02-25 DIAGNOSIS — R73.03 PREDIABETES: Chronic | ICD-10-CM

## 2025-02-25 DIAGNOSIS — R74.8 LIVER ENZYME ELEVATION: Chronic | ICD-10-CM

## 2025-02-25 PROCEDURE — 99212 OFFICE O/P EST SF 10 MIN: CPT

## 2025-02-25 PROCEDURE — 3078F DIAST BP <80 MM HG: CPT | Performed by: NURSE PRACTITIONER

## 2025-02-25 PROCEDURE — 99214 OFFICE O/P EST MOD 30 MIN: CPT | Performed by: NURSE PRACTITIONER

## 2025-02-25 PROCEDURE — 3074F SYST BP LT 130 MM HG: CPT | Performed by: NURSE PRACTITIONER

## 2025-02-25 ASSESSMENT — ENCOUNTER SYMPTOMS
CLAUDICATION: 0
SPUTUM PRODUCTION: 0
PALPITATIONS: 0
WHEEZING: 0
PND: 0
HEMOPTYSIS: 0
COUGH: 0
FEVER: 0
ORTHOPNEA: 0
SHORTNESS OF BREATH: 0
CHILLS: 0

## 2025-02-25 ASSESSMENT — FIBROSIS 4 INDEX: FIB4 SCORE: 0.46

## 2025-02-25 NOTE — PROGRESS NOTES
[facility] - follow-up  02/25/2025    Ghassan Araujo has been referred for evaluation and management of dyslipidemia  Est 10/2023    Referral Source: Sean Bang M.D.     Subjective   Interval hx/concerns: last seen 08/2024  Tolerating rosuvastatin   Recently had flu-- took large amounts of Dayquil  Liver enzymes are a bit elevated-- PCP working up  Has not been working out or maintaining healthy diet  He is however motivated to make lifestyle changes that are manageable     LIFESTYLE MGMT:  Change in weight: up 9 lbs   Body mass index is 35.83 kg/m².  Wt Readings from Last 3 Encounters:   02/25/25 110 kg (242 lb 9.6 oz)   02/24/25 112 kg (248 lb)   02/15/25 111 kg (244 lb 9.6 oz)    Exercise habits: minimal exercise   Dietary patterns: Medi diet - strict   Etoh: see sochx  Reported barriers to care: none    MEDICATION MGMT:  Current Prescription Lipid Lowering Medications - including dose:   Statin: None    Non-Statin: None  Current Lipid Lowering and Related Supplements: None  Any Current Side Effects Potentially Related to Lipid Lowering therapy? No    Current Adherence to Lipid Lowering Therapies: Complete    PERTINENT HLD PMHX:   Initial visit hx:  ongoing mixed HLD Had prior negative ETT 3/2023  Age at Initial Diagnosis of Dyslipidemia:  30s  Baseline Lipids Prior to Treatment:   Lab Results   Component Value Date/Time    CHOLSTRLTOT 178 02/21/2025 10:40 AM    CHOLSTRLTOT 174 02/21/2025 10:40 AM    LDL 99 02/21/2025 10:40 AM    LDL 97 02/21/2025 10:40 AM    HDL 29 (A) 02/21/2025 10:40 AM    HDL 28 (A) 02/21/2025 10:40 AM    TRIGLYCERIDE 248 (H) 02/21/2025 10:40 AM    TRIGLYCERIDE 247 (H) 02/21/2025 10:40 AM       History of ASCVD: None  Other Established (non-atherosclerotic) Vascular Disease, if Present: None  Secondary causes of dyslipidemia: none   Previously Attempted Interventions for Lipids - including outcome - none   Any Previous History of Statin Intolerance? N/a  "    ANTITHROMBOTIC THERAPY: No, no hx of bleeding    HTN: no dx or meds      Current Outpatient Medications:     amphetamine-dextroamphetamine, , Taking    allopurinol, 300 mg, Oral, DAILY, Taking    fluticasone furoate-vilanterol, 1 Puff, Inhalation, DAILY, Taking    rosuvastatin, 10 mg, Oral, Q EVENING, Taking    mesalamine, , Taking    albuterol, 2 Puff, Inhalation, Q6HRS PRN, PRN    PROBIOTIC PRODUCT PO, Take  by mouth every day., PRN    Social History     Tobacco Use    Smoking status: Former     Current packs/day: 0.00     Types: Cigarettes     Quit date: 10/4/2021     Years since quitting: 3.3    Smokeless tobacco: Current     Types: Snuff, Chew    Tobacco comments:     Nicotine pouches   Vaping Use    Vaping status: Former   Substance Use Topics    Alcohol use: Yes     Comment: occ    Drug use: Yes     Types: Marijuana     Comment: Occ     Review of Systems   Constitutional:  Negative for chills, fever and malaise/fatigue.   Respiratory:  Negative for cough, hemoptysis, sputum production, shortness of breath and wheezing.    Cardiovascular:  Negative for chest pain, palpitations, orthopnea, claudication, leg swelling and PND.       Objective    Vitals:    02/25/25 1427   BP: 118/78   Pulse: 94   Weight: 110 kg (242 lb 9.6 oz)   Height: 1.753 m (5' 9\")     BMI Readings from Last 1 Encounters:   02/25/25 35.83 kg/m²      BP Readings from Last 5 Encounters:   02/25/25 118/78   02/24/25 126/62   02/15/25 128/84   10/22/24 120/82   08/12/24 (!) 142/83     Physical Exam  Constitutional:       Appearance: Normal appearance. He is well-developed.   HENT:      Head: Normocephalic and atraumatic.   Eyes:      Conjunctiva/sclera: Conjunctivae normal.   Neck:      Thyroid: No thyromegaly.      Vascular: No JVD.   Cardiovascular:      Rate and Rhythm: Normal rate and regular rhythm.      Pulses:           Radial pulses are 2+ on the right side and 2+ on the left side.        Dorsalis pedis pulses are 2+ on the right side " and 2+ on the left side.        Posterior tibial pulses are 2+ on the right side and 2+ on the left side.      Heart sounds: Normal heart sounds. No murmur heard.     No friction rub. No gallop.   Pulmonary:      Effort: Pulmonary effort is normal. No respiratory distress.      Breath sounds: Normal breath sounds.   Musculoskeletal:      Cervical back: Normal range of motion and neck supple.   Lymphadenopathy:      Cervical: No cervical adenopathy.   Skin:     General: Skin is warm and dry.   Neurological:      General: No focal deficit present.      Mental Status: He is alert and oriented to person, place, and time.      Cranial Nerves: No cranial nerve deficit.       DATA REVIEW:  Most Recent Lipid Panel:   Lab Results   Component Value Date/Time    CHOLSTRLTOT 178 02/21/2025 10:40 AM    CHOLSTRLTOT 174 02/21/2025 10:40 AM    LDL 99 02/21/2025 10:40 AM    LDL 97 02/21/2025 10:40 AM    HDL 29 (A) 02/21/2025 10:40 AM    HDL 28 (A) 02/21/2025 10:40 AM    TRIGLYCERIDE 248 (H) 02/21/2025 10:40 AM    TRIGLYCERIDE 247 (H) 02/21/2025 10:40 AM      Latest Reference Range & Units 11/21/23 09:06   LDL Direct 0 - 129 mg/dL 131 (H)        Lab Results   Component Value Date/Time    LIPOPROTA <6 11/21/2023 09:07 AM      Lab Results   Component Value Date/Time    APOB 116 02/21/2025 10:40 AM        Latest Reference Range & Units 11/21/23 09:07   C Reactive Protein High Sensitive 0.0 - 3.0 mg/L 2.8     Other Pertinent Blood Work:   Lab Results   Component Value Date    SODIUM 139 02/21/2025    POTASSIUM 4.5 02/21/2025    CHLORIDE 101 02/21/2025    CO2 26 02/21/2025    ANION 12.0 02/21/2025    GLUCOSE 100 (H) 02/21/2025    BUN 15 02/21/2025    CREATININE 0.95 02/21/2025    CALCIUM 9.4 02/21/2025    ASTSGOT 32 02/24/2025    ALTSGPT 71 (H) 02/24/2025    ALTSGPT 68 (H) 02/24/2025    ALKPHOSPHAT 89 02/24/2025    TBILIRUBIN 0.4 02/24/2025    ALBUMIN 4.7 02/24/2025    AGRATIO 1.7 11/21/2023    CRPHIGHSEN 2.8 11/21/2023    LIPOPROTA <6  11/21/2023     VASCULAR IMAGING:    ETT 3/2023   Negative stress ECG for ischemia.    Exercise capacity very good at >10 METS.        ASSESSMENT AND PLAN  1. Mixed hyperlipidemia  Lipid Profile      2. Dyslipidemia        3. Overweight        4. Prediabetes        5. Liver enzyme elevation          Patient Type, check all that apply: Primary Prevention    Established Atherosclerotic Cardiovascular Disease (ASCVD): no    Other Established (non-atherosclerotic) Vascular Disease, if Present:     1) PSVT, s/p ablation - prior resting HR 90-100s, had improved   - ongoing care with cardiology     Evidence of genetic dyslipidemia (eg. Heterozygous Familial Hypercholesterolemia (HeFH)): Possible   Presumed dx:  FCHL based upon high LDL-C/high TG with hyperapoB/hyperTG phenotype (type IIb)  - most commonly inherited DLD (~1 in 200), generally polygenic often with significant famhx of DLD and premature ASCVD.  significant increased risk for premature CVD (>20% develop CHD <59yo) and requires aggressive lipid lowering interventions including diet and meds   FH genotyping: will consider in future     ACC/AHA Indication for Statin Therapy, greg all that apply:  Primary Prevention- 20-40yo    Calculated Risk for ASCVD, if applicable    The ASCVD Risk score (Jazmine DUPONT, et al., 2019) failed to calculate., N/A    Other Significant Risk Markers, if any, greg all that apply   hsCRP = 2.8 (average, upper limit normal)  Lp(a) <6    Risk-enhancers: Persistently elevated trigs >174    Goal LDL-C and nonHDL-C based on Clinic Protocol  LDL-C <100 (if HTG, consider apoB <90, non-HDL-C <130)  At goal? Yes, except elevated trigs 02/2025    LIFESTYLE INTERVENTIONS:    SMOKING: Stages of Change: Maintenance (sustained change >6mo)    reports that he quit smoking about 3 years ago. His smoking use included cigarettes. His smokeless tobacco use includes snuff and chew.   - continued complete avoidance of all tobacco products     PHYSICAL ACTIVITY:  increase daily activity with walking or activity such as hockey which he enjoys.  Goal is, targeting >150min/week of moderate-level activity or as much as tolerated in light of functional status and co-morbidities     WEIGHT MANAGEMENT AND NUTRITION: Mediterranean style dietary approach  and Triglyceride-lowering diet with reduced fat calories, reduced simple carbohydrates, reduce/avoid alcohol -- long discussion on making manageable, lasting changes to dietary choices as maintenance of weight loss will be difficult if he returns to previous habits.     LIPID LOWERING MEDICATION MANAGEMENT:     Statin Therapy Recommendations from Today’s Visit:    - continue rosuvastatin at 10mg every day     Non-Statin Medications Recommendations from Today’s Visit: none   - add zetia as next agent   - consider addition of prescription strength fish oil or fenofibrate once liver enzymes resolved     Indication for PCSK9 Inhibitor strategy, if applicable: Not currently indicated    Supplements Recommended at this visit: None     RECOMMENDATIONS FOR OTHER CV RISK FACTORS:    1) BLOOD PRESSURE MANAGEMENT:  - routine clinic-based BP measurements at least once annually   Medications: no meds indicated at this time      2) GLYCEMIC STATUS: Normal, at risk   - monitor annually     ANTITHROMBOTIC THERAPY None    OTHER ISSUES:    # UC, in remission since 2019, only minimal flares-ups - exacerbations will increase risk for CV inflammation - risk-enhancing.  Prior lialda and biologic  - continue f/u with GI     Ok to proceed with colonoscopy -- he is low-risk from a vascular medicine standpoint     Studies Ordered at Todays Visit: None   Blood Work Ordered At Today’s visit: lipid  Follow-Up: 6 months     GISELA Stanford  [facility]   [facility]   [phone]     CC: [facility]

## 2025-02-26 NOTE — Clinical Note
REFERRAL APPROVAL NOTICE         Sent on February 26, 2025                   Ghassan Araujo  [address] [place] [address]                   Dear Mr. Araujo,    After a careful review of the medical information and benefit coverage, [facility] has processed your referral. See below for additional details.    If applicable, you must be actively enrolled with your insurance for coverage of the authorized service. If you have any questions regarding your coverage, please contact your insurance directly.    REFERRAL INFORMATION   Referral #:  [id number]  Referred-To Provider    Referred-By Provider:  Gastroenterology    Tee Gonzales M.D.   [facility]      [address] [facility] [id number] [phone] [address] [facility] [address]  [phone]    Referral Start Date:  02/24/2025  Referral End Date:   02/24/2026             SCHEDULING  If you do not already have an appointment, please call [phone] to make an appointment.     MORE INFORMATION  If you do not already have a [employer] account, sign up at: [email or web address]  You can access your medical information, make appointments, see lab results, billing information, and more.  If you have questions regarding this referral, please contact  the [facility] Referrals department at:             [phone]. Monday - Friday 8:00AM - 5:00PM.     Sincerely,    [facility]

## 2025-03-03 ENCOUNTER — HOSPITAL ENCOUNTER (OUTPATIENT)
Dept: LAB | Facility: MEDICAL CENTER | Age: 35
End: 2025-03-03
Attending: INTERNAL MEDICINE
Payer: COMMERCIAL

## 2025-03-03 DIAGNOSIS — E78.5 DYSLIPIDEMIA: Chronic | ICD-10-CM

## 2025-03-03 LAB
CHOLEST SERPL-MCNC: 152 MG/DL (ref 100–199)
FASTING STATUS PATIENT QL REPORTED: NORMAL
HDLC SERPL-MCNC: 32 MG/DL
LDLC SERPL CALC-MCNC: 89 MG/DL
TRIGL SERPL-MCNC: 155 MG/DL (ref 0–149)

## 2025-03-03 PROCEDURE — 36415 COLL VENOUS BLD VENIPUNCTURE: CPT

## 2025-03-03 PROCEDURE — 80061 LIPID PANEL: CPT

## 2025-03-04 ENCOUNTER — RESULTS FOLLOW-UP (OUTPATIENT)
Dept: MEDICAL GROUP | Facility: PHYSICIAN GROUP | Age: 35
End: 2025-03-04

## 2025-03-24 ENCOUNTER — HOSPITAL ENCOUNTER (OUTPATIENT)
Dept: LAB | Facility: MEDICAL CENTER | Age: 35
End: 2025-03-24
Attending: NURSE PRACTITIONER
Payer: COMMERCIAL

## 2025-03-28 ENCOUNTER — TELEPHONE (OUTPATIENT)
Dept: CARDIOLOGY | Facility: MEDICAL CENTER | Age: 35
End: 2025-03-28
Payer: COMMERCIAL

## 2025-04-01 ENCOUNTER — TELEPHONE (OUTPATIENT)
Dept: CARDIOLOGY | Facility: MEDICAL CENTER | Age: 35
End: 2025-04-01

## 2025-04-01 ENCOUNTER — OFFICE VISIT (OUTPATIENT)
Dept: CARDIOLOGY | Facility: MEDICAL CENTER | Age: 35
End: 2025-04-01
Attending: STUDENT IN AN ORGANIZED HEALTH CARE EDUCATION/TRAINING PROGRAM
Payer: COMMERCIAL

## 2025-04-01 VITALS
HEIGHT: 69 IN | OXYGEN SATURATION: 98 % | HEART RATE: 98 BPM | RESPIRATION RATE: 12 BRPM | WEIGHT: 245 LBS | SYSTOLIC BLOOD PRESSURE: 124 MMHG | DIASTOLIC BLOOD PRESSURE: 74 MMHG | BODY MASS INDEX: 36.29 KG/M2

## 2025-04-01 DIAGNOSIS — Z01.810 PREOPERATIVE CARDIOVASCULAR EXAMINATION: ICD-10-CM

## 2025-04-01 DIAGNOSIS — I47.10 PSVT (PAROXYSMAL SUPRAVENTRICULAR TACHYCARDIA) (HCC): Chronic | ICD-10-CM

## 2025-04-01 PROCEDURE — 99213 OFFICE O/P EST LOW 20 MIN: CPT | Performed by: STUDENT IN AN ORGANIZED HEALTH CARE EDUCATION/TRAINING PROGRAM

## 2025-04-01 PROCEDURE — 93005 ELECTROCARDIOGRAM TRACING: CPT | Mod: TC | Performed by: STUDENT IN AN ORGANIZED HEALTH CARE EDUCATION/TRAINING PROGRAM

## 2025-04-01 PROCEDURE — 99212 OFFICE O/P EST SF 10 MIN: CPT | Performed by: STUDENT IN AN ORGANIZED HEALTH CARE EDUCATION/TRAINING PROGRAM

## 2025-04-01 ASSESSMENT — FIBROSIS 4 INDEX: FIB4 SCORE: 0.46

## 2025-04-01 NOTE — PROGRESS NOTES
Arrhythmia Clinic Note (New EP patient)    DOS: 4/1/2025     Chief complaint/Reason for consult: Preoperative cardiac risk assessment    Referring provider: GISELA Stanford     Interval History:  Mr.Zechariah Salas Araujo is a 74 years old gentleman with medical history of paroxysmal SVT, ulcerative colitis, prediabetes, dyslipidemia came for preoperative cardiac risk assessment for colonoscopy requiring deep sedation.  He underwent successful SVT ablation in 2009 in [facility].  Denies history of antiarrhythmic medication use.  He followed Dr. Merritt ornelas for chest pain complaints.  His following echocardiogram/treadmill stress test were unremarkable.  His 2-day event monitor in 2003 showed normal sinus rhythm without arrhythmia.  He reports cardiac healthy diet compliance with improved cholesterol level.  He is relatively active and walks dog from 30 minutes to 2 hours without cardiac limitations.  His ECG showed sinus rhythm without preexcitation, prolonged QT.        ROS (+ highlighted in red):  General--Negative for fatigue, weight loss or weight gain  Cardiovascular--Negative for palpitations, CP, orthopnea, PND, syncope    History reviewed. No pertinent past medical history.    Past Surgical History:   Procedure Laterality Date    HAND SURGERY Right 2003       Social History     Socioeconomic History    Marital status: Single     Spouse name: Not on file    Number of children: Not on file    Years of education: Not on file    Highest education level: Not on file   Occupational History    Not on file   Tobacco Use    Smoking status: Former     Current packs/day: 0.00     Types: Cigarettes     Quit date: 10/4/2021     Years since quitting: 3.4    Smokeless tobacco: Current     Types: Snuff, Chew    Tobacco comments:     Nicotine pouches   Vaping Use    Vaping status: Former   Substance and Sexual Activity    Alcohol use: Yes     Comment: occ    Drug use: Not Currently     Types: Marijuana      "Comment: Occ    Sexual activity: Yes     Partners: Female   Other Topics Concern    Not on file   Social History Narrative    Not on file     Social Drivers of Health     Financial Resource Strain: Not on file   Food Insecurity: Not on file   Transportation Needs: Not on file   Physical Activity: Not on file   Stress: Not on file   Social Connections: Not on file   Intimate Partner Violence: Not on file   Housing Stability: Not on file       Family History   Problem Relation Age of Onset    Cancer Maternal Grandmother         uterine    Heart Disease Maternal Grandfather     Heart Disease Paternal Grandmother     Cancer Paternal Grandfather         Lymphoma       Allergies   Allergen Reactions    Methotrexate Rash and Unspecified    Penicillins Anaphylaxis       Current Outpatient Medications   Medication Sig Dispense Refill    amphetamine-dextroamphetamine (ADDERALL XR) 20 MG per XR capsule       allopurinol (ZYLOPRIM) 300 MG Tab Take 1 Tablet by mouth every day. 90 Tablet 3    fluticasone furoate-vilanterol (BREO ELLIPTA) 100-25 MCG/ACT AEROSOL POWDER, BREATH ACTIVATED Inhale 1 Puff every day. 2 Each 6    rosuvastatin (CRESTOR) 10 MG Tab Take 1 Tablet by mouth every evening. 100 Tablet 3    mesalamine (LIALDA) 1.2 GM Tablet Delayed Response       albuterol 108 (90 Base) MCG/ACT Aero Soln inhalation aerosol Inhale 2 Puffs every 6 hours as needed for Shortness of Breath. 1 Each 2    PROBIOTIC PRODUCT PO Take  by mouth every day. (Patient not taking: Reported on 4/1/2025)       No current facility-administered medications for this visit.       Physical Exam:  Vitals:    04/01/25 0949   BP: 124/74   BP Location: Left arm   Patient Position: Sitting   BP Cuff Size: Adult   Pulse: 98   Resp: 12   SpO2: 98%   Weight: 111 kg (245 lb)   Height: 1.753 m (5' 9\")     General appearance: NAD, conversant  HEENT: PERRL, neck is supple with FROM  Lungs: Clear to auscultation, normal respiratory effort  CV: RRR, no " murmurs/rubs/gallops, no JVD  Abdomen: Soft, non-tender with normal bowel sounds  Extremities: No peripheral edema, no clubbing or cyanosis  Skin: No rash, lesions, or ulcers  Psych: Alert and oriented to person, place and time    Data:    Lab Results   Component Value Date/Time    CHOLSTRLTOT 152 03/03/2025 09:34 AM    LDL 89 03/03/2025 09:34 AM    HDL 32 (A) 03/03/2025 09:34 AM    TRIGLYCERIDE 155 (H) 03/03/2025 09:34 AM       Lab Results   Component Value Date/Time    SODIUM 139 02/21/2025 10:40 AM    POTASSIUM 4.5 02/21/2025 10:40 AM    CHLORIDE 101 02/21/2025 10:40 AM    CO2 26 02/21/2025 10:40 AM    GLUCOSE 100 (H) 02/21/2025 10:40 AM    BUN 15 02/21/2025 10:40 AM    CREATININE 0.95 02/21/2025 10:40 AM     Lab Results   Component Value Date/Time    ALKPHOSPHAT 89 02/24/2025 02:29 PM    ASTSGOT 32 02/24/2025 02:29 PM    ALTSGPT 71 (H) 02/24/2025 02:29 PM    ALTSGPT 68 (H) 02/24/2025 02:29 PM    TBILIRUBIN 0.4 02/24/2025 02:29 PM       Prior echo/stress reviewed:  Echocardiogram in 6/2023, preserved LVEF, no significant valvular disease  Treadmill stress test in 3/2023: normal stress test      EKG interpreted by me:    SR, narrow QRS, no preexcitation    Impression/Plan:    Preoperative cardiac risk assessment  History of paroxysmal supraventricular tachycardia status post ablation in 2009 in [facility]  Ulcerative colitis,   Prediabetes,   Dyslipidemia    - I discussed with .Ghassan Araujo the etiologies of SVT, and no reported recurrent SVT at this point.  - RCRI 0, 3.9% risk of major cardiac event.  - I will see him as needed    Rosette Grace MD, PhD  Cardiac Electrophysiologist

## 2025-04-01 NOTE — TELEPHONE ENCOUNTER
"Per [facility] Note \"Surgical clearance was printed and filled out by [facility]. Completed clearance was [facility]ed to [facility]  Fax #:[phone]  Confirmation has been received and scanned into patients chart.\" .den        "

## 2025-04-01 NOTE — TELEPHONE ENCOUNTER
FANY    Caller: Ghassan Araujo     Topic/issue: Pt just saw Grace and received cardio clearance and he is requesting it be faxed to [facility] at [phone]    Callback Number: [phone]    Thank You   Lindsey KANG

## 2025-04-02 ENCOUNTER — TELEPHONE (OUTPATIENT)
Dept: CARDIOLOGY | Facility: MEDICAL CENTER | Age: 35
End: 2025-04-02
Payer: COMMERCIAL

## 2025-04-04 NOTE — TELEPHONE ENCOUNTER
GI needs to make decision where the pt gets procedure. We just need to give surgical clearance letter.

## 2025-04-05 LAB — EKG IMPRESSION: NORMAL

## 2025-04-05 PROCEDURE — 93010 ELECTROCARDIOGRAM REPORT: CPT | Performed by: STUDENT IN AN ORGANIZED HEALTH CARE EDUCATION/TRAINING PROGRAM

## 2025-04-09 NOTE — TELEPHONE ENCOUNTER
Called back office to give them clarification per  last note left vm to the nurse line and will be faxing a letter as well with clarifications and patient note. Received fax confirmation and scanned into chart

## 2025-04-10 ENCOUNTER — TELEPHONE (OUTPATIENT)
Dept: CARDIOLOGY | Facility: MEDICAL CENTER | Age: 35
End: 2025-04-10
Payer: COMMERCIAL

## 2025-04-10 NOTE — LETTER
PROCEDURE/SURGERY CLEARANCE FORM      Encounter Date: 4/10/2025    Patient: Ghassan Araujo  YOB: 1990    CARDIOLOGIST: Rosette Grace MD  REFERRING DOCTOR:  No ref. provider found    The  following procedure/surgery: Colonoscopy  with Deep (Propofol) Sedation                                            PROCEDURE/SURGERY CLEARANCE FORM    Date: 4/17/2025   Patient Name: Ghassan Araujo    Dear Surgeon or Proceduralist,      Thank you for your request for cardiac stratification of our mutual patient Ghassan Araujo 1990. We have reviewed their [facility] records; and to the best of our understanding this patient has not had stenting, ablation, watchman, cardiothoracic surgery or hospitalization for cardiovascular reasons in the past 6 months.  Ghassan Araujo has been seen within the past 15 months and is considered to have non-modifiable cardiac risk for this low-risk procedure/surgery. They may proceed from a cardiovascular standpoint and may hold their antiplatelet/anticoagulation as briefly as possible. Please have patient resume this medication when hemodynamically stable to do so.     Aspirin or Prasugrel   - hold 7 days prior to procedure/surgery, resume when hemodynamically stable      Clopidrogrel or Ticagrelor  - hold 7 days for all neurological procedures, hold 5 days prior to all other procedure/surgery,  resume when hemodynamically stable     Warfarin - hold 7 days for all neurological procedures, hold 5 days prior to all other procedure/surgery and coordinate with [facility] ([phone]) INR testing and dose management.      Pradaxa/Xarelto/Eliquis/Savesya - hold 1 day prior to procedure for low bleeding risk procedure, 2 days for high bleeding risk procedure, or consider holding 3 days or longer for patients with reduced kidney function (CrCl <30mL/min) or spinal/cranial surgeries/procedures.      If they have a mechanical  heart valve, please coordinate with [facility] ([phone]) the proper management of their anticoagulant in the periprocedural or perioperative period.      Some patients have higher risk for cardiovascular complications or holding medication. If our patient has had prior complications of holding antiplatelet or anticoagulants in the past and we have seen them after these events, we have addressed these concerns with the patient. They are at an unknown degree of increased risk for recurrent complication.  You may hold anticoagulation/antiplatelets for the procedure or surgery if the benefits of the procedure or surgery outweigh this nonmodifiable risk.      If Ghassan Araujo 1990 has new symptoms of heart failure decompensation, unstable arrythmia, or angina please reach out and we will assess the patient.      If you have other patient-specific concerns, please feel free to reach out to the patient's cardiologist directly at [phone].     Thank you,       [facility]                     Electronically Signed         MD Signature   Rosette Grace MD

## 2025-04-17 NOTE — TELEPHONE ENCOUNTER
Spoke to a represent from [facility] they need a new letter.made new letter and faxed it to their office.

## 2025-05-05 ENCOUNTER — HOSPITAL ENCOUNTER (OUTPATIENT)
Facility: MEDICAL CENTER | Age: 35
End: 2025-05-05
Attending: NURSE PRACTITIONER
Payer: COMMERCIAL

## 2025-05-05 PROCEDURE — 83993 ASSAY FOR CALPROTECTIN FECAL: CPT

## 2025-05-08 LAB — CALPROTECTIN STL-MCNT: 5 UG/G

## 2025-07-01 ENCOUNTER — HOSPITAL ENCOUNTER (OUTPATIENT)
Dept: RADIOLOGY | Facility: MEDICAL CENTER | Age: 35
End: 2025-07-01
Attending: INTERNAL MEDICINE
Payer: COMMERCIAL

## 2025-07-01 DIAGNOSIS — R74.8 LIVER ENZYME ELEVATION: Chronic | ICD-10-CM

## 2025-07-01 PROCEDURE — 76705 ECHO EXAM OF ABDOMEN: CPT

## 2025-07-09 NOTE — Clinical Note
REFERRAL APPROVAL NOTICE         Sent on July 9, 2025                   Ghassan Araujo  [address] [facility] [place] [address]                   Dear Mr. Araujo,    After a careful review of the medical information and benefit coverage, [facility] has processed your referral. See below for additional details.    If applicable, you must be actively enrolled with your insurance for coverage of the authorized service. If you have any questions regarding your coverage, please contact your insurance directly.    REFERRAL INFORMATION   Referral #:  [id number]  Referred-To Provider    Referred-By Provider:  Gastroenterology    Tee Gonzales M.D.   [facility]      [address] [place] [id number] [phone] [address] [facility] [address]  [phone]    Referral Start Date:  07/01/2025  Referral End Date:   07/01/2026             SCHEDULING  If you do not already have an appointment, please call [phone] to make an appointment.     MORE INFORMATION  If you do not already have a [facility] account, sign up at: [email or web address]  You can access your medical information, make appointments, see lab results, billing information, and more.  If you have questions regarding this referral, please contact  the [facility] Referrals department at:             [phone]. Monday - Friday 8:00AM - 5:00PM.     Sincerely,    [facility]

## 2025-08-13 DIAGNOSIS — E78.5 DYSLIPIDEMIA: Primary | ICD-10-CM

## 2025-08-18 ENCOUNTER — APPOINTMENT (OUTPATIENT)
Dept: VASCULAR LAB | Facility: MEDICAL CENTER | Age: 35
End: 2025-08-18
Attending: NURSE PRACTITIONER
Payer: COMMERCIAL

## 2025-08-19 DIAGNOSIS — E78.2 MIXED HYPERLIPIDEMIA: ICD-10-CM

## 2025-08-19 RX ORDER — ROSUVASTATIN CALCIUM 10 MG/1
10 TABLET, COATED ORAL EVERY EVENING
Qty: 100 TABLET | Refills: 3 | Status: SHIPPED | OUTPATIENT
Start: 2025-08-19 | End: 2025-08-26 | Stop reason: SDUPTHER

## 2025-08-26 ENCOUNTER — OFFICE VISIT (OUTPATIENT)
Dept: VASCULAR LAB | Facility: MEDICAL CENTER | Age: 35
End: 2025-08-26
Attending: NURSE PRACTITIONER
Payer: COMMERCIAL

## 2025-08-26 VITALS
DIASTOLIC BLOOD PRESSURE: 83 MMHG | SYSTOLIC BLOOD PRESSURE: 121 MMHG | WEIGHT: 247 LBS | BODY MASS INDEX: 36.58 KG/M2 | HEIGHT: 69 IN | HEART RATE: 109 BPM

## 2025-08-26 DIAGNOSIS — E78.5 DYSLIPIDEMIA: Primary | Chronic | ICD-10-CM

## 2025-08-26 DIAGNOSIS — K51.90 ULCERATIVE COLITIS WITHOUT COMPLICATIONS, UNSPECIFIED LOCATION (HCC): Chronic | ICD-10-CM

## 2025-08-26 DIAGNOSIS — R73.03 PREDIABETES: Chronic | ICD-10-CM

## 2025-08-26 DIAGNOSIS — E78.2 MIXED HYPERLIPIDEMIA: ICD-10-CM

## 2025-08-26 PROCEDURE — 3079F DIAST BP 80-89 MM HG: CPT | Performed by: NURSE PRACTITIONER

## 2025-08-26 PROCEDURE — 3074F SYST BP LT 130 MM HG: CPT | Performed by: NURSE PRACTITIONER

## 2025-08-26 PROCEDURE — 99213 OFFICE O/P EST LOW 20 MIN: CPT

## 2025-08-26 PROCEDURE — 99214 OFFICE O/P EST MOD 30 MIN: CPT | Performed by: NURSE PRACTITIONER

## 2025-08-26 RX ORDER — ROSUVASTATIN CALCIUM 10 MG/1
10 TABLET, COATED ORAL EVERY EVENING
Qty: 100 TABLET | Refills: 3 | Status: SHIPPED | OUTPATIENT
Start: 2025-08-26

## 2025-08-26 ASSESSMENT — ENCOUNTER SYMPTOMS
SPUTUM PRODUCTION: 0
CLAUDICATION: 0
HEMOPTYSIS: 0
SHORTNESS OF BREATH: 0
FEVER: 0
PALPITATIONS: 0
PND: 0
CHILLS: 0
COUGH: 0
ORTHOPNEA: 0
WHEEZING: 0

## 2025-08-26 ASSESSMENT — FIBROSIS 4 INDEX: FIB4 SCORE: 0.46
